# Patient Record
Sex: MALE | Race: ASIAN | NOT HISPANIC OR LATINO | Employment: OTHER | URBAN - METROPOLITAN AREA
[De-identification: names, ages, dates, MRNs, and addresses within clinical notes are randomized per-mention and may not be internally consistent; named-entity substitution may affect disease eponyms.]

---

## 2017-03-06 DIAGNOSIS — I10 ESSENTIAL HYPERTENSION: ICD-10-CM

## 2017-03-07 RX ORDER — LOSARTAN POTASSIUM 100 MG/1
TABLET ORAL
Qty: 90 TAB | Refills: 3 | Status: SHIPPED | OUTPATIENT
Start: 2017-03-07 | End: 2018-03-01 | Stop reason: SDUPTHER

## 2017-03-07 NOTE — TELEPHONE ENCOUNTER
Was the patient seen in the last year in this department? Yes     Does patient have an active prescription for medications requested? Yes     Received Request Via: Pharmacy    Last office visit: 06/22/2016  Last labs: 06/23/2016

## 2017-03-08 ENCOUNTER — HOSPITAL ENCOUNTER (OUTPATIENT)
Dept: LAB | Facility: MEDICAL CENTER | Age: 72
End: 2017-03-08
Attending: INTERNAL MEDICINE
Payer: MEDICARE

## 2017-03-08 DIAGNOSIS — E78.2 MIXED HYPERLIPIDEMIA: ICD-10-CM

## 2017-03-08 DIAGNOSIS — Z85.46 HISTORY OF PROSTATE CANCER: ICD-10-CM

## 2017-03-08 DIAGNOSIS — I10 ESSENTIAL HYPERTENSION: ICD-10-CM

## 2017-03-08 LAB
ALBUMIN SERPL BCP-MCNC: 4.7 G/DL (ref 3.2–4.9)
ALBUMIN/GLOB SERPL: 1.4 G/DL
ALP SERPL-CCNC: 46 U/L (ref 30–99)
ALT SERPL-CCNC: 27 U/L (ref 2–50)
ANION GAP SERPL CALC-SCNC: 6 MMOL/L (ref 0–11.9)
AST SERPL-CCNC: 22 U/L (ref 12–45)
BASOPHILS # BLD AUTO: 0.06 K/UL (ref 0–0.12)
BASOPHILS NFR BLD AUTO: 1.2 % (ref 0–1.8)
BILIRUB SERPL-MCNC: 0.8 MG/DL (ref 0.1–1.5)
BUN SERPL-MCNC: 17 MG/DL (ref 8–22)
CALCIUM SERPL-MCNC: 10.3 MG/DL (ref 8.5–10.5)
CHLORIDE SERPL-SCNC: 103 MMOL/L (ref 96–112)
CHOLEST SERPL-MCNC: 158 MG/DL (ref 100–199)
CO2 SERPL-SCNC: 29 MMOL/L (ref 20–33)
CREAT SERPL-MCNC: 1.04 MG/DL (ref 0.5–1.4)
EOSINOPHIL # BLD: 0.04 K/UL (ref 0–0.51)
EOSINOPHIL NFR BLD AUTO: 0.8 % (ref 0–6.9)
ERYTHROCYTE [DISTWIDTH] IN BLOOD BY AUTOMATED COUNT: 43.1 FL (ref 35.9–50)
GLOBULIN SER CALC-MCNC: 3.4 G/DL (ref 1.9–3.5)
GLUCOSE SERPL-MCNC: 94 MG/DL (ref 65–99)
HCT VFR BLD AUTO: 51.6 % (ref 42–52)
HDLC SERPL-MCNC: 58 MG/DL
HGB BLD-MCNC: 17.4 G/DL (ref 14–18)
IMM GRANULOCYTES # BLD AUTO: 0.01 K/UL (ref 0–0.11)
IMM GRANULOCYTES NFR BLD AUTO: 0.2 % (ref 0–0.9)
LDLC SERPL CALC-MCNC: 81 MG/DL
LYMPHOCYTES # BLD: 1.59 K/UL (ref 1–4.8)
LYMPHOCYTES NFR BLD AUTO: 31.7 % (ref 22–41)
MCH RBC QN AUTO: 33.3 PG (ref 27–33)
MCHC RBC AUTO-ENTMCNC: 33.7 G/DL (ref 33.7–35.3)
MCV RBC AUTO: 98.7 FL (ref 81.4–97.8)
MONOCYTES # BLD: 0.22 K/UL (ref 0–0.85)
MONOCYTES NFR BLD AUTO: 4.4 % (ref 0–13.4)
NEUTROPHILS # BLD: 3.1 K/UL (ref 1.82–7.42)
NEUTROPHILS NFR BLD AUTO: 61.7 % (ref 44–72)
NRBC # BLD AUTO: 0 K/UL
NRBC BLD-RTO: 0 /100 WBC
PLATELET # BLD AUTO: 284 K/UL (ref 164–446)
PMV BLD AUTO: 10.9 FL (ref 9–12.9)
POTASSIUM SERPL-SCNC: 4.2 MMOL/L (ref 3.6–5.5)
PROT SERPL-MCNC: 8.1 G/DL (ref 6–8.2)
PSA SERPL DL<=0.01 NG/ML-MCNC: 0.02 NG/ML (ref 0–4)
RBC # BLD AUTO: 5.23 M/UL (ref 4.7–6.1)
SODIUM SERPL-SCNC: 138 MMOL/L (ref 135–145)
TRIGL SERPL-MCNC: 97 MG/DL (ref 0–149)
WBC # BLD AUTO: 5 K/UL (ref 4.8–10.8)

## 2017-03-08 PROCEDURE — 84153 ASSAY OF PSA TOTAL: CPT | Mod: GA

## 2017-03-08 PROCEDURE — 80061 LIPID PANEL: CPT

## 2017-03-08 PROCEDURE — 80053 COMPREHEN METABOLIC PANEL: CPT

## 2017-03-08 PROCEDURE — 36415 COLL VENOUS BLD VENIPUNCTURE: CPT

## 2017-03-08 PROCEDURE — 85025 COMPLETE CBC W/AUTO DIFF WBC: CPT

## 2017-03-09 NOTE — PROGRESS NOTES
Quick Note:    I will discuss the result in upcoming appointment.     Libra Washington MD    ______

## 2017-03-13 ENCOUNTER — OFFICE VISIT (OUTPATIENT)
Dept: MEDICAL GROUP | Age: 72
End: 2017-03-13
Payer: MEDICARE

## 2017-03-13 VITALS
WEIGHT: 192 LBS | DIASTOLIC BLOOD PRESSURE: 78 MMHG | HEART RATE: 72 BPM | SYSTOLIC BLOOD PRESSURE: 130 MMHG | OXYGEN SATURATION: 97 % | TEMPERATURE: 97.1 F | BODY MASS INDEX: 30.13 KG/M2 | HEIGHT: 67 IN

## 2017-03-13 DIAGNOSIS — I65.21 STENOSIS OF RIGHT CAROTID ARTERY: ICD-10-CM

## 2017-03-13 DIAGNOSIS — E78.2 MIXED HYPERLIPIDEMIA: ICD-10-CM

## 2017-03-13 DIAGNOSIS — I10 ESSENTIAL HYPERTENSION: ICD-10-CM

## 2017-03-13 DIAGNOSIS — Z23 NEED FOR VACCINATION: ICD-10-CM

## 2017-03-13 PROCEDURE — 90715 TDAP VACCINE 7 YRS/> IM: CPT | Performed by: INTERNAL MEDICINE

## 2017-03-13 PROCEDURE — G8598 ASA/ANTIPLAT THER USED: HCPCS | Performed by: INTERNAL MEDICINE

## 2017-03-13 PROCEDURE — 4040F PNEUMOC VAC/ADMIN/RCVD: CPT | Performed by: INTERNAL MEDICINE

## 2017-03-13 PROCEDURE — G8420 CALC BMI NORM PARAMETERS: HCPCS | Performed by: INTERNAL MEDICINE

## 2017-03-13 PROCEDURE — 1101F PT FALLS ASSESS-DOCD LE1/YR: CPT | Performed by: INTERNAL MEDICINE

## 2017-03-13 PROCEDURE — 99214 OFFICE O/P EST MOD 30 MIN: CPT | Mod: 25 | Performed by: INTERNAL MEDICINE

## 2017-03-13 PROCEDURE — G8482 FLU IMMUNIZE ORDER/ADMIN: HCPCS | Performed by: INTERNAL MEDICINE

## 2017-03-13 PROCEDURE — G8432 DEP SCR NOT DOC, RNG: HCPCS | Performed by: INTERNAL MEDICINE

## 2017-03-13 PROCEDURE — 90471 IMMUNIZATION ADMIN: CPT | Performed by: INTERNAL MEDICINE

## 2017-03-13 PROCEDURE — 1036F TOBACCO NON-USER: CPT | Performed by: INTERNAL MEDICINE

## 2017-03-13 PROCEDURE — 3017F COLORECTAL CA SCREEN DOC REV: CPT | Mod: 1P | Performed by: INTERNAL MEDICINE

## 2017-03-13 RX ORDER — ATORVASTATIN CALCIUM 10 MG/1
10 TABLET, FILM COATED ORAL DAILY
Qty: 90 TAB | Refills: 3 | Status: SHIPPED | OUTPATIENT
Start: 2017-03-13 | End: 2017-09-18 | Stop reason: SDUPTHER

## 2017-03-13 ASSESSMENT — PATIENT HEALTH QUESTIONNAIRE - PHQ9: CLINICAL INTERPRETATION OF PHQ2 SCORE: 0

## 2017-03-13 NOTE — MR AVS SNAPSHOT
"        Joshua Ed Jainel   3/13/2017 9:00 AM   Office Visit   MRN: [id number]    Department:  [address] [place] [address]   Dept Phone:  [phone]    Description:  Male : 1945   Provider:  Libra Washington M.D.           Reason for Visit     Hypertension     Results Blood work done on 2017    Hyperlipidemia           Allergies as of 3/13/2017     No Known Allergies      You were diagnosed with     Essential hypertension   [5596470]       Mixed hyperlipidemia   [272.2.ICD-9-CM]       Need for vaccination   [359343]         Vital Signs     Blood Pressure Pulse Temperature Height Weight Body Mass Index    130/78 mmHg 72 36.2 °C (97.1 °F) 1.708 m (5' 7.25\") 87.091 kg (192 lb) 29.85 kg/m2    Oxygen Saturation Smoking Status                97% Never Smoker           Basic Information     Date Of Birth Sex Race Ethnicity Preferred Language    1945 Male  Non- English      Your appointments     Sep 18, 2017  9:00 AM   Established Patient with Libra Washington M.D.   [address] [address]   [phone]           You will be receiving a confirmation call a few days before your appointment from our automated call confirmation system.              Problem List              ICD-10-CM Priority Class Noted - Resolved    H/O prostatectomy Z90.79   9/3/2013 - Present    History of prostate cancer Z85.46   9/3/2013 - Present    Essential hypertension I10 Low  2014 - Present    Mixed hyperlipidemia E78.2 Low  2014 - Present    CAD (coronary artery disease) I25.10 High  2014 - Present    Stenosis of right carotid artery I65.21   2014 - Present    S/P CABG (coronary artery bypass graft) Z95.1 High  12/15/2014 - Present    Aortic stenosis I35.0 Medium  2/10/2015 - Present    Other and combined forms of senile cataract H25.89   2015 - Present    Postoperative atrial fibrillation (CMS-HCC) I97.89, I48.91 Medium  " 6/30/2015 - Present    Nocturnal enuresis N39.44   10/28/2015 - Present    Arthritis of right knee M19.90   9/16/2016 - Present    Bilateral low back pain without sciatica M54.5   9/16/2016 - Present      Health Maintenance        Date Due Completion Dates    IMM DTaP/Tdap/Td Vaccine (1 - Tdap) 5/26/1966 ---    IMM PNEUMOCOCCAL 65+ (ADULT) LOW/MEDIUM RISK SERIES (2 of 2 - PCV13) 12/1/2015 12/1/2014    COLONOSCOPY 9/3/2016 9/3/2006 (Prv Comp)    Override on 9/3/2006: Previously completed            Current Immunizations     13-VALENT PCV PREVNAR  Incomplete    Influenza TIV (IM) 11/20/2014    Influenza Vaccine Adult HD 10/4/2016  9:09 AM, 10/28/2015    Pneumococcal polysaccharide vaccine (PPSV-23) 12/1/2014    SHINGLES VACCINE 11/25/2015  1:26 PM    Tdap Vaccine  Incomplete      Below and/or attached are the medications your provider expects you to take. Review all of your home medications and newly ordered medications with your provider and/or pharmacist. Follow medication instructions as directed by your provider and/or pharmacist. Please keep your medication list with you and share with your provider. Update the information when medications are discontinued, doses are changed, or new medications (including over-the-counter products) are added; and carry medication information at all times in the event of emergency situations     Allergies:  No Known Allergies          Medications  Valid as of: March 13, 2017 - 10:26 AM    Generic Name Brand Name Tablet Size Instructions for use    Aspirin (Tablet Delayed Response) ECOTRIN 81 MG Take 81 mg by mouth every day.        Atorvastatin Calcium (Tab) LIPITOR 10 MG Take 1 Tab by mouth every day.        Calcium (Tab) Calcium 600 MG Take  by mouth.        Clopidogrel Bisulfate (Tab) PLAVIX 75 MG Take 1 Tab by mouth every day.        Losartan Potassium (Tab) COZAAR 100 MG TAKE 1 TABLET BY MOUTH EVERY DAY        Metoprolol Tartrate (Tab) LOPRESSOR 25 MG Take 1 Tab by mouth 2  Times a Day.        Omega-3 Fatty Acids (Cap) fish oil 1000 MG Take 1,000 mg by mouth 3 times a day, with meals.        .                 Medicines prescribed today were sent to:     [facility] #[id number] - [place] - [address]    [address] [place] [address]    Phone: [phone] Fax: [phone]    Open 24 Hours?: No      Medication refill instructions:       If your prescription bottle indicates you have medication refills left, it is not necessary to call your provider’s office. Please contact your pharmacy and they will refill your medication.    If your prescription bottle indicates you do not have any refills left, you may request refills at any time through one of the following ways: The online [employer] system (except Urgent Care), by calling your provider’s office, or by asking your pharmacy to contact your provider’s office with a refill request. Medication refills are processed only during regular business hours and may not be available until the next business day. Your provider may request additional information or to have a follow-up visit with you prior to refilling your medication.   *Please Note: Medication refills are assigned a new Rx number when refilled electronically. Your pharmacy may indicate that no refills were authorized even though a new prescription for the same medication is available at the pharmacy. Please request the medicine by name with the pharmacy before contacting your provider for a refill.        Your To Do List     Future Labs/Procedures Complete By Expires    COMP METABOLIC PANEL  As directed 3/14/2018    LIPID PROFILE  As directed 3/14/2018         Oris4 Access Code: Activation code not generated  Current Oris4 Status: Active

## 2017-03-13 NOTE — Clinical Note
[facility]  Libra Washington M.D.  [address]  [place] [address]  Fax: [phone]   Authorization for Release/Disclosure of   Protected Health Information   Name: JOSHUA BECERRA : 1945 SSN: [id number]   Address: [facility] [address]   [place] [address] Phone:    [phone] (home)    I authorize the entity listed below to release/disclose the PHI below to:   [facility]/Libra Washington M.D. and Libra Washington M.D.   Provider or Entity Name:  [facility]   Address   City, State, [facility]   Phone:      Fax:  [phone]     Reason for request: continuity of care   Information to be released:    [ x ] LAST COLONOSCOPY,  including any PATH REPORT and follow-up  [  ] LAST FIT/COLOGUARD RESULT [  ] LAST DEXA  [  ] LAST MAMMOGRAM  [  ] LAST PAP  [  ] LAST LABS [  ] RETINA EXAM REPORT  [  ] IMMUNIZATION RECORDS  [  ] Release all info      [  ] Check here and initial the line next to each item to release ALL health information INCLUDING  _____ Care and treatment for drug and / or alcohol abuse  _____ HIV testing, infection status, or AIDS  _____ Genetic Testing    DATES OF SERVICE OR TIME PERIOD TO BE DISCLOSED: _____________  I understand and acknowledge that:  * This Authorization may be revoked at any time by you in writing, except if your health information has already been used or disclosed.  * Your health information that will be used or disclosed as a result of you signing this authorization could be re-disclosed by the recipient. If this occurs, your re-disclosed health information may no longer be protected by State or Federal laws.  * You may refuse to sign this Authorization. Your refusal will not affect your ability to obtain treatment.  * This Authorization becomes effective upon signing and will  on (date) __________.      If no date is indicated, this Authorization will  one (1) year from the signature date.    Name: Joshua Becerra    Signature:   Date:      3/13/2017       PLEASE FAX REQUESTED RECORDS BACK TO: [phone]

## 2017-03-13 NOTE — ASSESSMENT & PLAN NOTE
His blood pressure is well controlled with current regimen, metoprolol 25 mg twice a day, losartan 100 mg daily. He tolerates medications without side effect. His recent CMP on 3/8/17 were within normal. He has normal CBC except increase MCV, but no anemia, has normal white blood cell and differential count.

## 2017-03-13 NOTE — ASSESSMENT & PLAN NOTE
Patient is taking Plavix 75 mg daily and Lipitor 10 mg daily. He also takes aspirin 81 mg daily. I discussed with him the risks and benefits of double antiplatelet and blood thinner. He denies side effects from taking Plavix, aspirin and Lipitor currently. He is asymptomatic. He will follow with cardiologist in June 2017. Patient reported that he is going to discuss with his cardiologist to stop baby aspirin and continue Plavix.

## 2017-03-13 NOTE — PROGRESS NOTES
Subjective:   Joshua Becerra is a 71 y.o. male here today for evaluation and management of:      Stenosis of right carotid artery  Patient is taking Plavix 75 mg daily and Lipitor 10 mg daily. He also takes aspirin 81 mg daily. I discussed with him the risks and benefits of double antiplatelet and blood thinner. He denies side effects from taking Plavix, aspirin and Lipitor currently. He is asymptomatic. He will follow with cardiologist in June 2017. Patient reported that he is going to discuss with his cardiologist to stop baby aspirin and continue Plavix.    Essential hypertension  His blood pressure is well controlled with current regimen, metoprolol 25 mg twice a day, losartan 100 mg daily. He tolerates medications without side effect. His recent CMP on 3/8/17 were within normal. He has normal CBC except increase MCV, but no anemia, has normal white blood cell and differential count.    Mixed hyperlipidemia  Patient is taking Lipitor 10 mg every afternoon. His Lipitor was decreased from 20 mg to 10 mg by cardiologist on 6/30/16. His lipid panel is at goal. Liver enzymes on 3/8/17 were within normal.  Results for JOSHUA BECERRA (MRN [id number]) as of 3/13/2017 11:01   Ref. Range 3/8/2017 08:53   Cholesterol,Tot Latest Ref Range: 100-199 mg/dL 158   Triglycerides Latest Ref Range: 0-149 mg/dL 97   HDL Latest Ref Range: >=40 mg/dL 58   LDL Latest Ref Range: <100 mg/dL 81              Current medicines (including changes today)  Current Outpatient Prescriptions   Medication Sig Dispense Refill   • metoprolol (LOPRESSOR) 25 MG Tab Take 1 Tab by mouth 2 Times a Day. 180 Tab 3   • atorvastatin (LIPITOR) 10 MG Tab Take 1 Tab by mouth every day. 90 Tab 3   • losartan (COZAAR) 100 MG Tab TAKE 1 TABLET BY MOUTH EVERY DAY 90 Tab 3   • Calcium 600 MG Tab Take  by mouth.     • aspirin EC (ECOTRIN) 81 MG Tablet Delayed Response Take 81 mg by mouth every day.     • Omega-3 Fatty Acids (FISH OIL) 1000 MG Cap  "capsule Take 1,000 mg by mouth 3 times a day, with meals.     • clopidogrel (PLAVIX) 75 MG Tab Take 1 Tab by mouth every day. 30 Tab 11     No current facility-administered medications for this visit.     He  has a past medical history of Hyperlipidemia; Unspecified urinary incontinence; Cancer (CMS-HCC); Unspecified cataract; High cholesterol; Hypertension; and CAD (coronary artery disease).    ROS   No chest pain, no shortness of breath, no abdominal pain       Objective:     Blood pressure 130/78, pulse 72, temperature 36.2 °C (97.1 °F), height 1.708 m (5' 7.25\"), weight 87.091 kg (192 lb), SpO2 97 %. Body mass index is 29.85 kg/(m^2).   Physical Exam:  General: Alert, oriented and no acute distress.  Eye contact is good, speech goal directed, affect calm  HEENT: conjunctiva non-injected, sclera non-icteric.  Oral mucous membranes pink and moist with no lesions.  Pinna normal.   Lungs: Normal respiratory effort, clear to auscultation bilaterally with good excursion.  CV: regular rate and rhythm. No murmurs.  Abdomen: soft, non distended, nontender, Bowel sound normal.  Ext: no edema, color normal, vascularity normal, temperature normal        Assessment and Plan:   The following treatment plan was discussed     1. Essential hypertension  - Well-controlled. Continue current regimens. Recheck lab 1-2 weeks before next follow up visit.  - metoprolol (LOPRESSOR) 25 MG Tab; Take 1 Tab by mouth 2 Times a Day.  Dispense: 180 Tab; Refill: 3  - COMP METABOLIC PANEL; Future    2. Mixed hyperlipidemia  - Well-controlled. Continue current regimens. Recheck lab 1-2 weeks before next follow up visit.  - Advised to eat low fat, low carbohydrate and high fiber diet as well as do cardio physical exercise regularly.   - COMP METABOLIC PANEL; Future  - LIPID PROFILE; Future  - atorvastatin (LIPITOR) 10 MG Tab; Take 1 Tab by mouth every day.  Dispense: 90 Tab; Refill: 3    3. Stenosis of right carotid artery  - Stable. Asymptomatic. " Continue Plavix 75 mg daily. Reviewed the risks and benefits of double regimen of Plavix and aspirin. Patient will discuss with cardiologist in upcoming follow-up in June 2017.  - Discussed to control blood pressure, cholesterol with diet, exercise and medication.    4. Need for vaccination  - Tdap vaccine was given today after reviewing risks and benefits as well as side effects of vaccine.  - TDAP VACCINE =>8YO IM      Health Maintenance: Patient reported that he already has GI consultant and he needs to call for follow-up for colonoscopy.    Followup: Return in about 6 months (around 9/13/2017), or if symptoms worsen or fail to improve, for hypertension, hyperlipidemia, coronary artery disease, right carotid artery stenosis, lab review.      Please note that this dictation was created using voice recognition software. I have made every reasonable attempt to correct obvious errors, but I expect that there may have unintended errors in text, spelling, punctuation, or grammar that I did not discover.

## 2017-03-13 NOTE — ASSESSMENT & PLAN NOTE
Patient is taking Lipitor 10 mg every afternoon. His Lipitor was decreased from 20 mg to 10 mg by cardiologist on 6/30/16. His lipid panel is at goal. Liver enzymes on 3/8/17 were within normal.  Results for ANNE JESENIA KIRSTEN (MRN [id number]) as of 3/13/2017 11:01   Ref. Range 3/8/2017 08:53   Cholesterol,Tot Latest Ref Range: 100-199 mg/dL 158   Triglycerides Latest Ref Range: 0-149 mg/dL 97   HDL Latest Ref Range: >=40 mg/dL 58   LDL Latest Ref Range: <100 mg/dL 81

## 2017-05-06 ENCOUNTER — PATIENT OUTREACH (OUTPATIENT)
Dept: HEALTH INFORMATION MANAGEMENT | Facility: OTHER | Age: 72
End: 2017-05-06

## 2017-05-06 NOTE — PROGRESS NOTES
Outcome: Left Message    WebIZ Checked & Epic Updated:  yes    [facility] Verified: yes    Attempt # 1

## 2017-05-09 ENCOUNTER — TELEPHONE (OUTPATIENT)
Dept: CARDIOLOGY | Facility: MEDICAL CENTER | Age: 72
End: 2017-05-09

## 2017-05-09 NOTE — TELEPHONE ENCOUNTER
CHERY/Kalpana     Patient has an appt on 6/6 with Dr Garcia and wants to get an order for an Echo. He can be reached at [phone] or [phone].            Pt notified that he had an order in Epic for ECHO. Number given for imaging for pt to call and schedule appointment.

## 2017-05-17 NOTE — PROGRESS NOTES
Outcome: Left Message    WebIZ Checked & Epic Updated:  yes    [facility] Verified: yes    Attempt # 2

## 2017-05-18 ENCOUNTER — HOSPITAL ENCOUNTER (OUTPATIENT)
Dept: CARDIOLOGY | Facility: MEDICAL CENTER | Age: 72
End: 2017-05-18
Attending: INTERNAL MEDICINE
Payer: MEDICARE

## 2017-05-18 DIAGNOSIS — I25.10 CORONARY ARTERY DISEASE INVOLVING NATIVE CORONARY ARTERY OF NATIVE HEART WITHOUT ANGINA PECTORIS: ICD-10-CM

## 2017-05-18 DIAGNOSIS — Z95.1 S/P CABG (CORONARY ARTERY BYPASS GRAFT): ICD-10-CM

## 2017-05-18 PROCEDURE — 93306 TTE W/DOPPLER COMPLETE: CPT

## 2017-05-18 PROCEDURE — 93306 TTE W/DOPPLER COMPLETE: CPT | Mod: 26 | Performed by: INTERNAL MEDICINE

## 2017-05-19 LAB
LV EJECT FRACT  99904: 60
LV EJECT FRACT MOD 2C 99903: 51.93

## 2017-05-22 NOTE — PROGRESS NOTES
Outcome: Left Message    WebIZ Checked & Epic Updated:  yes    [facility] Verified: yes    Attempt # 3

## 2017-05-23 NOTE — PROGRESS NOTES
Outcome: Left Message    WebIZ Checked & Epic Updated:  yes    [facility] Verified: yes    Attempt # 4

## 2017-06-06 ENCOUNTER — OFFICE VISIT (OUTPATIENT)
Dept: CARDIOLOGY | Facility: MEDICAL CENTER | Age: 72
End: 2017-06-06
Payer: MEDICARE

## 2017-06-06 VITALS
HEIGHT: 67 IN | DIASTOLIC BLOOD PRESSURE: 74 MMHG | OXYGEN SATURATION: 94 % | WEIGHT: 194 LBS | SYSTOLIC BLOOD PRESSURE: 130 MMHG | BODY MASS INDEX: 30.45 KG/M2 | HEART RATE: 70 BPM

## 2017-06-06 DIAGNOSIS — I25.10 CORONARY ARTERY DISEASE INVOLVING NATIVE CORONARY ARTERY OF NATIVE HEART WITHOUT ANGINA PECTORIS: ICD-10-CM

## 2017-06-06 DIAGNOSIS — I48.91 POSTOPERATIVE ATRIAL FIBRILLATION (HCC): ICD-10-CM

## 2017-06-06 DIAGNOSIS — E78.2 MIXED HYPERLIPIDEMIA: ICD-10-CM

## 2017-06-06 DIAGNOSIS — I10 ESSENTIAL HYPERTENSION: ICD-10-CM

## 2017-06-06 DIAGNOSIS — I65.21 STENOSIS OF RIGHT CAROTID ARTERY: ICD-10-CM

## 2017-06-06 DIAGNOSIS — I35.0 AORTIC VALVE STENOSIS, UNSPECIFIED ETIOLOGY: ICD-10-CM

## 2017-06-06 DIAGNOSIS — Z95.1 S/P CABG (CORONARY ARTERY BYPASS GRAFT): ICD-10-CM

## 2017-06-06 DIAGNOSIS — I97.89 POSTOPERATIVE ATRIAL FIBRILLATION (HCC): ICD-10-CM

## 2017-06-06 PROCEDURE — G8432 DEP SCR NOT DOC, RNG: HCPCS | Performed by: INTERNAL MEDICINE

## 2017-06-06 PROCEDURE — 99214 OFFICE O/P EST MOD 30 MIN: CPT | Performed by: INTERNAL MEDICINE

## 2017-06-06 PROCEDURE — 4040F PNEUMOC VAC/ADMIN/RCVD: CPT | Performed by: INTERNAL MEDICINE

## 2017-06-06 PROCEDURE — G8598 ASA/ANTIPLAT THER USED: HCPCS | Performed by: INTERNAL MEDICINE

## 2017-06-06 PROCEDURE — 1101F PT FALLS ASSESS-DOCD LE1/YR: CPT | Performed by: INTERNAL MEDICINE

## 2017-06-06 PROCEDURE — G8419 CALC BMI OUT NRM PARAM NOF/U: HCPCS | Performed by: INTERNAL MEDICINE

## 2017-06-06 PROCEDURE — 1036F TOBACCO NON-USER: CPT | Performed by: INTERNAL MEDICINE

## 2017-06-06 ASSESSMENT — ENCOUNTER SYMPTOMS
DIZZINESS: 0
PND: 0
BLURRED VISION: 0
ABDOMINAL PAIN: 0
LOSS OF CONSCIOUSNESS: 0
PALPITATIONS: 0
WEIGHT LOSS: 1
CHILLS: 0
ORTHOPNEA: 0
MYALGIAS: 0
INSOMNIA: 0
SHORTNESS OF BREATH: 0
FEVER: 0

## 2017-06-06 NOTE — Clinical Note
[facility]   [address] [place] [address]  SINDY Murphy [address]  Phone: [phone]  Fax: [phone]              Joshua Becerra  1945    Encounter Date: 6/6/2017    Nahun Garcia M.D.          PROGRESS NOTE:  Subjective:   Joshua Becerra is a 71 y.o. male who presents today for annual follow up of coronary artery disease with prior coronary artery bypass graft surgery.    Since the patient's last visit on 06/30/16, he has been doing well clinically. He denies chest pain, shortness of breath, palpitations, nausea/vomiting or diaphoresis. He keeps active walking on his treadmill. He has reduced his red meat intake.    Past Medical History   Diagnosis Date   • Hyperlipidemia    • Unspecified urinary incontinence    • Cancer (CMS-HCC)      Prostate CA- Prostatectomy done   • Unspecified cataract    • High cholesterol    • Hypertension    • CAD (coronary artery disease)      Past Surgical History   Procedure Laterality Date   • Prostatectomy, radical retro  12/17/2008   • Multiple coronary artery bypass endo vein harvest  12/15/2014     Performed by Gustavo Richardson M.D. at SURGERY [facility]   • Cataract phaco with iol Right 6/4/2015     Procedure: CATARACT PHACO WITH IOL;  Surgeon: Matty Augustin M.D.;  Location: SURGERY SURGICAL [facility];  Service:    • Cataract phaco with iol Left 7/2/2015     Procedure: CATARACT PHACO WITH IOL;  Surgeon: Matty Augustin M.D.;  Location: SURGERY SURGICAL [facility];  Service:      Family History   Problem Relation Age of Onset   • Cancer Sister      colon   • Cancer Brother      prostate   • Heart Disease Mother    • Arthritis Mother    • Hypertension Mother    • Hyperlipidemia Mother    • Stroke Mother    • Heart Disease Father    • Hypertension Father    • Hyperlipidemia Father    • Stroke Father    • Diabetes Neg Hx    • Arthritis Brother    • Hyperlipidemia Brother      History   Smoking status   • Never Smoker     "  Smokeless tobacco   • Never Used     No Known Allergies    Medications reviewed.    Outpatient Encounter Prescriptions as of 6/6/2017   Medication Sig Dispense Refill   • metoprolol (LOPRESSOR) 25 MG Tab Take 1 Tab by mouth 2 Times a Day. 180 Tab 3   • atorvastatin (LIPITOR) 10 MG Tab Take 1 Tab by mouth every day. 90 Tab 3   • losartan (COZAAR) 100 MG Tab TAKE 1 TABLET BY MOUTH EVERY DAY 90 Tab 3   • Calcium 600 MG Tab Take  by mouth.     • aspirin EC (ECOTRIN) 81 MG Tablet Delayed Response Take 81 mg by mouth every day.     • Omega-3 Fatty Acids (FISH OIL) 1000 MG Cap capsule Take 1,000 mg by mouth 3 times a day, with meals.     • clopidogrel (PLAVIX) 75 MG Tab Take 1 Tab by mouth every day. 30 Tab 11     No facility-administered encounter medications on file as of 6/6/2017.     Review of Systems   Constitutional: Positive for weight loss. Negative for fever and chills.   HENT: Negative for congestion.    Eyes: Negative for blurred vision.   Respiratory: Negative for shortness of breath.    Cardiovascular: Negative for chest pain, palpitations, orthopnea, leg swelling and PND.   Gastrointestinal: Negative for abdominal pain.   Genitourinary: Negative for dysuria.   Musculoskeletal: Negative for myalgias and joint pain.   Skin: Negative for rash.   Neurological: Negative for dizziness and loss of consciousness.   Psychiatric/Behavioral: The patient does not have insomnia.         Objective:   /74 mmHg  Pulse 70  Ht 1.702 m (5' 7\")  Wt 87.998 kg (194 lb)  BMI 30.38 kg/m2  SpO2 94%    Physical Exam   Constitutional: He is oriented to person, place, and time. He appears well-developed and well-nourished.   HENT:   Head: Normocephalic and atraumatic.   Eyes: Conjunctivae are normal. Pupils are equal, round, and reactive to light.   Neck: Normal range of motion. Neck supple.   Cardiovascular: Normal rate and regular rhythm.    Murmur heard.  Pulmonary/Chest: Effort normal and breath sounds normal. "   Abdominal: Soft. Bowel sounds are normal.   Musculoskeletal: Normal range of motion. He exhibits no edema.   Neurological: He is alert and oriented to person, place, and time.   Skin: Skin is warm and dry.   Psychiatric: He has a normal mood and affect.     CARDIAC STUDIES/PROCEDURES:    CARDIAC CATHETERIZATION CONCLUSIONS (12/13/14)  1. Two-vessel coronary artery disease with mid left anterior descending   artery, ostial diagonal branch and proximal circumflex artery stenosis.   2. Normal left ventricular systolic function with ejection fraction of 55%.   3. Mildly elevated left ventricular end diastolic pressure.   4. Mild aortic stenosis.    CAROTID ULTRASOUND (12/13/14)  RIGHT:  Very mild smooth plaque of the common carotids & bifurcation with minimal   plaque in the proximal internal carotid artery. The ICA waveforms are   resistive with low peak systolic & end diastolic velocities which may   suggest distal obstruction or occlusion.  LEFT:  Very mild smooth plaque of the common carotids & bifurcation extending into   the internal carotid. Velocities are consistent with < 50% stenosis of the   internal carotid artery.   Bilateral subclavian and vertebral artery waveforms are antegrade and   waveforms are normal in character and velocity.     CT OF HEAD (12/18/14)  1. Occlusion or near occlusion of the intracranial right internal carotid artery with apparent tiny branch extending to the middle cerebral artery. The right middle cerebral artery appears to be supplied primarily by the anterior communicating artery.    CT OF NECK (12/18/14)  1. Diffuse narrowing of the right internal carotid artery beginning approximately 2 cm distal to its origin could be due to diffuse atherosclerotic disease or chronic dissection.  2. Occlusion or near occlusion of the intracranial portion of the right internal carotid artery.  3. No evidence of left internal carotid artery stenosis.    ECHOCARDIOGRAM CONCLUSIONS  (05/18/17)  Compared to the prior echo dated 7/9/15, the aortic valve still appears mildly stenotic.  1. Left ventricular ejection fraction is visually estimated to be 60%.   Grade II diastolic dysfunction.  2. There is mild aortic stenosis.  AV Peak Velocity 1.9 m/s                 AV Peak Gradient 14.6 mmHg               AV Mean Gradient 8 mmHg   3. Estimated right ventricular systolic pressure  is 30 mmHg.    ECHOCARDIOGRAM CONCLUSIONS (07/09/15)  Normal left ventricular systolic function.  Moderate concentric left ventricular hypertrophy.  Normal diastolic function - normal mitral inflow pattern and E/E' is normal.  Mitral annular calcification.  Mild mitral regurgitation.  Mild aortic stenosis.  Mild tricuspid regurgitation.    ECHOCARDIOGRAM CONCLUSIONS (12/12/14)  Technically good study.  Normal left ventricular systolic function.  Left ventricular ejection fraction is 60% to 65%.  Grade I diastolic dysfunction - mitral inflow E/A is <1.0.  Mild mitral regurgitation.  Mild aortic stenosis.  Mild tricuspid regurgitation.    EKG performed on (12/18/14) EKG shows atrial fibrillation.  EKG performed on (12/13/14) EKG shows normal sinus rhythm.    Laboratory results of (03/08/17) were reviewed. Cholesterol profile of 158/97/58/81 noted.  Laboratory results of (06/03/16) Cholesterol profile of 106/102/45/41 noted.  Laboratory results of (12/13/14) Cholesterol profile of 139/98/46/73 noted.    Assessment:     Patient Active Problem List    Diagnosis Date Noted   • S/P CABG (coronary artery bypass graft) 12/15/2014     Priority: High   • CAD (coronary artery disease) 12/14/2014     Priority: High   • Postoperative atrial fibrillation 06/30/2015     Priority: Medium   • Aortic stenosis 02/10/2015     Priority: Medium   • HTN (hypertension) 12/12/2014     Priority: Low   • HLD (hyperlipidemia) 12/01/2014     Priority: Low   • Carotid artery stenosis 12/14/2014     Medical Decision Making:  Today's Assessment / Status /  Plan:     1. Coronary artery disease status post coronary bypass graft x 3 with left internal mammary artery graft to left anterior descending artery, saphenous vein graft to diagonal branch, saphenous vein graft to distal obtuse marginal branch by Dr. Richardson (12/15/14): He is clinically doing well.  2. Post operative atrial fibrillation: Sinus rhythm is maintained without evidence of atrial fibrillation episodes off of amiodarone.  3. Aortic stenosis: He is clinically doing well with mild aortic stenosis. We will repeat an echocardiogram in one year.  3. Hypertension: Blood pressure is well controlled.  4. Hyperlipidemia: He is doing well on statin therapy without myalgia symptoms. He will work on diet modification and exercise. We will repeat labs including fasting lipid profile in one year.  5. Carotid artery stenosis-near occlusion of ICA (managed by Dr. Alamo): Clinically stable on medical therapy and continue chronic Plavix use. He has not followed up with Dr. Alamo. We will repeat carotid ultrasound now and in one year.    We will follow up the patient in one year with carotid ultrasounds, echocardiogram and labs.    CC Libra Rao and Andrea Askew      No Recipients

## 2017-06-06 NOTE — PROGRESS NOTES
Subjective:   Joshua Becerra is a 71 y.o. male who presents today for annual follow up of coronary artery disease with prior coronary artery bypass graft surgery.    Since the patient's last visit on 06/30/16, he has been doing well clinically. He denies chest pain, shortness of breath, palpitations, nausea/vomiting or diaphoresis. He keeps active walking on his treadmill. He has reduced his red meat intake.    Past Medical History   Diagnosis Date   • Hyperlipidemia    • Unspecified urinary incontinence    • Cancer (CMS-HCC)      Prostate CA- Prostatectomy done   • Unspecified cataract    • High cholesterol    • Hypertension    • CAD (coronary artery disease)      Past Surgical History   Procedure Laterality Date   • Prostatectomy, radical retro  12/17/2008   • Multiple coronary artery bypass endo vein harvest  12/15/2014     Performed by Gustavo Richardson M.D. at [facility]   • Cataract phaco with iol Right 6/4/2015     Procedure: CATARACT PHACO WITH IOL;  Surgeon: Matty Augustin M.D.;  Location: SURGERY SURGICAL [facility];  Service:    • Cataract phaco with iol Left 7/2/2015     Procedure: CATARACT PHACO WITH IOL;  Surgeon: Matty Augustin M.D.;  Location: SURGERY SURGICAL [facility];  Service:      Family History   Problem Relation Age of Onset   • Cancer Sister      colon   • Cancer Brother      prostate   • Heart Disease Mother    • Arthritis Mother    • Hypertension Mother    • Hyperlipidemia Mother    • Stroke Mother    • Heart Disease Father    • Hypertension Father    • Hyperlipidemia Father    • Stroke Father    • Diabetes Neg Hx    • Arthritis Brother    • Hyperlipidemia Brother      History   Smoking status   • Never Smoker    Smokeless tobacco   • Never Used     No Known Allergies    Medications reviewed.    Outpatient Encounter Prescriptions as of 6/6/2017   Medication Sig Dispense Refill   • metoprolol (LOPRESSOR) 25 MG Tab Take 1 Tab by mouth 2 Times a Day. 180 Tab 3   • atorvastatin  "(LIPITOR) 10 MG Tab Take 1 Tab by mouth every day. 90 Tab 3   • losartan (COZAAR) 100 MG Tab TAKE 1 TABLET BY MOUTH EVERY DAY 90 Tab 3   • Calcium 600 MG Tab Take  by mouth.     • aspirin EC (ECOTRIN) 81 MG Tablet Delayed Response Take 81 mg by mouth every day.     • Omega-3 Fatty Acids (FISH OIL) 1000 MG Cap capsule Take 1,000 mg by mouth 3 times a day, with meals.     • clopidogrel (PLAVIX) 75 MG Tab Take 1 Tab by mouth every day. 30 Tab 11     No facility-administered encounter medications on file as of 6/6/2017.     Review of Systems   Constitutional: Positive for weight loss. Negative for fever and chills.   HENT: Negative for congestion.    Eyes: Negative for blurred vision.   Respiratory: Negative for shortness of breath.    Cardiovascular: Negative for chest pain, palpitations, orthopnea, leg swelling and PND.   Gastrointestinal: Negative for abdominal pain.   Genitourinary: Negative for dysuria.   Musculoskeletal: Negative for myalgias and joint pain.   Skin: Negative for rash.   Neurological: Negative for dizziness and loss of consciousness.   Psychiatric/Behavioral: The patient does not have insomnia.         Objective:   /74 mmHg  Pulse 70  Ht 1.702 m (5' 7\")  Wt 87.998 kg (194 lb)  BMI 30.38 kg/m2  SpO2 94%    Physical Exam   Constitutional: He is oriented to person, place, and time. He appears well-developed and well-nourished.   HENT:   Head: Normocephalic and atraumatic.   Eyes: Conjunctivae are normal. Pupils are equal, round, and reactive to light.   Neck: Normal range of motion. Neck supple.   Cardiovascular: Normal rate and regular rhythm.    Murmur heard.  Pulmonary/Chest: Effort normal and breath sounds normal.   Abdominal: Soft. Bowel sounds are normal.   Musculoskeletal: Normal range of motion. He exhibits no edema.   Neurological: He is alert and oriented to person, place, and time.   Skin: Skin is warm and dry.   Psychiatric: He has a normal mood and affect.     CARDIAC " STUDIES/PROCEDURES:    CARDIAC CATHETERIZATION CONCLUSIONS (12/13/14)  1. Two-vessel coronary artery disease with mid left anterior descending   artery, ostial diagonal branch and proximal circumflex artery stenosis.   2. Normal left ventricular systolic function with ejection fraction of 55%.   3. Mildly elevated left ventricular end diastolic pressure.   4. Mild aortic stenosis.    CAROTID ULTRASOUND (12/13/14)  RIGHT:  Very mild smooth plaque of the common carotids & bifurcation with minimal   plaque in the proximal internal carotid artery. The ICA waveforms are   resistive with low peak systolic & end diastolic velocities which may   suggest distal obstruction or occlusion.  LEFT:  Very mild smooth plaque of the common carotids & bifurcation extending into   the internal carotid. Velocities are consistent with < 50% stenosis of the   internal carotid artery.   Bilateral subclavian and vertebral artery waveforms are antegrade and   waveforms are normal in character and velocity.     CT OF HEAD (12/18/14)  1. Occlusion or near occlusion of the intracranial right internal carotid artery with apparent tiny branch extending to the middle cerebral artery. The right middle cerebral artery appears to be supplied primarily by the anterior communicating artery.    CT OF NECK (12/18/14)  1. Diffuse narrowing of the right internal carotid artery beginning approximately 2 cm distal to its origin could be due to diffuse atherosclerotic disease or chronic dissection.  2. Occlusion or near occlusion of the intracranial portion of the right internal carotid artery.  3. No evidence of left internal carotid artery stenosis.    ECHOCARDIOGRAM CONCLUSIONS (05/18/17)  Compared to the prior echo dated 7/9/15, the aortic valve still appears mildly stenotic.  1. Left ventricular ejection fraction is visually estimated to be 60%.   Grade II diastolic dysfunction.  2. There is mild aortic stenosis.  AV Peak Velocity 1.9 m/s                  AV Peak Gradient 14.6 mmHg               AV Mean Gradient 8 mmHg   3. Estimated right ventricular systolic pressure  is 30 mmHg.    ECHOCARDIOGRAM CONCLUSIONS (07/09/15)  Normal left ventricular systolic function.  Moderate concentric left ventricular hypertrophy.  Normal diastolic function - normal mitral inflow pattern and E/E' is normal.  Mitral annular calcification.  Mild mitral regurgitation.  Mild aortic stenosis.  Mild tricuspid regurgitation.    ECHOCARDIOGRAM CONCLUSIONS (12/12/14)  Technically good study.  Normal left ventricular systolic function.  Left ventricular ejection fraction is 60% to 65%.  Grade I diastolic dysfunction - mitral inflow E/A is <1.0.  Mild mitral regurgitation.  Mild aortic stenosis.  Mild tricuspid regurgitation.    EKG performed on (12/18/14) EKG shows atrial fibrillation.  EKG performed on (12/13/14) EKG shows normal sinus rhythm.    Laboratory results of (03/08/17) were reviewed. Cholesterol profile of 158/97/58/81 noted.  Laboratory results of (06/03/16) Cholesterol profile of 106/102/45/41 noted.  Laboratory results of (12/13/14) Cholesterol profile of 139/98/46/73 noted.    Assessment:     Patient Active Problem List    Diagnosis Date Noted   • S/P CABG (coronary artery bypass graft) 12/15/2014     Priority: High   • CAD (coronary artery disease) 12/14/2014     Priority: High   • Postoperative atrial fibrillation 06/30/2015     Priority: Medium   • Aortic stenosis 02/10/2015     Priority: Medium   • HTN (hypertension) 12/12/2014     Priority: Low   • HLD (hyperlipidemia) 12/01/2014     Priority: Low   • Carotid artery stenosis 12/14/2014     Medical Decision Making:  Today's Assessment / Status / Plan:     1. Coronary artery disease status post coronary bypass graft x 3 with left internal mammary artery graft to left anterior descending artery, saphenous vein graft to diagonal branch, saphenous vein graft to distal obtuse marginal branch by Dr. Richardson (12/15/14): He is  clinically doing well.  2. Post operative atrial fibrillation: Sinus rhythm is maintained without evidence of atrial fibrillation episodes off of amiodarone.  3. Aortic stenosis: He is clinically doing well with mild aortic stenosis. We will repeat an echocardiogram in one year.  3. Hypertension: Blood pressure is well controlled.  4. Hyperlipidemia: He is doing well on statin therapy without myalgia symptoms. He will work on diet modification and exercise. We will repeat labs including fasting lipid profile in one year.  5. Carotid artery stenosis-near occlusion of ICA (managed by Dr. Alamo): Clinically stable on medical therapy and continue chronic Plavix use. He has not followed up with Dr. Alamo. We will repeat carotid ultrasound now and in one year.    We will follow up the patient in one year with carotid ultrasounds, echocardiogram and labs.    CC Libra Rao and Andrea Askew

## 2017-06-06 NOTE — MR AVS SNAPSHOT
"Joshua Becerra   2017 4:00 PM   Office Visit   MRN: [id number]    Department:  [facility]   Dept Phone:  [phone]    Description:  Male : 1945   Provider:  Nahun Garcia M.D.           Reason for Visit     Follow-Up           Allergies as of 2017     No Known Allergies      You were diagnosed with     Coronary artery disease involving native coronary artery of native heart without angina pectoris   [7575602]       S/P CABG (coronary artery bypass graft)   [632975]       Postoperative atrial fibrillation (CMS-[facility])   [798264]       Aortic valve stenosis, unspecified etiology   [0878830]       Essential hypertension   [8062848]       Stenosis of right carotid artery   [034390]       Mixed hyperlipidemia   [272.2.ICD-9-CM]         Vital Signs     Blood Pressure Pulse Height Weight Body Mass Index Oxygen Saturation    130/74 mmHg 70 1.702 m (5' 7\") 87.998 kg (194 lb) 30.38 kg/m2 94%    Smoking Status                   Never Smoker            Basic Information     Date Of Birth Sex Race Ethnicity Preferred Language    1945 Male  Non- English      Your appointments     2017  8:15 AM   CAROTID DUPLEX with VASCULAR LAB [facility] EXAM 6   NON-INVASIVE LAB [facility] ([facility])    [address] [place] [id number] [phone]            Sep 18, 2017  9:00 AM   Established Patient with Libra Washington M.D.   [facility] [address] [place] [id number] [phone]           You will be receiving a confirmation call a few days before your appointment from our automated call confirmation system.            May 16, 2018  9:15 AM   ECHO with ECHO [facility] EXAM 12   ECHOCARDIOLOGY [facility] ([facility])    [address] [address] [phone]            May 16, 2018 10:15 AM   CAROTID DUPLEX with VASCULAR LAB [facility] EXAM 6   NON-INVASIVE LAB [facility] ([facility])    [address] [address]   "   [phone]              Problem List              ICD-10-CM Priority Class Noted - Resolved    H/O prostatectomy Z90.79   9/3/2013 - Present    History of prostate cancer Z85.46   9/3/2013 - Present    Essential hypertension I10 Low  12/1/2014 - Present    Mixed hyperlipidemia E78.2 Low  12/12/2014 - Present    CAD (coronary artery disease) I25.10 High  12/14/2014 - Present    Stenosis of right carotid artery I65.21   12/14/2014 - Present    S/P CABG (coronary artery bypass graft) Z95.1 High  12/15/2014 - Present    Aortic stenosis I35.0 Medium  2/10/2015 - Present    Other and combined forms of senile cataract H25.89   6/4/2015 - Present    Postoperative atrial fibrillation (CMS-HCC) I97.89, I48.91 Medium  6/30/2015 - Present    Nocturnal enuresis N39.44   10/28/2015 - Present    Arthritis of right knee M17.11   9/16/2016 - Present    Bilateral low back pain without sciatica M54.5   9/16/2016 - Present      Health Maintenance        Date Due Completion Dates    IMM PNEUMOCOCCAL 65+ (ADULT) LOW/MEDIUM RISK SERIES (2 of 2 - PCV13) 12/1/2015 12/1/2014    COLONOSCOPY 9/3/2016 9/3/2006 (Prv Comp)    Override on 9/3/2006: Previously completed    IMM DTaP/Tdap/Td Vaccine (2 - Td) 3/13/2027 3/13/2017            Current Immunizations     Influenza TIV (IM) 11/20/2014    Influenza Vaccine Adult HD 10/4/2016  9:09 AM, 10/28/2015    Pneumococcal polysaccharide vaccine (PPSV-23) 12/1/2014    SHINGLES VACCINE 11/25/2015  1:26 PM    Tdap Vaccine 3/13/2017 11:11 AM      Below and/or attached are the medications your provider expects you to take. Review all of your home medications and newly ordered medications with your provider and/or pharmacist. Follow medication instructions as directed by your provider and/or pharmacist. Please keep your medication list with you and share with your provider. Update the information when medications are discontinued, doses are changed, or new medications (including over-the-counter products)  are added; and carry medication information at all times in the event of emergency situations     Allergies:  No Known Allergies          Medications  Valid as of: June 06, 2017 -  4:08 PM    Generic Name Brand Name Tablet Size Instructions for use    Aspirin (Tablet Delayed Response) ECOTRIN 81 MG Take 81 mg by mouth every day.        Atorvastatin Calcium (Tab) LIPITOR 10 MG Take 1 Tab by mouth every day.        Calcium (Tab) Calcium 600 MG Take  by mouth.        Clopidogrel Bisulfate (Tab) PLAVIX 75 MG Take 1 Tab by mouth every day.        Losartan Potassium (Tab) COZAAR 100 MG TAKE 1 TABLET BY MOUTH EVERY DAY        Metoprolol Tartrate (Tab) LOPRESSOR 25 MG Take 1 Tab by mouth 2 Times a Day.        Omega-3 Fatty Acids (Cap) fish oil 1000 MG Take 1,000 mg by mouth 3 times a day, with meals.        .                 Medicines prescribed today were sent to:     [id number] [place] [facility] [address] [facility] [place]    [address] [facility] [place] [address]    Phone: [phone] Fax: [phone]    Open 24 Hours?: No      Medication refill instructions:       If your prescription bottle indicates you have medication refills left, it is not necessary to call your provider’s office. Please contact your pharmacy and they will refill your medication.    If your prescription bottle indicates you do not have any refills left, you may request refills at any time through one of the following ways: The online [employer] system (except Urgent Care), by calling your provider’s office, or by asking your pharmacy to contact your provider’s office with a refill request. Medication refills are processed only during regular business hours and may not be available until the next business day. Your provider may request additional information or to have a follow-up visit with you prior to refilling your medication.   *Please Note: Medication refills are assigned a new Rx number when refilled electronically. Your pharmacy may indicate that no  refills were authorized even though a new prescription for the same medication is available at the pharmacy. Please request the medicine by name with the pharmacy before contacting your provider for a refill.        Your To Do List     Future Labs/Procedures Complete By Expires    CAROTID DUPLEX  As directed 12/7/2018    CAROTID DUPLEX  As directed 12/7/2017    COMP METABOLIC PANEL  As directed 12/5/2017    ECHOCARDIOGRAM COMP W/O CONT  As directed 6/7/2018         MyChart Access Code: Activation code not generated  Current MyChart Status: Active

## 2017-06-12 ENCOUNTER — HOSPITAL ENCOUNTER (OUTPATIENT)
Dept: RADIOLOGY | Facility: MEDICAL CENTER | Age: 72
End: 2017-06-12
Attending: INTERNAL MEDICINE
Payer: MEDICARE

## 2017-06-12 ENCOUNTER — TELEPHONE (OUTPATIENT)
Dept: CARDIOLOGY | Facility: MEDICAL CENTER | Age: 72
End: 2017-06-12

## 2017-06-12 DIAGNOSIS — I65.21 STENOSIS OF RIGHT CAROTID ARTERY: ICD-10-CM

## 2017-06-12 PROCEDURE — 93880 EXTRACRANIAL BILAT STUDY: CPT

## 2017-06-12 PROCEDURE — 93880 EXTRACRANIAL BILAT STUDY: CPT | Mod: 26 | Performed by: INTERNAL MEDICINE

## 2017-06-12 NOTE — TELEPHONE ENCOUNTER
1215 PT notified.  Frances FRANK RN     PT called with message. No answer left  for call back.   Frances FRANK RN     ----- Message from Nahun Garcia M.D. sent at 6/12/2017 11:06 AM PDT -----  Please call the patient with abnormal carotid ultrasound showing occlusion of his right ICA. Please have him follow up with his vascular surgeon, Andrea Askew.    Thanks.  DANETTE

## 2017-06-21 ENCOUNTER — TELEPHONE (OUTPATIENT)
Dept: CARDIOLOGY | Facility: MEDICAL CENTER | Age: 72
End: 2017-06-21

## 2017-06-21 DIAGNOSIS — I65.21 STENOSIS OF RIGHT CAROTID ARTERY: ICD-10-CM

## 2017-06-21 DIAGNOSIS — I65.29 STENOSIS OF CAROTID ARTERY, UNSPECIFIED LATERALITY: ICD-10-CM

## 2017-06-21 NOTE — TELEPHONE ENCOUNTER
Please see Simplee messages with pt regarding his referral to Dr. Alamo for carotid artery stenosis. Part of communication is copied  below. Dr. Alamo's office was contacted and it was determined that pt had not been seen since 2014 and needed another referral. Referral placed per JI to vascular surgery, Dr. Alamo.       RE: Test Result Question  Message [id number]     From   Farnaz Pena R.N.    To   Joshua Becerra    Sent and Delivered   6/21/2017 10:22 AM        Last Read in [employer]   Not Read        Hi Ed. I just talked to Dr. Alamo's office and found out that you haven't been seen by Dr. Alamo since 2014 so they needed a new referral. I had thought Dr. Garcia placed a referral but he didn't as he thought you were an established patient with Dr. Alamo. I apologize for the misunderstanding. I just placed the referral to them. Our referral specialist will be sending your records with the referral. As you mentioned, if Dr. Alamo cannot treat your carotid stenosis they he will refer you to another member of the group.   Ray JEAN          Previous Messages       ----- Message -----      From: JOSHUA BECERRA      Sent: 6/21/2017  9:25 AM PDT        To: Farnaz Pena R.N.   Subject: RE: Test Result Question     Good Morning Ray just called the office of Dr. Andrea Alamo (spoke to Michelle) and was asked to request your office to fax her a copy of the latest carotid duplex test done June 12, 2017. Her fax number is [phone]. She said they have not received any referral or copy of my medical records.   Was also informed that I might see another physician or member of their group since Dr. Alamo does not handle carotid artery surgery anymore.   Thank you very much again and have a good day.   Clint Becerra

## 2017-06-21 NOTE — TELEPHONE ENCOUNTER
Message        Patient has been referred to [facility].       If patient is not contacted in a timely manner, please contact [phone]              Thank You      Noted. Pt requesting the 2014 carotid results faxed to office. Records printed and faxed to [phone]

## 2017-07-03 DIAGNOSIS — I65.23 BILATERAL CAROTID ARTERY STENOSIS: ICD-10-CM

## 2017-07-05 RX ORDER — CLOPIDOGREL BISULFATE 75 MG/1
TABLET ORAL
Qty: 30 TAB | Refills: 11 | Status: SHIPPED | OUTPATIENT
Start: 2017-07-05 | End: 2018-07-09 | Stop reason: SDUPTHER

## 2017-09-11 ENCOUNTER — HOSPITAL ENCOUNTER (OUTPATIENT)
Dept: LAB | Facility: MEDICAL CENTER | Age: 72
End: 2017-09-11
Attending: INTERNAL MEDICINE
Payer: MEDICARE

## 2017-09-11 DIAGNOSIS — I10 ESSENTIAL HYPERTENSION: ICD-10-CM

## 2017-09-11 DIAGNOSIS — E78.2 MIXED HYPERLIPIDEMIA: ICD-10-CM

## 2017-09-11 LAB
ALBUMIN SERPL BCP-MCNC: 4.5 G/DL (ref 3.2–4.9)
ALBUMIN/GLOB SERPL: 1.6 G/DL
ALP SERPL-CCNC: 43 U/L (ref 30–99)
ALT SERPL-CCNC: 19 U/L (ref 2–50)
ANION GAP SERPL CALC-SCNC: 8 MMOL/L (ref 0–11.9)
AST SERPL-CCNC: 20 U/L (ref 12–45)
BILIRUB SERPL-MCNC: 0.8 MG/DL (ref 0.1–1.5)
BUN SERPL-MCNC: 14 MG/DL (ref 8–22)
CALCIUM SERPL-MCNC: 10 MG/DL (ref 8.5–10.5)
CHLORIDE SERPL-SCNC: 104 MMOL/L (ref 96–112)
CHOLEST SERPL-MCNC: 119 MG/DL (ref 100–199)
CO2 SERPL-SCNC: 28 MMOL/L (ref 20–33)
CREAT SERPL-MCNC: 0.92 MG/DL (ref 0.5–1.4)
GFR SERPL CREATININE-BSD FRML MDRD: >60 ML/MIN/1.73 M 2
GLOBULIN SER CALC-MCNC: 2.9 G/DL (ref 1.9–3.5)
GLUCOSE SERPL-MCNC: 84 MG/DL (ref 65–99)
HDLC SERPL-MCNC: 50 MG/DL
LDLC SERPL CALC-MCNC: 52 MG/DL
POTASSIUM SERPL-SCNC: 4.2 MMOL/L (ref 3.6–5.5)
PROT SERPL-MCNC: 7.4 G/DL (ref 6–8.2)
SODIUM SERPL-SCNC: 140 MMOL/L (ref 135–145)
TRIGL SERPL-MCNC: 86 MG/DL (ref 0–149)

## 2017-09-11 PROCEDURE — 80053 COMPREHEN METABOLIC PANEL: CPT

## 2017-09-11 PROCEDURE — 36415 COLL VENOUS BLD VENIPUNCTURE: CPT

## 2017-09-11 PROCEDURE — 80061 LIPID PANEL: CPT

## 2017-09-15 ENCOUNTER — TELEPHONE (OUTPATIENT)
Dept: MEDICAL GROUP | Age: 72
End: 2017-09-15

## 2017-09-15 NOTE — TELEPHONE ENCOUNTER
ESTABLISHED PATIENT PRE-VISIT PLANNING     Note: Patient will not be contacted if there is no indication to call.     1.  Reviewed notes from the last few office visits within the medical group: Yes    2.  If any orders were placed at last visit or intended to be done for this visit (i.e. 6 mos follow-up), do we have Results/Consult Notes?        •  Labs - Labs ordered, completed and results are in chart.       •  Imaging - Imaging ordered, completed and results are in chart.       •  Referrals - No referrals were ordered at last office visit.    3. Is this appointment scheduled as a Hospital Follow-Up? No    4.  Immunizations were updated in Epic using WebIZ?: Epic matches WebIZ       •  Web Iz Recommendations: FLU, PREVNAR (PCV13)  and TD    5.  Patient is due for the following Health Maintenance Topics:   Health Maintenance Due   Topic Date Due   • Annual Wellness Visit  1945   • IMM PNEUMOCOCCAL 65+ (ADULT) LOW/MEDIUM RISK SERIES (2 of 2 - PCV13) 12/01/2015   • COLONOSCOPY  09/03/2016   • IMM INFLUENZA (1) 09/01/2017           6.  Patient was NOT informed to arrive 15 min prior to their scheduled appointment and bring in their medication bottles.

## 2017-09-17 NOTE — ASSESSMENT & PLAN NOTE
Stable. Currently taking metoprolol 25 mg twice a day and losartan 100 mg daily as directed.   He is taking baby aspirin daily.   He is monitoring BP at home.   Denies symptoms low BP: light-headed, tunnel-vision, unusual fatigue.   Denies symptoms high BP:pounding headache, visual changes, palpitations, flushed face.   Denies medicine side effects: unusual fatigue, slow heartbeat, foot/leg swelling, cough.  Recent CMP on 9/11/17 was within normal.

## 2017-09-17 NOTE — ASSESSMENT & PLAN NOTE
Patient is treated with double antiplatelet treatment with Aspirin 81 mg and Plavix 75 mg daily managed by cardiologist, Dr. Garcia and Dr. Alamo, vascular surgeon. We discussed again the risks and benefits of dual antiplatelet treatment today. Patient has carotid ultrasound on 6/12/17 showed right carotid artery occlusion. He was referred to vascular surgeon already by Dr. Garcia, his cardiologist for right carotid artery stenosis treatment.   Patient stated that he was seen by vascular surgeon, Dr. Bernal who did not recommend to do any surgery. Vascular surgeon recommended him to take Plavix and aspirin all the rest of his life. He follows with cardiologist, Dr. Garcia for dual platelet treatment.

## 2017-09-17 NOTE — ASSESSMENT & PLAN NOTE
Stable. Currently taking Lipitor 10 mg every evening and Omega 3 daily as directed.  Denies side effects--no myalgias or abdominal pain.  Reports diet is   He is exercising regularly.  He is taking ASA and Plavix daily for coronary artery disease and carotid artery stenosis as instructed by cardiologist and vascular surgeron.  He denies dizziness, claudication, or chest pain.    Results for JESENIA RODRÍGUEZ (MRN [id number]) as of 9/17/2017 13:46   Ref. Range 3/8/2017 08:53 9/11/2017 10:02   Cholesterol,Tot Latest Ref Range: 100 - 199 mg/dL 158 119   Triglycerides Latest Ref Range: 0 - 149 mg/dL 97 86   HDL Latest Ref Range: >=40 mg/dL 58 50   LDL Latest Ref Range: <100 mg/dL 81 52

## 2017-09-18 ENCOUNTER — OFFICE VISIT (OUTPATIENT)
Dept: MEDICAL GROUP | Age: 72
End: 2017-09-18
Payer: MEDICARE

## 2017-09-18 VITALS
SYSTOLIC BLOOD PRESSURE: 130 MMHG | HEIGHT: 67 IN | TEMPERATURE: 97.9 F | HEART RATE: 87 BPM | WEIGHT: 195 LBS | OXYGEN SATURATION: 93 % | BODY MASS INDEX: 30.61 KG/M2 | DIASTOLIC BLOOD PRESSURE: 74 MMHG

## 2017-09-18 DIAGNOSIS — E66.9 OBESITY (BMI 30.0-34.9): ICD-10-CM

## 2017-09-18 DIAGNOSIS — I10 ESSENTIAL HYPERTENSION: ICD-10-CM

## 2017-09-18 DIAGNOSIS — E78.2 MIXED HYPERLIPIDEMIA: ICD-10-CM

## 2017-09-18 DIAGNOSIS — Z23 NEED FOR VACCINATION: ICD-10-CM

## 2017-09-18 DIAGNOSIS — I65.21 STENOSIS OF RIGHT CAROTID ARTERY: ICD-10-CM

## 2017-09-18 PROCEDURE — G0009 ADMIN PNEUMOCOCCAL VACCINE: HCPCS | Performed by: INTERNAL MEDICINE

## 2017-09-18 PROCEDURE — 90662 IIV NO PRSV INCREASED AG IM: CPT | Performed by: INTERNAL MEDICINE

## 2017-09-18 PROCEDURE — 90670 PCV13 VACCINE IM: CPT | Performed by: INTERNAL MEDICINE

## 2017-09-18 PROCEDURE — 99214 OFFICE O/P EST MOD 30 MIN: CPT | Mod: 25 | Performed by: INTERNAL MEDICINE

## 2017-09-18 PROCEDURE — G0008 ADMIN INFLUENZA VIRUS VAC: HCPCS | Performed by: INTERNAL MEDICINE

## 2017-09-18 RX ORDER — ATORVASTATIN CALCIUM 10 MG/1
10 TABLET, FILM COATED ORAL DAILY
Qty: 90 TAB | Refills: 3 | Status: SHIPPED | OUTPATIENT
Start: 2017-09-18 | End: 2018-11-24 | Stop reason: SDUPTHER

## 2017-09-18 ASSESSMENT — PAIN SCALES - GENERAL: PAINLEVEL: NO PAIN

## 2017-09-18 NOTE — PROGRESS NOTES
Subjective:   Joshua Becerra is a 72 y.o. male here today for evaluation and management of:      Mixed hyperlipidemia  Stable. Currently taking Lipitor 10 mg every evening and Omega 3 daily as directed.  Denies side effects--no myalgias or abdominal pain.  Reports diet is   He is exercising regularly.  He is taking ASA and Plavix daily for coronary artery disease and carotid artery stenosis as instructed by cardiologist and vascular surgeron.  He denies dizziness, claudication, or chest pain.    Results for JOSHUA BECERRA (MRN [id number]) as of 9/17/2017 13:46   Ref. Range 3/8/2017 08:53 9/11/2017 10:02   Cholesterol,Tot Latest Ref Range: 100 - 199 mg/dL 158 119   Triglycerides Latest Ref Range: 0 - 149 mg/dL 97 86   HDL Latest Ref Range: >=40 mg/dL 58 50   LDL Latest Ref Range: <100 mg/dL 81 52       Essential hypertension  Stable. Currently taking metoprolol 25 mg twice a day and losartan 100 mg daily as directed.   He is taking baby aspirin daily.   He is monitoring BP at home.   Denies symptoms low BP: light-headed, tunnel-vision, unusual fatigue.   Denies symptoms high BP:pounding headache, visual changes, palpitations, flushed face.   Denies medicine side effects: unusual fatigue, slow heartbeat, foot/leg swelling, cough.  Recent CMP on 9/11/17 was within normal.      Stenosis of right carotid artery  Patient is treated with double antiplatelet treatment with Aspirin 81 mg and Plavix 75 mg daily managed by cardiologist, Dr. Garcia and Dr. Alamo, vascular surgeon. We discussed again the risks and benefits of dual antiplatelet treatment today. Patient has carotid ultrasound on 6/12/17 showed right carotid artery occlusion. He was referred to vascular surgeon already by Dr. Garcia, his cardiologist for right carotid artery stenosis treatment.   Patient stated that he was seen by vascular surgeon, Dr. Bernal who did not recommend to do any surgery. Vascular surgeon recommended him to take  "Plavix and aspirin all the rest of his life. He follows with cardiologist, Dr. Garcia for dual platelet treatment.         Current medicines (including changes today)  Current Outpatient Prescriptions   Medication Sig Dispense Refill   • atorvastatin (LIPITOR) 10 MG Tab Take 1 Tab by mouth every day. 90 Tab 3   • clopidogrel (PLAVIX) 75 MG Tab TAKE 1 TAB BY MOUTH EVERY DAY. 30 Tab 11   • metoprolol (LOPRESSOR) 25 MG Tab Take 1 Tab by mouth 2 Times a Day. 180 Tab 3   • losartan (COZAAR) 100 MG Tab TAKE 1 TABLET BY MOUTH EVERY DAY 90 Tab 3   • Calcium 600 MG Tab Take  by mouth.     • aspirin EC (ECOTRIN) 81 MG Tablet Delayed Response Take 81 mg by mouth every day.     • Omega-3 Fatty Acids (FISH OIL) 1000 MG Cap capsule Take 1,000 mg by mouth 3 times a day, with meals.       No current facility-administered medications for this visit.      He  has a past medical history of CAD (coronary artery disease); Cancer (CMS-[facility]); High cholesterol; Hyperlipidemia; Hypertension; Unspecified cataract; and Unspecified urinary incontinence.    ROS   No chest pain, no shortness of breath, no abdominal pain       Objective:     Blood pressure 130/74, pulse 87, temperature 36.6 °C (97.9 °F), height 1.702 m (5' 7\"), weight 88.5 kg (195 lb), SpO2 93 %. Body mass index is 30.54 kg/m².   Physical Exam:  General: Alert, oriented and no acute distress.  Eye contact is good, speech goal directed, affect calm  HEENT: conjunctiva non-injected, sclera non-icteric.  Oral mucous membranes pink and moist with no lesions.  Pinna normal.   Lungs: Normal respiratory effort, clear to auscultation bilaterally with good excursion.  CV: regular rate and rhythm. No murmurs.  Abdomen: soft, non distended, nontender, Bowel sound normal.  Ext: no edema, color normal, vascularity normal, temperature normal        Assessment and Plan:   The following treatment plan was discussed     1. Mixed hyperlipidemia  - Well-controlled. Continue current regimens. Recheck " lab 1-2 weeks before next follow up visit. Refilled Lipitor today.  - Advised to eat low fat, low carbohydrate and high fiber diet as well as do cardio physical exercise regularly.   - atorvastatin (LIPITOR) 10 MG Tab; Take 1 Tab by mouth every day.  Dispense: 90 Tab; Refill: 3  - COMP METABOLIC PANEL; Future  - LIPID PROFILE; Future    2. Essential hypertension  - Well-controlled. Continue current regimens. Recheck lab 1-2 weeks before next follow up visit.  - Recommend to monitor blood pressure and heart rate at home.  - CBC WITH DIFFERENTIAL; Future  - COMP METABOLIC PANEL; Future    3. Stenosis of right carotid artery  - Patient will continue Plavix and aspirin as managed by cardiologist. He was seen by vascular surgeon, who did not recommend to do surgery currently. Advised to monitor signs and symptoms of stroke.    4. Obesity (BMI 30.0-34.9)  - Counseled for healthy diet and regular physical exercise to lose weight. Patient tried to have exercise daily with biking and walking.  - Patient identified as having weight management issue.  Appropriate orders and counseling given.    5. Need for vaccination  - Influenza and Prevnar 13 vaccine were given today after reviewing risks and benefits as well as side effects of vaccine.  - INFLUENZA VACCINE, HIGH DOSE (65+ ONLY)  - Pneumococcal Conjugate Vaccine 13-Valent <6yo IM    6. Health Maintenance   - Patient is overdue for colon cancer screening. He was referred to GI consultant in the past and he has not followed with them. Patient will call to schedule with GI consultant for colonoscopy. I reprinted information of GI consultant for contact.    Followup: Return in about 6 months (around 3/18/2018), or if symptoms worsen or fail to improve, for hypertension, hyperlipidemia, CAD status post CABG, right carotid artery stenosis.      Please note that this dictation was created using voice recognition software. I have made every reasonable attempt to correct obvious  errors, but I expect that there may have unintended errors in text, spelling, punctuation, or grammar that I did not discover.

## 2018-01-04 ENCOUNTER — OFFICE VISIT (OUTPATIENT)
Dept: URGENT CARE | Facility: CLINIC | Age: 73
End: 2018-01-04
Payer: MEDICARE

## 2018-01-04 VITALS
HEIGHT: 67 IN | TEMPERATURE: 97.1 F | BODY MASS INDEX: 30.76 KG/M2 | SYSTOLIC BLOOD PRESSURE: 110 MMHG | DIASTOLIC BLOOD PRESSURE: 70 MMHG | WEIGHT: 196 LBS | HEART RATE: 84 BPM | OXYGEN SATURATION: 94 %

## 2018-01-04 DIAGNOSIS — R05.9 COUGH: ICD-10-CM

## 2018-01-04 DIAGNOSIS — J06.9 UPPER RESPIRATORY TRACT INFECTION, UNSPECIFIED TYPE: ICD-10-CM

## 2018-01-04 LAB
FLUAV+FLUBV AG SPEC QL IA: NORMAL
INT CON NEG: NEGATIVE
INT CON POS: POSITIVE

## 2018-01-04 PROCEDURE — 87804 INFLUENZA ASSAY W/OPTIC: CPT | Performed by: NURSE PRACTITIONER

## 2018-01-04 PROCEDURE — 99213 OFFICE O/P EST LOW 20 MIN: CPT | Performed by: NURSE PRACTITIONER

## 2018-01-04 ASSESSMENT — ENCOUNTER SYMPTOMS
SHORTNESS OF BREATH: 0
SENSORY CHANGE: 0
NAUSEA: 0
ORTHOPNEA: 0
HEADACHES: 1
VOMITING: 0
COUGH: 1
BACK PAIN: 0
EYE PAIN: 0
DIARRHEA: 0
FOCAL WEAKNESS: 0
MYALGIAS: 0
BLURRED VISION: 0
NECK PAIN: 0
FEVER: 0
TINGLING: 0
ABDOMINAL PAIN: 0
SORE THROAT: 1
DIZZINESS: 0
SPUTUM PRODUCTION: 1
PALPITATIONS: 0
WHEEZING: 0
CHILLS: 1
CONSTIPATION: 0
HEMOPTYSIS: 0
BRUISES/BLEEDS EASILY: 0

## 2018-01-04 ASSESSMENT — COPD QUESTIONNAIRES: COPD: 0

## 2018-01-04 ASSESSMENT — LIFESTYLE VARIABLES: SUBSTANCE_ABUSE: 0

## 2018-01-04 NOTE — PROGRESS NOTES
"Subjective:      Joshua Becerra is a 72 y.o. male who presents with Cough            Cough   This is a new problem. The current episode started in the past 7 days. The problem has been unchanged. The cough is non-productive. Associated symptoms include chills, headaches and a sore throat. Pertinent negatives include no chest pain, ear pain, fever, hemoptysis, myalgias, rash, shortness of breath or wheezing. Nothing aggravates the symptoms. He has tried nothing for the symptoms. The treatment provided mild relief. There is no history of asthma, bronchiectasis, bronchitis, COPD, emphysema, environmental allergies or pneumonia.       Review of Systems   Constitutional: Positive for chills. Negative for fever.   HENT: Positive for sore throat. Negative for ear pain.    Eyes: Negative for blurred vision and pain.   Respiratory: Positive for cough and sputum production. Negative for hemoptysis, shortness of breath and wheezing.    Cardiovascular: Negative for chest pain, palpitations and orthopnea.   Gastrointestinal: Negative for abdominal pain, constipation, diarrhea, nausea and vomiting.   Genitourinary: Negative for dysuria.   Musculoskeletal: Negative for back pain, myalgias and neck pain.   Skin: Negative for rash.   Neurological: Positive for headaches. Negative for dizziness, tingling, sensory change and focal weakness.   Endo/Heme/Allergies: Negative for environmental allergies. Does not bruise/bleed easily.   Psychiatric/Behavioral: Negative for substance abuse.          Objective:     /70   Pulse 84   Temp 36.2 °C (97.1 °F)   Ht 1.702 m (5' 7\")   Wt 88.9 kg (196 lb)   SpO2 94%   BMI 30.70 kg/m²      Physical Exam   Constitutional: He is oriented to person, place, and time. Vital signs are normal. He appears well-developed and well-nourished.  Non-toxic appearance. No distress.   HENT:   Head: Normocephalic and atraumatic.   Right Ear: External ear normal.   Left Ear: External ear normal. "   Nose: Nose normal.   Mouth/Throat: Oropharynx is clear and moist.   Eyes: Conjunctivae are normal. Pupils are equal, round, and reactive to light. Right eye exhibits no discharge. Left eye exhibits no discharge.   Neck: Neck supple.   Cardiovascular: Normal rate and regular rhythm.    Pulmonary/Chest: Effort normal and breath sounds normal.   Lymphadenopathy:     He has no cervical adenopathy.        Right: No supraclavicular adenopathy present.        Left: No supraclavicular adenopathy present.   Neurological: He is alert and oriented to person, place, and time.   Skin: Skin is warm and dry.   Nursing note and vitals reviewed.         Influenza : negative     Assessment/Plan:     1. Cough  POCT Influenza A/B   2. Upper respiratory tract infection, unspecified type       Educated in natural progression of disease with supportive care and symptomatic relief of symptoms. Educated in s/s that would need further evaluation and management in ER, UC or by PCP. Follow up prn for new or worsening symptoms.   Keep well hydrated- Increase rest  Treat fever > 101.5 with OTC ibuprofen or acetaminophen. Follow Dosage and safety directions of the .   Educated in infection control practices.   Do not return to work until fever free for 24 hours.     Patient was in agreement with this treatment plan and seemed to understand without barriers. All questions were encouraged and answered      FU 5-7 days prn   Keep well hydrated

## 2018-02-28 DIAGNOSIS — I10 ESSENTIAL HYPERTENSION: ICD-10-CM

## 2018-03-01 DIAGNOSIS — I10 ESSENTIAL HYPERTENSION: ICD-10-CM

## 2018-03-03 RX ORDER — LOSARTAN POTASSIUM 100 MG/1
TABLET ORAL
Qty: 90 TAB | Refills: 3 | Status: SHIPPED | OUTPATIENT
Start: 2018-03-03 | End: 2019-02-20 | Stop reason: SDUPTHER

## 2018-03-13 ENCOUNTER — HOSPITAL ENCOUNTER (OUTPATIENT)
Dept: LAB | Facility: MEDICAL CENTER | Age: 73
End: 2018-03-13
Attending: INTERNAL MEDICINE
Payer: MEDICARE

## 2018-03-13 DIAGNOSIS — E78.2 MIXED HYPERLIPIDEMIA: ICD-10-CM

## 2018-03-13 DIAGNOSIS — I10 ESSENTIAL HYPERTENSION: ICD-10-CM

## 2018-03-13 LAB
ALBUMIN SERPL BCP-MCNC: 4.5 G/DL (ref 3.2–4.9)
ALBUMIN/GLOB SERPL: 1.6 G/DL
ALP SERPL-CCNC: 45 U/L (ref 30–99)
ALT SERPL-CCNC: 23 U/L (ref 2–50)
ANION GAP SERPL CALC-SCNC: 5 MMOL/L (ref 0–11.9)
AST SERPL-CCNC: 22 U/L (ref 12–45)
BASOPHILS # BLD AUTO: 1.4 % (ref 0–1.8)
BASOPHILS # BLD: 0.08 K/UL (ref 0–0.12)
BILIRUB SERPL-MCNC: 0.5 MG/DL (ref 0.1–1.5)
BUN SERPL-MCNC: 20 MG/DL (ref 8–22)
CALCIUM SERPL-MCNC: 9.6 MG/DL (ref 8.5–10.5)
CHLORIDE SERPL-SCNC: 107 MMOL/L (ref 96–112)
CHOLEST SERPL-MCNC: 141 MG/DL (ref 100–199)
CO2 SERPL-SCNC: 28 MMOL/L (ref 20–33)
CREAT SERPL-MCNC: 0.93 MG/DL (ref 0.5–1.4)
EOSINOPHIL # BLD AUTO: 0.08 K/UL (ref 0–0.51)
EOSINOPHIL NFR BLD: 1.4 % (ref 0–6.9)
ERYTHROCYTE [DISTWIDTH] IN BLOOD BY AUTOMATED COUNT: 45.2 FL (ref 35.9–50)
GLOBULIN SER CALC-MCNC: 2.8 G/DL (ref 1.9–3.5)
GLUCOSE SERPL-MCNC: 99 MG/DL (ref 65–99)
HCT VFR BLD AUTO: 47.8 % (ref 42–52)
HDLC SERPL-MCNC: 51 MG/DL
HGB BLD-MCNC: 15.5 G/DL (ref 14–18)
IMM GRANULOCYTES # BLD AUTO: 0.02 K/UL (ref 0–0.11)
IMM GRANULOCYTES NFR BLD AUTO: 0.4 % (ref 0–0.9)
LDLC SERPL CALC-MCNC: 74 MG/DL
LYMPHOCYTES # BLD AUTO: 1.52 K/UL (ref 1–4.8)
LYMPHOCYTES NFR BLD: 27.5 % (ref 22–41)
MCH RBC QN AUTO: 32.4 PG (ref 27–33)
MCHC RBC AUTO-ENTMCNC: 32.4 G/DL (ref 33.7–35.3)
MCV RBC AUTO: 100 FL (ref 81.4–97.8)
MONOCYTES # BLD AUTO: 0.35 K/UL (ref 0–0.85)
MONOCYTES NFR BLD AUTO: 6.3 % (ref 0–13.4)
NEUTROPHILS # BLD AUTO: 3.48 K/UL (ref 1.82–7.42)
NEUTROPHILS NFR BLD: 63 % (ref 44–72)
NRBC # BLD AUTO: 0 K/UL
NRBC BLD-RTO: 0 /100 WBC
PLATELET # BLD AUTO: 247 K/UL (ref 164–446)
PMV BLD AUTO: 11.4 FL (ref 9–12.9)
POTASSIUM SERPL-SCNC: 4.7 MMOL/L (ref 3.6–5.5)
PROT SERPL-MCNC: 7.3 G/DL (ref 6–8.2)
RBC # BLD AUTO: 4.78 M/UL (ref 4.7–6.1)
SODIUM SERPL-SCNC: 140 MMOL/L (ref 135–145)
TRIGL SERPL-MCNC: 78 MG/DL (ref 0–149)
WBC # BLD AUTO: 5.5 K/UL (ref 4.8–10.8)

## 2018-03-13 PROCEDURE — 80061 LIPID PANEL: CPT

## 2018-03-13 PROCEDURE — 80053 COMPREHEN METABOLIC PANEL: CPT

## 2018-03-13 PROCEDURE — 36415 COLL VENOUS BLD VENIPUNCTURE: CPT

## 2018-03-13 PROCEDURE — 85025 COMPLETE CBC W/AUTO DIFF WBC: CPT

## 2018-03-15 ENCOUNTER — TELEPHONE (OUTPATIENT)
Dept: MEDICAL GROUP | Age: 73
End: 2018-03-15

## 2018-03-15 NOTE — TELEPHONE ENCOUNTER
Future Appointments       Provider Department Center    3/19/2018 10:00 AM Libra Washington M.D. [facility]    5/16/2018 9:15 AM [facility] EXAM 12; ECHO [facility] ECHOCARDIOLOGY [facility]    5/16/2018 10:15 AM [facility] EXAM 6; VASCULAR LAB [facility] NON-INVASIVE LAB [facility]        ESTABLISHED PATIENT PRE-VISIT PLANNING     Note: Patient will not be contacted if there is no indication to call.     1.  Reviewed notes from the last few office visits within the medical group: Yes    2.  If any orders were placed at last visit or intended to be done for this visit (i.e. 6 mos follow-up), do we have Results/Consult Notes?        •  Labs - Labs ordered, completed on 3/13/18 and results are in chart.   Note: If patient appointment is for lab review and patient did not complete labs, check with provider if OK to reschedule patient until labs completed.       •  Imaging - Imaging was not ordered at last office visit.       •  Referrals - No referrals were ordered at last office visit.    3. Is this appointment scheduled as a Hospital Follow-Up? No    4.  Immunizations were updated in Epic using WebIZ?: Epic matches WebIZ       •  Web Iz Recommendations: Patient is up to date on all vaccines    5.  Patient is due for the following Health Maintenance Topics:   Health Maintenance Due   Topic Date Due   • Annual Wellness Visit  1945   • COLONOSCOPY  09/03/2016           6.  MDX printed and highlighted for Provider? YES    7.  Patient was NOT informed to arrive 15 min prior to their scheduled appointment and bring in their medication bottles.

## 2018-03-19 ENCOUNTER — OFFICE VISIT (OUTPATIENT)
Dept: MEDICAL GROUP | Age: 73
End: 2018-03-19
Payer: MEDICARE

## 2018-03-19 VITALS
OXYGEN SATURATION: 94 % | HEIGHT: 68 IN | HEART RATE: 85 BPM | TEMPERATURE: 97.1 F | BODY MASS INDEX: 29.7 KG/M2 | SYSTOLIC BLOOD PRESSURE: 110 MMHG | DIASTOLIC BLOOD PRESSURE: 65 MMHG | WEIGHT: 196 LBS

## 2018-03-19 DIAGNOSIS — I25.10 CORONARY ARTERY DISEASE INVOLVING NATIVE CORONARY ARTERY OF NATIVE HEART WITHOUT ANGINA PECTORIS: ICD-10-CM

## 2018-03-19 DIAGNOSIS — Z12.11 SCREENING FOR COLON CANCER: ICD-10-CM

## 2018-03-19 DIAGNOSIS — E78.2 MIXED HYPERLIPIDEMIA: ICD-10-CM

## 2018-03-19 DIAGNOSIS — I10 ESSENTIAL HYPERTENSION: ICD-10-CM

## 2018-03-19 PROCEDURE — 99214 OFFICE O/P EST MOD 30 MIN: CPT | Performed by: INTERNAL MEDICINE

## 2018-03-19 ASSESSMENT — PATIENT HEALTH QUESTIONNAIRE - PHQ9: CLINICAL INTERPRETATION OF PHQ2 SCORE: 0

## 2018-03-19 NOTE — ASSESSMENT & PLAN NOTE
Patient is taking losartan 100 mg daily and metoprolol 25 mg twice a day. He is taking baby aspirin daily and Plavix 75 mg daily. He denies side effects from taking losartan and hydrochlorothiazide., His blood pressure is stable and well-controlled with current regimens.

## 2018-03-19 NOTE — ASSESSMENT & PLAN NOTE
Patient is taking Lipitor 10 mg every evening and omega-3 daily. He denies side effects from taking Lipitor. He stated that he has not been exercising regularly lately. His LDL cholesterol was 74 on 3/13/18.    Results for JESENIA RODRÍGUEZ (MRN [id number]) as of 3/19/2018 12:14   Ref. Range 3/13/2018 09:03   Cholesterol,Tot Latest Ref Range: 100 - 199 mg/dL 141   Triglycerides Latest Ref Range: 0 - 149 mg/dL 78   HDL Latest Ref Range: >=40 mg/dL 51   LDL Latest Ref Range: <100 mg/dL 74

## 2018-03-19 NOTE — ASSESSMENT & PLAN NOTE
Patient stated that he is doing well. He does not have regular exercise lately due to busy work schedule. He will try to start a walking exercise. He is taking aspirin 81 mg daily and Plavix 75 mg daily. He also take metoprolol 25 mg twice a day he looks at 100 mg daily and Lipitor 10 mg every evening. Patient denied chest pain or shortness of breath.    His vascular surgeon, Dr. Hogan would like to keep Plavix for carotid artery stenosis. They do not recommend to do surgery. His cardiologist also wants to keep aspirin daily. Patient with discomfort with cardiologist whether he needs to continue dual antiplatelet medication. He denies side effects from taking aspirin and Plavix together. Denies abnormal bleeding.

## 2018-03-19 NOTE — PROGRESS NOTES
Subjective:   Joshua Becerra is a 72 y.o. male here today for evaluation and management of:      Mixed hyperlipidemia  Patient is taking Lipitor 10 mg every evening and omega-3 daily. He denies side effects from taking Lipitor. He stated that he has not been exercising regularly lately. His LDL cholesterol was 74 on 3/13/18.    Results for JOSHUA BECERRA (MRN [id number]) as of 3/19/2018 12:14   Ref. Range 3/13/2018 09:03   Cholesterol,Tot Latest Ref Range: 100 - 199 mg/dL 141   Triglycerides Latest Ref Range: 0 - 149 mg/dL 78   HDL Latest Ref Range: >=40 mg/dL 51   LDL Latest Ref Range: <100 mg/dL 74       Essential hypertension  Patient is taking losartan 100 mg daily and metoprolol 25 mg twice a day. He is taking baby aspirin daily and Plavix 75 mg daily. He denies side effects from taking losartan and hydrochlorothiazide., His blood pressure is stable and well-controlled with current regimens.    CAD (coronary artery disease)  Patient stated that he is doing well. He does not have regular exercise lately due to busy work schedule. He will try to start a walking exercise. He is taking aspirin 81 mg daily and Plavix 75 mg daily. He also take metoprolol 25 mg twice a day he looks at 100 mg daily and Lipitor 10 mg every evening. Patient denied chest pain or shortness of breath.    His vascular surgeon, Dr. Hogan would like to keep Plavix for carotid artery stenosis. They do not recommend to do surgery. His cardiologist also wants to keep aspirin daily. Patient with discomfort with cardiologist whether he needs to continue dual antiplatelet medication. He denies side effects from taking aspirin and Plavix together. Denies abnormal bleeding.          Current medicines (including changes today)  Current Outpatient Prescriptions   Medication Sig Dispense Refill   • losartan (COZAAR) 100 MG Tab TAKE 1 TABLET BY MOUTH EVERY DAY 90 Tab 3   • metoprolol (LOPRESSOR) 25 MG Tab TAKE 1 TAB BY MOUTH 2 TIMES A  "DAY. 180 Tab 3   • atorvastatin (LIPITOR) 10 MG Tab Take 1 Tab by mouth every day. 90 Tab 3   • clopidogrel (PLAVIX) 75 MG Tab TAKE 1 TAB BY MOUTH EVERY DAY. 30 Tab 11   • Calcium 600 MG Tab Take  by mouth.     • aspirin EC (ECOTRIN) 81 MG Tablet Delayed Response Take 81 mg by mouth every day.     • Omega-3 Fatty Acids (FISH OIL) 1000 MG Cap capsule Take 1,000 mg by mouth 3 times a day, with meals.       No current facility-administered medications for this visit.      He  has a past medical history of CAD (coronary artery disease); Cancer ([facility]); High cholesterol; Hyperlipidemia; Hypertension; Unspecified cataract; and Unspecified urinary incontinence.    ROS   No chest pain, no shortness of breath, no abdominal pain       Objective:     Blood pressure 110/65, pulse 85, temperature 36.2 °C (97.1 °F), height 1.727 m (5' 8\"), weight 88.9 kg (196 lb), SpO2 94 %. Body mass index is 29.8 kg/m².   Physical Exam:  General: Alert, oriented and no acute distress.  Eye contact is good, speech goal directed, affect calm  HEENT: conjunctiva non-injected, sclera non-icteric.  Oral mucous membranes pink and moist with no lesions.  Pinna normal.   Lungs: Normal respiratory effort, clear to auscultation bilaterally with good excursion.  CV: regular rate and rhythm. No murmurs.   Abdomen: soft, non distended, nontender, Bowel sound normal.  Ext: no edema, color normal, vascularity normal, temperature normal      Assessment and Plan:   The following treatment plan was discussed     1. Mixed hyperlipidemia  - LDL 74. We will continue Lipitor 10 mg every evening. Reviewed potential side effects of Lipitor with patient.  - Advised to eat low fat, low carbohydrate and high fiber diet as well as do cardio physical exercise regularly.   - COMP METABOLIC PANEL; Future  - LIPID PROFILE; Future    2. Essential hypertension  - Well-controlled. Continue current regimens. Recheck lab 1-2 weeks before next follow up visit.  - Recommend to " monitor blood pressure and heart rate at home.    3. Coronary artery disease involving native coronary artery of native heart without angina pectoris  - Well-controlled. Continue current regimens. Recheck lab 1-2 weeks before next follow up visit.  - Patient has follow-up appointment with cardiologist in [place]. Patient is advised to discuss with cardiologist whether he needs to continue dual antiplatelet medication with Plavix and aspirin. I reviewed potential side effect of bleeding risk from taking both Plavix and aspirin together. Patient denies side effects from taking it and he will continue both Plavix and aspirin for now. Patient is advised to monitor side effects and potential bleeding.    4. Screening for colon cancer  - Counseling for colon cancer screening. Patient is due for colonoscopy. He requests to refer back to GI consultant.  - REFERRAL TO GASTROENTEROLOGY        Followup: Return in about 6 months (around 9/19/2018), or if symptoms worsen or fail to improve, for hypertension, hyperlipidemia, CAD, lab review.      Please note that this dictation was created using voice recognition software. I have made every reasonable attempt to correct obvious errors, but I expect that there may have unintended errors in text, spelling, punctuation, or grammar that I did not discover.

## 2018-03-19 NOTE — LETTER
Jounce  Libra Washington M.D.  [address]  [place] [address]  Fax: [phone]   Authorization for Release/Disclosure of   Protected Health Information   Name: JOSHUA BECERRA : 1945 SSN: [id number]   Address: [facility] [address]   [place] [address] Phone:    [phone] (home)    I authorize the entity listed below to release/disclose the PHI below to:   Jounce/Libra Washington M.D. and Libra Washington M.D.   Provider or Entity Name:  [facility]   Address   City, [facility], [facility]   Phone:  [phone]    Fax:     Reason for request: continuity of care   Information to be released:    [ X ] LAST COLONOSCOPY,  including any PATH REPORT and follow-up  [  ] LAST FIT/COLOGUARD RESULT [  ] LAST DEXA  [  ] LAST MAMMOGRAM  [  ] LAST PAP  [  ] LAST LABS [  ] RETINA EXAM REPORT  [  ] IMMUNIZATION RECORDS  [ X ] Release all info      [  ] Check here and initial the line next to each item to release ALL health information INCLUDING  _____ Care and treatment for drug and / or alcohol abuse  _____ HIV testing, infection status, or AIDS  _____ Genetic Testing    DATES OF SERVICE OR TIME PERIOD TO BE DISCLOSED: _____________  I understand and acknowledge that:  * This Authorization may be revoked at any time by you in writing, except if your health information has already been used or disclosed.  * Your health information that will be used or disclosed as a result of you signing this authorization could be re-disclosed by the recipient. If this occurs, your re-disclosed health information may no longer be protected by State or Federal laws.  * You may refuse to sign this Authorization. Your refusal will not affect your ability to obtain treatment.  * This Authorization becomes effective upon signing and will  on (date) __________.      If no date is indicated, this Authorization will  one (1) year from the signature date.    Name: Joshua Becerra    Signature:   Date:     3/19/2018       PLEASE FAX REQUESTED RECORDS BACK TO: [phone]

## 2018-05-17 ENCOUNTER — HOSPITAL ENCOUNTER (OUTPATIENT)
Dept: RADIOLOGY | Facility: MEDICAL CENTER | Age: 73
End: 2018-05-17
Attending: INTERNAL MEDICINE
Payer: MEDICARE

## 2018-05-17 ENCOUNTER — TELEPHONE (OUTPATIENT)
Dept: CARDIOLOGY | Facility: MEDICAL CENTER | Age: 73
End: 2018-05-17

## 2018-05-17 ENCOUNTER — HOSPITAL ENCOUNTER (OUTPATIENT)
Dept: CARDIOLOGY | Facility: MEDICAL CENTER | Age: 73
End: 2018-05-17
Attending: INTERNAL MEDICINE
Payer: MEDICARE

## 2018-05-17 DIAGNOSIS — I25.10 CORONARY ARTERY DISEASE INVOLVING NATIVE CORONARY ARTERY OF NATIVE HEART WITHOUT ANGINA PECTORIS: ICD-10-CM

## 2018-05-17 DIAGNOSIS — I65.21 STENOSIS OF RIGHT CAROTID ARTERY: ICD-10-CM

## 2018-05-17 DIAGNOSIS — E04.1 THYROID NODULE: ICD-10-CM

## 2018-05-17 DIAGNOSIS — I35.0 AORTIC VALVE STENOSIS, UNSPECIFIED ETIOLOGY: ICD-10-CM

## 2018-05-17 DIAGNOSIS — Z95.1 S/P CABG (CORONARY ARTERY BYPASS GRAFT): ICD-10-CM

## 2018-05-17 LAB
LV EJECT FRACT  99904: 60
LV EJECT FRACT MOD 2C 99903: 37.31
LV EJECT FRACT MOD 4C 99902: 42.16
LV EJECT FRACT MOD BP 99901: 37.99

## 2018-05-17 PROCEDURE — 93880 EXTRACRANIAL BILAT STUDY: CPT | Mod: 26 | Performed by: INTERNAL MEDICINE

## 2018-05-17 PROCEDURE — 93306 TTE W/DOPPLER COMPLETE: CPT

## 2018-05-17 PROCEDURE — 93880 EXTRACRANIAL BILAT STUDY: CPT

## 2018-05-17 PROCEDURE — 93306 TTE W/DOPPLER COMPLETE: CPT | Mod: 26 | Performed by: INTERNAL MEDICINE

## 2018-05-17 NOTE — TELEPHONE ENCOUNTER
CAROTID DUPLEX   Order: [id number]   Status:  Preliminary result   Visible to patient:  No (Not Released) Dx:  Stenosis of right carotid artery   Notes recorded by KAREN Frost on 5/17/2018 at 9:26 AM PDT  Mild plaquing. L thyroid gland incidental finding of calcified structure. PCP review. FU as planned. FU with vascular as warranted. SC     Pt notified of incidental findings via mychart.     To Dr. Washington

## 2018-05-17 NOTE — TELEPHONE ENCOUNTER
Phone Number Called: [phone] (home)       Message: PATIENT WAS INFORMED OF DR. JOSEPH'S MESSAGE. PATIENT WAS GIVEN PHONE NUMBER TO SCHEDULE NECK ULTRASOUND.     Left Message for patient to call back: N\A

## 2018-05-17 NOTE — TELEPHONE ENCOUNTER
Please call to inform patient that I received a message from cardiology department regarding his duplex carotid artery ultrasound. There is an incidental finding of a calcified lesion on left thyroid gland from carotid duplex ultrasound.  I ordered neck ultrasound for further workup on thyroid gland. Please advise patient to schedule to do neck ultrasound. He can call [phone] for neck ultrasound.    Libra Washington M.D.

## 2018-05-22 ENCOUNTER — HOSPITAL ENCOUNTER (OUTPATIENT)
Dept: RADIOLOGY | Facility: MEDICAL CENTER | Age: 73
End: 2018-05-22
Attending: INTERNAL MEDICINE
Payer: MEDICARE

## 2018-05-22 DIAGNOSIS — E04.1 THYROID NODULE: ICD-10-CM

## 2018-05-22 PROCEDURE — 76536 US EXAM OF HEAD AND NECK: CPT

## 2018-05-23 ENCOUNTER — PATIENT MESSAGE (OUTPATIENT)
Dept: MEDICAL GROUP | Age: 73
End: 2018-05-23

## 2018-05-23 DIAGNOSIS — E04.1 THYROID NODULE: ICD-10-CM

## 2018-05-23 NOTE — TELEPHONE ENCOUNTER
Recent neck ultrasound showed that he has 2.3 cm calcified nodule on left thyroid. Consulted interventional radiologist for ultrasound-guided biopsy. The referral to endocrinologist was placed.    Libra Washington M.D.

## 2018-05-23 NOTE — TELEPHONE ENCOUNTER
From: Joshua Becerra  To: Libra Washington M.D.  Sent: 5/23/2018 6:41 AM PDT  Subject: Non-Urgent Medical Question    Good Morning Dr. Washington, I had the ultrasound that you ordered yesterday at [facility] on [facility] and hopefully you will get the official results and advise me.  I got a bad cold and would like to know if I can take decongestant medication like Dayquil or Robitussin. If not can you please advise me what I can take for it.  Thank you very much.  Clint Becerra

## 2018-06-12 ENCOUNTER — OFFICE VISIT (OUTPATIENT)
Dept: CARDIOLOGY | Facility: MEDICAL CENTER | Age: 73
End: 2018-06-12
Payer: MEDICARE

## 2018-06-12 VITALS
BODY MASS INDEX: 28.64 KG/M2 | SYSTOLIC BLOOD PRESSURE: 118 MMHG | OXYGEN SATURATION: 94 % | HEIGHT: 68 IN | HEART RATE: 64 BPM | DIASTOLIC BLOOD PRESSURE: 80 MMHG | WEIGHT: 189 LBS

## 2018-06-12 DIAGNOSIS — E78.2 MIXED HYPERLIPIDEMIA: ICD-10-CM

## 2018-06-12 DIAGNOSIS — I10 ESSENTIAL HYPERTENSION: ICD-10-CM

## 2018-06-12 DIAGNOSIS — I65.21 STENOSIS OF RIGHT CAROTID ARTERY: ICD-10-CM

## 2018-06-12 DIAGNOSIS — I35.0 AORTIC VALVE STENOSIS, ETIOLOGY OF CARDIAC VALVE DISEASE UNSPECIFIED: ICD-10-CM

## 2018-06-12 DIAGNOSIS — Z95.1 S/P CABG (CORONARY ARTERY BYPASS GRAFT): ICD-10-CM

## 2018-06-12 DIAGNOSIS — I25.10 CORONARY ARTERY DISEASE INVOLVING NATIVE CORONARY ARTERY OF NATIVE HEART WITHOUT ANGINA PECTORIS: ICD-10-CM

## 2018-06-12 PROCEDURE — 99214 OFFICE O/P EST MOD 30 MIN: CPT | Performed by: INTERNAL MEDICINE

## 2018-06-12 RX ORDER — LANOLIN ALCOHOL/MO/W.PET/CERES
500 CREAM (GRAM) TOPICAL DAILY
COMMUNITY
End: 2021-05-04

## 2018-06-12 ASSESSMENT — ENCOUNTER SYMPTOMS
NERVOUS/ANXIOUS: 0
DIZZINESS: 0
MYALGIAS: 0
CARDIOVASCULAR NEGATIVE: 1
BLURRED VISION: 0
BRUISES/BLEEDS EASILY: 0
SHORTNESS OF BREATH: 0
DEPRESSION: 0
GASTROINTESTINAL NEGATIVE: 1
NEUROLOGICAL NEGATIVE: 1
COUGH: 0
RESPIRATORY NEGATIVE: 1
PSYCHIATRIC NEGATIVE: 1
CHILLS: 0
EYES NEGATIVE: 1
NAUSEA: 0
CLAUDICATION: 0
ABDOMINAL PAIN: 0
VOMITING: 0
FOCAL WEAKNESS: 0
CONSTITUTIONAL NEGATIVE: 1
PALPITATIONS: 0
FEVER: 0
DOUBLE VISION: 0
HEADACHES: 0
WEAKNESS: 0
WEIGHT LOSS: 0

## 2018-06-12 NOTE — PROGRESS NOTES
Chief Complaint   Patient presents with   • Coronary Artery Disease     Follow up       Subjective:   Joshua Becerra is a 73 y.o. male who presents today for annual follow up of coronary artery disease with prior coronary artery bypass graft surgery.    Since the patient's last visit on 06/06/17, he has been doing well clinically. He denies chest pain, shortness of breath, palpitations, nausea/vomiting or diaphoresis. He has not been walking on his treadmill due to knee arthritis however is now biking on a stationary bike daily. He has reduced his red meat intake.     Past Medical History:   Diagnosis Date   • CAD (coronary artery disease)    • Cancer (HCC)     Prostate CA- Prostatectomy done   • Carotid artery disease (HCC)    • High cholesterol    • Hyperlipidemia    • Hypertension    • Unspecified cataract    • Unspecified urinary incontinence      Past Surgical History:   Procedure Laterality Date   • CATARACT PHACO WITH IOL Left 7/2/2015    Procedure: CATARACT PHACO WITH IOL;  Surgeon: Matty Augustin M.D.;  Location: SURGERY SURGICAL [facility];  Service:    • CATARACT PHACO WITH IOL Right 6/4/2015    Procedure: CATARACT PHACO WITH IOL;  Surgeon: Matty Augustin M.D.;  Location: SURGERY SURGICAL [facility];  Service:    • MULTIPLE CORONARY ARTERY BYPASS ENDO VEIN HARVEST  12/15/2014    Performed by Gustavo Richardson M.D. at SURGERY [facility]   • PROSTATECTOMY, RADICAL RETRO  12/17/2008     Family History   Problem Relation Age of Onset   • Cancer Sister      colon   • Cancer Brother      prostate   • Heart Disease Mother    • Arthritis Mother    • Hypertension Mother    • Hyperlipidemia Mother    • Stroke Mother    • Heart Disease Father    • Hypertension Father    • Hyperlipidemia Father    • Stroke Father    • Arthritis Brother    • Hyperlipidemia Brother    • Diabetes Neg Hx      Social History     Social History   • Marital status:      Spouse name: N/A   • Number of children: N/A   •  Years of education: N/A     Occupational History   • Not on file.     Social History Main Topics   • Smoking status: Never Smoker   • Smokeless tobacco: Never Used   • Alcohol use No   • Drug use: No   • Sexual activity: No      Comment: s/p prostatectomy     Other Topics Concern   • Not on file     Social History Narrative   • No narrative on file     No Known Allergies     Medications reviewed.    Outpatient Encounter Prescriptions as of 6/12/2018   Medication Sig Dispense Refill   • Cyanocobalamin (VITAMIN B-12) 1000 MCG Tab Take 1,000 mcg by mouth every day.     • losartan (COZAAR) 100 MG Tab TAKE 1 TABLET BY MOUTH EVERY DAY 90 Tab 3   • metoprolol (LOPRESSOR) 25 MG Tab TAKE 1 TAB BY MOUTH 2 TIMES A DAY. 180 Tab 3   • atorvastatin (LIPITOR) 10 MG Tab Take 1 Tab by mouth every day. 90 Tab 3   • clopidogrel (PLAVIX) 75 MG Tab TAKE 1 TAB BY MOUTH EVERY DAY. 30 Tab 11   • Calcium 600 MG Tab Take  by mouth.     • aspirin EC (ECOTRIN) 81 MG Tablet Delayed Response Take 81 mg by mouth every day.     • Omega-3 Fatty Acids (FISH OIL) 1000 MG Cap capsule Take 1,000 mg by mouth 3 times a day, with meals.       No facility-administered encounter medications on file as of 6/12/2018.      Review of Systems   Constitutional: Negative.  Negative for chills, fever, malaise/fatigue and weight loss.   HENT: Negative.  Negative for hearing loss.    Eyes: Negative.  Negative for blurred vision and double vision.   Respiratory: Negative.  Negative for cough and shortness of breath.    Cardiovascular: Negative.  Negative for chest pain, palpitations, claudication and leg swelling.   Gastrointestinal: Negative.  Negative for abdominal pain, nausea and vomiting.   Genitourinary: Negative.  Negative for dysuria and urgency.   Musculoskeletal: Positive for joint pain. Negative for myalgias.   Skin: Negative.  Negative for itching and rash.   Neurological: Negative.  Negative for dizziness, focal weakness, weakness and headaches.  "  Endo/Heme/Allergies: Negative.  Does not bruise/bleed easily.   Psychiatric/Behavioral: Negative.  Negative for depression. The patient is not nervous/anxious.         Objective:   /80   Pulse 64   Ht 1.727 m (5' 8\")   Wt 85.7 kg (189 lb)   SpO2 94%   BMI 28.74 kg/m²     Physical Exam   Constitutional: He is oriented to person, place, and time. He appears well-developed and well-nourished.   HENT:   Head: Normocephalic and atraumatic.   Eyes: Conjunctivae are normal.   Neck: Normal range of motion. Neck supple.   Cardiovascular: Normal rate and regular rhythm.    Murmur heard.  Pulmonary/Chest: Effort normal and breath sounds normal.   Abdominal: Soft. Bowel sounds are normal.   Musculoskeletal: Normal range of motion. He exhibits no edema.   Neurological: He is alert and oriented to person, place, and time.   Skin: Skin is warm and dry.   Psychiatric: He has a normal mood and affect.     CARDIAC STUDIES/PROCEDURES:     CARDIAC CATHETERIZATION CONCLUSIONS (12/13/14)  1. Two-vessel coronary artery disease with mid left anterior descending   artery, ostial diagonal branch and proximal circumflex artery stenosis.   2. Normal left ventricular systolic function with ejection fraction of 55%.   3. Mildly elevated left ventricular end diastolic pressure.   4. Mild aortic stenosis.    CAROTID ULTRASOUND (05/17/18)  Probable distal right internal carotid occlusion, chronic. No changes from 2017.  Mild stenosis of the left internal carotid (< 50%).   Flow within both subclavian arteries appears to be within normal limits.   Antegrade flow, bilateral vertebral arteries.   Incidental left-sided thyroid cyst noted.  Follow as indicated clinically.  (study result reviewed)    CAROTID ULTRASOUND (12/13/14)  RIGHT:  Very mild smooth plaque of the common carotids & bifurcation with minimal   plaque in the proximal internal carotid artery. The ICA waveforms are   resistive with low peak systolic & end diastolic " velocities which may   suggest distal obstruction or occlusion.  LEFT:  Very mild smooth plaque of the common carotids & bifurcation extending into   the internal carotid. Velocities are consistent with < 50% stenosis of the   internal carotid artery.   Bilateral subclavian and vertebral artery waveforms are antegrade and   waveforms are normal in character and velocity.      CT OF HEAD (12/18/14)  1. Occlusion or near occlusion of the intracranial right internal carotid artery with apparent tiny branch extending to the middle cerebral artery. The right middle cerebral artery appears to be supplied primarily by the anterior communicating artery.     CT OF NECK (12/18/14)  1. Diffuse narrowing of the right internal carotid artery beginning approximately 2 cm distal to its origin could be due to diffuse atherosclerotic disease or chronic dissection.  2. Occlusion or near occlusion of the intracranial portion of the right internal carotid artery.  3. No evidence of left internal carotid artery stenosis.     ECHOCARDIOGRAM CONCLUSIONS (05/17/18)  Prior echo 5-18-17. Compared to the report of the study done - there has been no significant change.   Normal left ventricular systolic function.  Left ventricular ejection fraction is visually estimated to be 60%.  Grade II diastolic dysfunction.  Mild mitral regurgitation.  Aortic sclerosis with mild stenosis.  Vmax is 2.01 m/s. Transvalvular gradients are - Peak: 16 mmHg, Mean: 9 mmHg.  Mild tricuspid regurgitation.  Estimated right ventricular systolic pressure is 45 mmHg.  (study result reviewed)    ECHOCARDIOGRAM CONCLUSIONS (05/18/17)  Compared to the prior echo dated 7/9/15, the aortic valve still appears mildly stenotic.  1. Left ventricular ejection fraction is visually estimated to be 60%.   Grade II diastolic dysfunction.  2. There is mild aortic stenosis.  AV Peak Velocity 1.9 m/s                 AV Peak Gradient 14.6 mmHg               AV Mean Gradient 8 mmHg   3.  Estimated right ventricular systolic pressure  is 30 mmHg.     ECHOCARDIOGRAM CONCLUSIONS (07/09/15)  Normal left ventricular systolic function.  Moderate concentric left ventricular hypertrophy.  Normal diastolic function - normal mitral inflow pattern and E/E' is normal.  Mitral annular calcification.  Mild mitral regurgitation.  Mild aortic stenosis.  Mild tricuspid regurgitation.     ECHOCARDIOGRAM CONCLUSIONS (12/12/14)  Technically good study.  Normal left ventricular systolic function.  Left ventricular ejection fraction is 60% to 65%.  Grade I diastolic dysfunction - mitral inflow E/A is <1.0.  Mild mitral regurgitation.  Mild aortic stenosis.  Mild tricuspid regurgitation.     EKG performed on (12/18/14) EKG shows atrial fibrillation.  EKG performed on (12/13/14) EKG shows normal sinus rhythm.     Laboratory results of (03/13/18) were reviewed. Cholesterol profile of 141/78/51/74 noted.  Laboratory results of (03/08/17) Cholesterol profile of 158/97/58/81 noted.  Laboratory results of (06/03/16) Cholesterol profile of 106/102/45/41 noted.  Laboratory results of (12/13/14) Cholesterol profile of 139/98/46/73 noted.    Assessment:     1. Coronary artery disease involving native coronary artery of native heart without angina pectoris     2. S/P CABG (coronary artery bypass graft)     3. Aortic valve stenosis, etiology of cardiac valve disease unspecified     4. Essential hypertension     5. Mixed hyperlipidemia     6. Stenosis of right carotid artery       Medical Decision Making:  Today's Assessment / Status / Plan:     1. Coronary artery disease status post coronary bypass graft x 3 with left internal mammary artery graft to left anterior descending artery, saphenous vein graft to diagonal branch, saphenous vein graft to distal obtuse marginal branch by Dr. Richardson (12/15/14): He is clinically doing well. We will perform a myocardial perfusion imaging study in one year.  2. Post operative atrial fibrillation:  Sinus rhythm is maintained without evidence of atrial fibrillation episodes off of amiodarone.  3. Aortic stenosis: He is clinically doing well with mild aortic stenosis. We will repeat an echocardiogram in one year.  3. Hypertension: Blood pressure is well controlled.  4. Hyperlipidemia: He is doing well on statin therapy without myalgia symptoms. He will work on diet modification and exercise. We will repeat labs including fasting lipid profile in one year.  5. Carotid artery stenosis-near occlusion of ICA (managed by Dr. Peguero): Clinically stable on medical therapy and continue chronic Plavix use per Dr. Peguero.      We will follow up the patient in one year with myocardial perfusion imaging study, echocardiogram and labs.     CC Libra Rao and Vito Barone

## 2018-07-09 DIAGNOSIS — I65.23 BILATERAL CAROTID ARTERY STENOSIS: ICD-10-CM

## 2018-07-10 RX ORDER — CLOPIDOGREL BISULFATE 75 MG/1
TABLET ORAL
Qty: 90 TAB | Refills: 3 | Status: SHIPPED | OUTPATIENT
Start: 2018-07-10 | End: 2019-08-07 | Stop reason: SDUPTHER

## 2018-08-27 ENCOUNTER — TELEPHONE (OUTPATIENT)
Dept: CARDIOLOGY | Facility: MEDICAL CENTER | Age: 73
End: 2018-08-27

## 2018-08-27 ENCOUNTER — OFFICE VISIT (OUTPATIENT)
Dept: ENDOCRINOLOGY | Facility: MEDICAL CENTER | Age: 73
End: 2018-08-27
Payer: MEDICARE

## 2018-08-27 ENCOUNTER — HOSPITAL ENCOUNTER (OUTPATIENT)
Dept: LAB | Facility: MEDICAL CENTER | Age: 73
End: 2018-08-27
Attending: INTERNAL MEDICINE
Payer: MEDICARE

## 2018-08-27 VITALS
WEIGHT: 202 LBS | HEIGHT: 68 IN | SYSTOLIC BLOOD PRESSURE: 120 MMHG | DIASTOLIC BLOOD PRESSURE: 73 MMHG | BODY MASS INDEX: 30.62 KG/M2 | OXYGEN SATURATION: 95 % | HEART RATE: 75 BPM

## 2018-08-27 DIAGNOSIS — E04.1 LEFT THYROID NODULE: ICD-10-CM

## 2018-08-27 LAB — TSH SERPL DL<=0.005 MIU/L-ACNC: 1.33 UIU/ML (ref 0.38–5.33)

## 2018-08-27 PROCEDURE — 36415 COLL VENOUS BLD VENIPUNCTURE: CPT

## 2018-08-27 PROCEDURE — 99204 OFFICE O/P NEW MOD 45 MIN: CPT | Performed by: INTERNAL MEDICINE

## 2018-08-27 PROCEDURE — 84443 ASSAY THYROID STIM HORMONE: CPT

## 2018-08-27 NOTE — TELEPHONE ENCOUNTER
radiology calling for clearance   Received: Today   Message Contents   BRAVO Benito/Ray Anderson in Radiology is calling for clearance for 09/04 procedure. She can be reached at [phone].      S/w Monica from radiology. She is requesting clearance for pt to hold plavix for 5 days prior to a thyroid nodule biopsy. (Fax: [phone], ATN Monica)    Hx: CAD s/p CABG x3, post operative a-fib, mild AS, carotid artery stenosis with near occlusion to ICA on chronic plavix per Dr. Peguero.     Per last note, plavix managed by Dr. Peguero but medication filled by Dr. Garcia.     To CHERY: Okay to give clearance to hold plavix or need to ask Dr. Peguero?

## 2018-08-27 NOTE — PROGRESS NOTES
New Patient Consult Note  Primary care physician: Libra Washington M.D.    Reason for consult: Thyroid nodule    HPI:  Joshua Becerra is a 73 y.o. old patient who comes in today for evaluation of thyroid nodule.  Thyroid ultrasound in May 2018 showed 2.3 cm hypoechoic nodule in the left thyroid lobe, which is taller than it is wide and has some internal blood flow.  No prior history of thyroid nodule.  No history of neck radiation.  No family history of thyroid cancer.  No associated difficulty swallowing or breathing.  He denies any issues with unintentional weight loss, palpitations or racing heart.  He has never been on any thyroid medications.  Dr. Washington ordered FNA biopsy of this left thyroid lobe nodule in May 2018 but he has not scheduled it yet, he wanted to be seen in the endocrine clinic before scheduling the biopsy.    ROS:  Constitutional: No weight loss  Cardiac: No palpitations or racing heart  Resp: No shortness of breath  Neuro: No numbness or tinging in feet  Endo: No heat or cold intolerance, no polyuria or polydipsia  All other systems were reviewed and were negative.    Past Medical History:  Patient Active Problem List    Diagnosis Date Noted   • S/P CABG (coronary artery bypass graft) 12/15/2014     Priority: High   • CAD (coronary artery disease) 12/14/2014     Priority: High   • Aortic stenosis 02/10/2015     Priority: Medium   • Mixed hyperlipidemia 12/12/2014     Priority: Low   • Essential hypertension 12/01/2014     Priority: Low   • Arthritis of right knee 09/16/2016   • Bilateral low back pain without sciatica 09/16/2016   • Nocturnal enuresis 10/28/2015   • Other and combined forms of senile cataract 06/04/2015   • Stenosis of right carotid artery 12/14/2014   • H/O prostatectomy 09/03/2013   • History of prostate cancer 09/03/2013       Past Surgical History:  Past Surgical History:   Procedure Laterality Date   • CATARACT PHACO WITH IOL Left 7/2/2015    Procedure: CATARACT  PHACO WITH IOL;  Surgeon: Matty Augustin M.D.;  Location: SURGERY [facility];  Service:    • CATARACT PHACO WITH IOL Right 6/4/2015    Procedure: CATARACT PHACO WITH IOL;  Surgeon: Matty Augustin M.D.;  Location: SURGERY [facility];  Service:    • MULTIPLE CORONARY ARTERY BYPASS ENDO VEIN HARVEST  12/15/2014    Performed by Gustavo Richardson M.D. at SURGERY [facility]   • PROSTATECTOMY, RADICAL RETRO  12/17/2008       Allergies:  Patient has no known allergies.    Social History:  Social History     Social History   • Marital status:      Spouse name: N/A   • Number of children: N/A   • Years of education: N/A     Occupational History   • Not on file.     Social History Main Topics   • Smoking status: Never Smoker   • Smokeless tobacco: Never Used   • Alcohol use No   • Drug use: No   • Sexual activity: No      Comment: s/p prostatectomy     Other Topics Concern   • Not on file     Social History Narrative   • No narrative on file       Family History:  Family History   Problem Relation Age of Onset   • Cancer Sister         colon   • Cancer Brother         prostate   • Heart Disease Mother    • Arthritis Mother    • Hypertension Mother    • Hyperlipidemia Mother    • Stroke Mother    • Heart Disease Father    • Hypertension Father    • Hyperlipidemia Father    • Stroke Father    • Arthritis Brother    • Hyperlipidemia Brother    • Diabetes Neg Hx        Medications:    Current Outpatient Prescriptions:   •  Multiple Vitamins-Minerals (MULTIVITAMIN ADULT PO), Take  by mouth., Disp: , Rfl:   •  clopidogrel (PLAVIX) 75 MG Tab, TAKE 1 TAB BY MOUTH EVERY DAY., Disp: 90 Tab, Rfl: 3  •  Cyanocobalamin (VITAMIN B-12) 1000 MCG Tab, Take 1,000 mcg by mouth every day., Disp: , Rfl:   •  losartan (COZAAR) 100 MG Tab, TAKE 1 TABLET BY MOUTH EVERY DAY, Disp: 90 Tab, Rfl: 3  •  metoprolol (LOPRESSOR) 25 MG Tab, TAKE 1 TAB BY MOUTH 2 TIMES A DAY., Disp: 180 Tab, Rfl: 3  •  atorvastatin (LIPITOR) 10 MG  "Tab, Take 1 Tab by mouth every day., Disp: 90 Tab, Rfl: 3  •  Calcium 600 MG Tab, Take  by mouth., Disp: , Rfl:   •  Omega-3 Fatty Acids (FISH OIL) 1000 MG Cap capsule, Take 1,000 mg by mouth 3 times a day, with meals., Disp: , Rfl:   •  aspirin EC (ECOTRIN) 81 MG Tablet Delayed Response, Take 81 mg by mouth every day., Disp: , Rfl:     Physical Examination:  Vital signs: /73   Pulse 75   Ht 1.727 m (5' 8\")   Wt 91.6 kg (202 lb)   SpO2 95%   BMI 30.71 kg/m²   General: No apparent distress, cooperative  Eyes: No scleral icterus or discharge  ENMT: Normal on external inspection of nose, lips  Neck: No abnormal masses on inspection, palpable left thyroid lobe nodule  Resp: Normal effort, clear to auscultation bilaterally   CVS: Regular rate and rhythm, S1 S2 normal, no murmur   Extremities: No edema  Neuro: Alert and oriented  Skin: No rash  Psych: Normal mood and affect    Assessment and Plan:    1. Left thyroid nodule  · 2.3 cm nodule in the left thyroid lobe seen on ultrasound May 2018, with some high risk features including shape taller than wide and internal vascular flow, I advised him to call the radiology department today to schedule the biopsy  · We discussed about the risks of thyroid nodules including thyroid cancer and hyperthyroidism  · Advised to repeat thyroid ultrasound in 6-9 months from the last ultrasound which was done in May 2018  · We will check TSH as well  - TSH WITH REFLEX TO FT4; Future    Return in about 6 months (around 2/27/2019).    Thank you for allowing me to participate in the care of this patient.    Bhanu Phan M.D.  08/27/18    CC:   Libra Washington M.D.    This note was created using voice recognition software (Dragon). The accuracy of the dictation is limited by the abilities of the software. I have reviewed the note prior to signing, however some errors in grammar and context are still possible. If you have any questions related to this note please do not hesitate " to contact our office.

## 2018-08-27 NOTE — TELEPHONE ENCOUNTER
Monica from radiology notified. She will contact Dr. Peguero's office for clearance.   ----------------------------------------------------------------------------------    1600: Monica called and said that she s/w Dr. Peguero's GEORGES Nugent and she said Dr. Peguero has not seen the pt for several years and never prescribed the plavix so would not do a clearance unless pt was seen. Also, the pt told Monica that he thought he was taking plavix for his heart.      To JI/SC again: does pt need consult with Dr. Peguero for clearance?

## 2018-08-28 NOTE — TELEPHONE ENCOUNTER
Okay to hold plavix for 5 days prior. Re-start post biopsy. Recommend follow up in our office soon to discuss plan of care for carotid disease then. SC

## 2018-08-28 NOTE — TELEPHONE ENCOUNTER
Spoke to Monica and advised Plavix hold recommendation is noted in chart. Per Monica notation in Telephone Encounter is sufficient. Task sent to scheduling to make follow up appt as suggested by SC.

## 2018-09-04 ENCOUNTER — HOSPITAL ENCOUNTER (OUTPATIENT)
Dept: RADIOLOGY | Facility: MEDICAL CENTER | Age: 73
End: 2018-09-04
Attending: INTERNAL MEDICINE
Payer: MEDICARE

## 2018-09-04 DIAGNOSIS — E04.1 THYROID NODULE: ICD-10-CM

## 2018-09-04 PROCEDURE — 700101 HCHG RX REV CODE 250

## 2018-09-04 PROCEDURE — 10022 IR-NEEDLE BX-THYROID: CPT

## 2018-09-04 PROCEDURE — 88112 CYTOPATH CELL ENHANCE TECH: CPT

## 2018-09-04 RX ORDER — LIDOCAINE HYDROCHLORIDE 10 MG/ML
INJECTION, SOLUTION INFILTRATION; PERINEURAL
Status: COMPLETED
Start: 2018-09-04 | End: 2018-09-04

## 2018-09-04 RX ADMIN — LIDOCAINE HYDROCHLORIDE: 10 INJECTION, SOLUTION INFILTRATION; PERINEURAL at 09:45

## 2018-09-04 NOTE — PROGRESS NOTES
"Patient given [facility] \"Preventing the Spread of Infection\" Brochure upon being checked in.     US guided left thyroid nodule fine needle aspiration done by Dr. Chatman; NON-SEDATION  (no H&P required as this is a NON SEDATION procedure); left anterior aspect of neck access site; procedural RN: Lux; 1 jar of cytolyt obtained and sent to pathology lab; pt tolerated the procedure well; pt remained stable pre/intra/post procedure; all questions and concerns answered prior to being d/c; patient provided with appropriate education for procedure; pt d/c home.     "

## 2018-09-06 DIAGNOSIS — C73 PAPILLARY THYROID CARCINOMA (HCC): ICD-10-CM

## 2018-09-07 NOTE — PROGRESS NOTES
Referral to ENT was placed urgently for papillary thyroid carcinoma.  Patient was informed the biopsy report and we discussed to refer to ENT as well.  Patient is advised to follow with endocrinologist and ENT. Patient agreed with the plan.    Lbira Washington M.D.

## 2018-09-11 ENCOUNTER — HOSPITAL ENCOUNTER (OUTPATIENT)
Dept: LAB | Facility: MEDICAL CENTER | Age: 73
End: 2018-09-11
Attending: INTERNAL MEDICINE
Payer: MEDICARE

## 2018-09-11 DIAGNOSIS — E78.2 MIXED HYPERLIPIDEMIA: ICD-10-CM

## 2018-09-11 LAB
ALBUMIN SERPL BCP-MCNC: 4.4 G/DL (ref 3.2–4.9)
ALBUMIN/GLOB SERPL: 1.4 G/DL
ALP SERPL-CCNC: 47 U/L (ref 30–99)
ALT SERPL-CCNC: 25 U/L (ref 2–50)
ANION GAP SERPL CALC-SCNC: 11 MMOL/L (ref 0–11.9)
AST SERPL-CCNC: 24 U/L (ref 12–45)
BILIRUB SERPL-MCNC: 0.5 MG/DL (ref 0.1–1.5)
BUN SERPL-MCNC: 18 MG/DL (ref 8–22)
CALCIUM SERPL-MCNC: 9.4 MG/DL (ref 8.5–10.5)
CHLORIDE SERPL-SCNC: 105 MMOL/L (ref 96–112)
CHOLEST SERPL-MCNC: 152 MG/DL (ref 100–199)
CO2 SERPL-SCNC: 27 MMOL/L (ref 20–33)
CREAT SERPL-MCNC: 0.95 MG/DL (ref 0.5–1.4)
FASTING STATUS PATIENT QL REPORTED: NORMAL
GLOBULIN SER CALC-MCNC: 3.2 G/DL (ref 1.9–3.5)
GLUCOSE SERPL-MCNC: 97 MG/DL (ref 65–99)
HDLC SERPL-MCNC: 48 MG/DL
LDLC SERPL CALC-MCNC: 88 MG/DL
POTASSIUM SERPL-SCNC: 4.8 MMOL/L (ref 3.6–5.5)
PROT SERPL-MCNC: 7.6 G/DL (ref 6–8.2)
SODIUM SERPL-SCNC: 143 MMOL/L (ref 135–145)
TRIGL SERPL-MCNC: 82 MG/DL (ref 0–149)

## 2018-09-11 PROCEDURE — 80061 LIPID PANEL: CPT

## 2018-09-11 PROCEDURE — 80053 COMPREHEN METABOLIC PANEL: CPT

## 2018-09-11 PROCEDURE — 36415 COLL VENOUS BLD VENIPUNCTURE: CPT

## 2018-09-12 ENCOUNTER — OFFICE VISIT (OUTPATIENT)
Dept: ENDOCRINOLOGY | Facility: MEDICAL CENTER | Age: 73
End: 2018-09-12
Payer: MEDICARE

## 2018-09-12 VITALS
HEART RATE: 68 BPM | OXYGEN SATURATION: 94 % | WEIGHT: 201 LBS | SYSTOLIC BLOOD PRESSURE: 123 MMHG | DIASTOLIC BLOOD PRESSURE: 80 MMHG | HEIGHT: 68 IN | BODY MASS INDEX: 30.46 KG/M2

## 2018-09-12 DIAGNOSIS — E04.1 LEFT THYROID NODULE: ICD-10-CM

## 2018-09-12 DIAGNOSIS — C73 PAPILLARY THYROID CARCINOMA (HCC): ICD-10-CM

## 2018-09-12 DIAGNOSIS — I10 ESSENTIAL HYPERTENSION: ICD-10-CM

## 2018-09-12 PROCEDURE — 99214 OFFICE O/P EST MOD 30 MIN: CPT | Performed by: INTERNAL MEDICINE

## 2018-09-12 NOTE — PROGRESS NOTES
"Endocrinology Clinic Progress Note    CC: Thyroid nodule    HPI:  1. Left thyroid nodule  Thyroid ultrasound in May 2018 showed 2.3 cm left thyroid lobe nodule and 4 mm nodule in the right thyroid lobe.  No abnormal lymph nodes are seen.  FNA biopsy of 2.3 cm left thyroid lobe nodule earlier this month showed papillary thyroid carcinoma.    2. Papillary thyroid carcinoma (HCC)  No family history of thyroid cancer.  No neck discomfort.  No change in voice.  No unintentional weight loss.  He has appointment scheduled with ENT this afternoon to discuss total thyroidectomy.    3. Essential hypertension  Blood pressure is well controlled.  He reports compliance of medications.  He does have a history of coronary artery disease.    ROS:  Constitutional: Negative for unintentional weight loss  ENT: Negative for difficulty swallowing    PMH:  Patient Active Problem List   Diagnosis   • H/O prostatectomy   • History of prostate cancer   • Essential hypertension   • Mixed hyperlipidemia   • CAD (coronary artery disease)   • Stenosis of right carotid artery   • S/P CABG (coronary artery bypass graft)   • Aortic stenosis   • Other and combined forms of senile cataract   • Nocturnal enuresis   • Arthritis of right knee   • Bilateral low back pain without sciatica   • Papillary thyroid carcinoma (HCC)     EXAM:  Vital signs: /80   Pulse 68   Ht 1.727 m (5' 8\")   Wt 91.2 kg (201 lb)   SpO2 94%   BMI 30.56 kg/m²   General: No apparent distress, cooperative  Eyes: No scleral icterus, no discharge  Neck: Normal on external inspection  Resp: Normal effort, clear to auscultation bilaterally  CVS: Regular rate and rhythm, S1 S2 normal  Musculoskeletal: Normal gait  Extremities: No lower extremity edema  Skin: No rash on visible skin  Psych: Alert and oriented, normal mood and affect    Assessment and Plan:    1. Left thyroid nodule  · Thyroid ultrasound in May 2018 showed 2.3 cm nodule in the left thyroid lobe and 4 mm nodule " in the right thyroid lobe    2. Papillary thyroid carcinoma (HCC)  · FNA biopsy of 2.3 cm left thyroid lobe nodule earlier this month showed papillary thyroid carcinoma, he is scheduled to see ENT surgery later this afternoon to discuss about total thyroidectomy  · We discussed about overview of papillary thyroid cancer treatment including radioactive iodine ablation    3. Essential hypertension  · Blood pressure is well controlled    Return in about 4 weeks (around 10/10/2018).    Thank you for allowing me to participate in the care of this patient.    Bhanu Phna M.D.    CC:   Libra Washington M.D.    This note was created using voice recognition software (Dragon). The accuracy of the dictation is limited by the abilities of the software. I have reviewed the note prior to signing, however some errors in grammar and context are still possible. If you have any questions related to this note please do not hesitate to contact our office.

## 2018-09-13 ENCOUNTER — HOSPITAL ENCOUNTER (OUTPATIENT)
Dept: LAB | Facility: MEDICAL CENTER | Age: 73
End: 2018-09-13
Attending: SPECIALIST
Payer: MEDICARE

## 2018-09-13 ENCOUNTER — OFFICE VISIT (OUTPATIENT)
Dept: CARDIOLOGY | Facility: MEDICAL CENTER | Age: 73
End: 2018-09-13
Payer: MEDICARE

## 2018-09-13 VITALS
OXYGEN SATURATION: 94 % | WEIGHT: 199.63 LBS | DIASTOLIC BLOOD PRESSURE: 82 MMHG | SYSTOLIC BLOOD PRESSURE: 118 MMHG | HEART RATE: 64 BPM | BODY MASS INDEX: 30.25 KG/M2 | HEIGHT: 68 IN

## 2018-09-13 DIAGNOSIS — I25.10 CORONARY ARTERY DISEASE INVOLVING NATIVE CORONARY ARTERY OF NATIVE HEART WITHOUT ANGINA PECTORIS: ICD-10-CM

## 2018-09-13 DIAGNOSIS — Z01.810 PREOP CARDIOVASCULAR EXAM: ICD-10-CM

## 2018-09-13 DIAGNOSIS — Z95.1 S/P CABG (CORONARY ARTERY BYPASS GRAFT): ICD-10-CM

## 2018-09-13 DIAGNOSIS — I35.0 AORTIC VALVE STENOSIS, ETIOLOGY OF CARDIAC VALVE DISEASE UNSPECIFIED: ICD-10-CM

## 2018-09-13 DIAGNOSIS — I10 ESSENTIAL HYPERTENSION: ICD-10-CM

## 2018-09-13 DIAGNOSIS — E78.2 MIXED HYPERLIPIDEMIA: ICD-10-CM

## 2018-09-13 DIAGNOSIS — I65.21 STENOSIS OF RIGHT CAROTID ARTERY: ICD-10-CM

## 2018-09-13 LAB — EKG IMPRESSION: NORMAL

## 2018-09-13 PROCEDURE — 93000 ELECTROCARDIOGRAM COMPLETE: CPT | Performed by: INTERNAL MEDICINE

## 2018-09-13 PROCEDURE — 86800 THYROGLOBULIN ANTIBODY: CPT

## 2018-09-13 PROCEDURE — 99215 OFFICE O/P EST HI 40 MIN: CPT | Performed by: INTERNAL MEDICINE

## 2018-09-13 PROCEDURE — 36415 COLL VENOUS BLD VENIPUNCTURE: CPT

## 2018-09-13 ASSESSMENT — ENCOUNTER SYMPTOMS
FEVER: 0
PALPITATIONS: 0
ABDOMINAL PAIN: 0
BLURRED VISION: 0
ORTHOPNEA: 0
CHILLS: 0
MYALGIAS: 0
INSOMNIA: 0
DIZZINESS: 0
PND: 0
SHORTNESS OF BREATH: 0
LOSS OF CONSCIOUSNESS: 0

## 2018-09-13 NOTE — PROGRESS NOTES
No chief complaint on file.      Subjective:   Joshua Becerra is a 73 y.o. male who presents today for follow up of pre-operative cardiovascular examination with history of coronary artery bypass graft surgery.    Since the patient's last visit on 06/12/18, he has been doing well clinically. He denies chest pain, shortness of breath, palpitations, nausea/vomiting or diaphoresis. He has been diagnosed with thyroid cancer and is pending surgery.     Past Medical History:   Diagnosis Date   • Aortic stenosis    • CAD (coronary artery disease)    • Cancer (HCC)     Prostate CA- Prostatectomy done   • Carotid artery disease (HCC)    • High cholesterol    • Hyperlipidemia    • Hypertension    • Unspecified cataract    • Unspecified urinary incontinence    • Valvular heart disease      Past Surgical History:   Procedure Laterality Date   • CATARACT PHACO WITH IOL Left 7/2/2015    Procedure: CATARACT PHACO WITH IOL;  Surgeon: Matty Augustin M.D.;  Location: SURGERY [facility];  Service:    • CATARACT PHACO WITH IOL Right 6/4/2015    Procedure: CATARACT PHACO WITH IOL;  Surgeon: Matty Augustin M.D.;  Location: SURGERY [facility];  Service:    • MULTIPLE CORONARY ARTERY BYPASS ENDO VEIN HARVEST  12/15/2014    Performed by Gustavo Richardson M.D. at SURGERY [facility]   • PROSTATECTOMY, RADICAL RETRO  12/17/2008     Family History   Problem Relation Age of Onset   • Cancer Sister         colon   • Cancer Brother         prostate   • Heart Disease Mother    • Arthritis Mother    • Hypertension Mother    • Hyperlipidemia Mother    • Stroke Mother    • Heart Disease Father    • Hypertension Father    • Hyperlipidemia Father    • Stroke Father    • Arthritis Brother    • Hyperlipidemia Brother    • Diabetes Neg Hx      Social History     Social History   • Marital status:      Spouse name: N/A   • Number of children: N/A   • Years of education: N/A     Occupational History   • Not on file.  "    Social History Main Topics   • Smoking status: Never Smoker   • Smokeless tobacco: Never Used   • Alcohol use No   • Drug use: No   • Sexual activity: No      Comment: s/p prostatectomy     Other Topics Concern   • Not on file     Social History Narrative   • No narrative on file     No Known Allergies     Medications reviewed.    Outpatient Encounter Prescriptions as of 9/13/2018   Medication Sig Dispense Refill   • Multiple Vitamins-Minerals (MULTIVITAMIN ADULT PO) Take  by mouth.     • clopidogrel (PLAVIX) 75 MG Tab TAKE 1 TAB BY MOUTH EVERY DAY. 90 Tab 3   • Cyanocobalamin (VITAMIN B-12) 1000 MCG Tab Take 1,000 mcg by mouth every day.     • losartan (COZAAR) 100 MG Tab TAKE 1 TABLET BY MOUTH EVERY DAY 90 Tab 3   • metoprolol (LOPRESSOR) 25 MG Tab TAKE 1 TAB BY MOUTH 2 TIMES A DAY. 180 Tab 3   • atorvastatin (LIPITOR) 10 MG Tab Take 1 Tab by mouth every day. 90 Tab 3   • Calcium 600 MG Tab Take  by mouth.     • [DISCONTINUED] aspirin EC (ECOTRIN) 81 MG Tablet Delayed Response Take 81 mg by mouth every day.     • [DISCONTINUED] Omega-3 Fatty Acids (FISH OIL) 1000 MG Cap capsule Take 1,000 mg by mouth 3 times a day, with meals.       No facility-administered encounter medications on file as of 9/13/2018.      Review of Systems   Constitutional: Negative for chills and fever.   HENT: Negative for congestion.    Eyes: Negative for blurred vision.   Respiratory: Negative for shortness of breath.    Cardiovascular: Negative for chest pain, palpitations, orthopnea, leg swelling and PND.   Gastrointestinal: Negative for abdominal pain.   Genitourinary: Negative for dysuria.   Musculoskeletal: Negative for joint pain and myalgias.   Skin: Negative for rash.   Neurological: Negative for dizziness and loss of consciousness.   Psychiatric/Behavioral: The patient does not have insomnia.         Objective:   /82   Pulse 64   Ht 1.727 m (5' 8\")   Wt 90.5 kg (199 lb 10 oz)   SpO2 94%   BMI 30.35 kg/m²     Physical " Exam   Constitutional: He is oriented to person, place, and time. He appears well-developed and well-nourished.   HENT:   Head: Normocephalic and atraumatic.   Eyes: Pupils are equal, round, and reactive to light. Conjunctivae are normal.   Neck: Normal range of motion. Neck supple.   Cardiovascular: Normal rate and regular rhythm.    Murmur heard.  Pulmonary/Chest: Effort normal and breath sounds normal.   Abdominal: Soft. Bowel sounds are normal.   Musculoskeletal: Normal range of motion.   Neurological: He is alert and oriented to person, place, and time.   Skin: Skin is warm and dry.   Psychiatric: He has a normal mood and affect.      CARDIAC STUDIES/PROCEDURES:     CARDIAC CATHETERIZATION CONCLUSIONS (12/13/14)  1. Two-vessel coronary artery disease with mid left anterior descending   artery, ostial diagonal branch and proximal circumflex artery stenosis.   2. Normal left ventricular systolic function with ejection fraction of 55%.   3. Mildly elevated left ventricular end diastolic pressure.   4. Mild aortic stenosis.     CAROTID ULTRASOUND (05/17/18)  Probable distal right internal carotid occlusion, chronic. No changes from 2017.  Mild stenosis of the left internal carotid (< 50%).   Flow within both subclavian arteries appears to be within normal limits.   Antegrade flow, bilateral vertebral arteries.   Incidental left-sided thyroid cyst noted.  Follow as indicated clinically.     CAROTID ULTRASOUND (12/13/14)  RIGHT:  Very mild smooth plaque of the common carotids & bifurcation with minimal   plaque in the proximal internal carotid artery. The ICA waveforms are   resistive with low peak systolic & end diastolic velocities which may   suggest distal obstruction or occlusion.  LEFT:  Very mild smooth plaque of the common carotids & bifurcation extending into   the internal carotid. Velocities are consistent with < 50% stenosis of the   internal carotid artery.   Bilateral subclavian and vertebral artery  waveforms are antegrade and   waveforms are normal in character and velocity.      CT OF HEAD (12/18/14)  1. Occlusion or near occlusion of the intracranial right internal carotid artery with apparent tiny branch extending to the middle cerebral artery. The right middle cerebral artery appears to be supplied primarily by the anterior communicating artery.     CT OF NECK (12/18/14)  1. Diffuse narrowing of the right internal carotid artery beginning approximately 2 cm distal to its origin could be due to diffuse atherosclerotic disease or chronic dissection.  2. Occlusion or near occlusion of the intracranial portion of the right internal carotid artery.  3. No evidence of left internal carotid artery stenosis.    ECHOCARDIOGRAM CONCLUSIONS (05/17/18)  Prior echo 5-18-17. Compared to the report of the study done - there has been no significant change.   Normal left ventricular systolic function.  Left ventricular ejection fraction is visually estimated to be 60%.  Grade II diastolic dysfunction.  Mild mitral regurgitation.  Aortic sclerosis with mild stenosis.  Vmax is 2.01 m/s. Transvalvular gradients are - Peak: 16 mmHg,  Mean: 9 mmHg.  Mild tricuspid regurgitation.  Estimated right ventricular systolic pressure is 45 mmHg.  (study result reviewed)     ECHOCARDIOGRAM CONCLUSIONS (05/17/18)  Prior echo 5-18-17. Compared to the report of the study done - there has been no significant change.   Normal left ventricular systolic function.  Left ventricular ejection fraction is visually estimated to be 60%.  Grade II diastolic dysfunction.  Mild mitral regurgitation.  Aortic sclerosis with mild stenosis.  Vmax is 2.01 m/s. Transvalvular gradients are - Peak: 16 mmHg, Mean: 9 mmHg.  Mild tricuspid regurgitation.  Estimated right ventricular systolic pressure is 45 mmHg.     ECHOCARDIOGRAM CONCLUSIONS (05/18/17)  Compared to the prior echo dated 7/9/15, the aortic valve still appears mildly stenotic.  1. Left ventricular  ejection fraction is visually estimated to be 60%.   Grade II diastolic dysfunction.  2. There is mild aortic stenosis.  AV Peak Velocity 1.9 m/s                 AV Peak Gradient 14.6 mmHg               AV Mean Gradient 8 mmHg   3. Estimated right ventricular systolic pressure  is 30 mmHg.     ECHOCARDIOGRAM CONCLUSIONS (07/09/15)  Normal left ventricular systolic function.  Moderate concentric left ventricular hypertrophy.  Normal diastolic function - normal mitral inflow pattern and E/E' is normal.  Mitral annular calcification.  Mild mitral regurgitation.  Mild aortic stenosis.  Mild tricuspid regurgitation.     ECHOCARDIOGRAM CONCLUSIONS (12/12/14)  Technically good study.  Normal left ventricular systolic function.  Left ventricular ejection fraction is 60% to 65%.  Grade I diastolic dysfunction - mitral inflow E/A is <1.0.  Mild mitral regurgitation.  Mild aortic stenosis.  Mild tricuspid regurgitation.     EKG performed on (12/18/14) EKG shows atrial fibrillation.  EKG performed on (12/13/14) EKG shows normal sinus rhythm.     Laboratory results of (09/11/18) were reviewed. Cholesterol profile of 152/82/48/88 noted.  Laboratory results of (03/13/18) Cholesterol profile of 141/78/51/74 noted.  Laboratory results of (03/08/17) Cholesterol profile of 158/97/58/81 noted.  Laboratory results of (06/03/16) Cholesterol profile of 106/102/45/41 noted.  Laboratory results of (12/13/14) Cholesterol profile of 139/98/46/73 noted.    Assessment:     1. Preop cardiovascular exam  EKG   2. Coronary artery disease involving native coronary artery of native heart without angina pectoris  EKG   3. S/P CABG (coronary artery bypass graft)  EKG   4. Aortic valve stenosis, etiology of cardiac valve disease unspecified  EKG   5. Essential hypertension     6. Mixed hyperlipidemia     7. Stenosis of right carotid artery         Medical Decision Making:  Today's Assessment / Status / Plan:     1. Pre-operative cardiovascular  examination and coronary artery disease status post coronary bypass graft x 3 with left internal mammary artery graft to left anterior descending artery, saphenous vein graft to diagonal branch, saphenous vein graft to distal obtuse marginal branch by Dr. Richardson (12/15/14): He is clinically doing well. He has been diagnosed with thyroid cancer and is pending surgery. We will perform a myocardial perfusion imaging study.  2. Post operative atrial fibrillation: Sinus rhythm is maintained without evidence of atrial fibrillation episodes off of amiodarone.  3. Aortic stenosis: He is clinically doing well with mild aortic stenosis. We plan to repeat an echocardiogram in one year.  3. Hypertension: Blood pressure is well controlled.  4. Hyperlipidemia: He is doing well on statin therapy without myalgia symptoms. He will work on diet modification and exercise. We will repeat labs including fasting lipid profile in one year.  5. Carotid artery stenosis-near occlusion of ICA (managed by Dr. Peguero): Clinically stable on medical therapy and continue chronic Plavix use per Dr. Peguero.      We will follow up the patient in one year with myocardial perfusion imaging study.     CC Libra Rao and Vito Barone

## 2018-09-13 NOTE — LETTER
[facility]   [address] [place] [address]  SINDY Murphy [address]  Phone: [phone]  Fax: [phone]              Joshua Becerra  1945    Encounter Date: 9/13/2018    Nahun Garcia M.D.          PROGRESS NOTE:  No chief complaint on file.      Subjective:   Joshua Becerra is a 73 y.o. male who presents today for follow up of pre-operative cardiovascular examination with history of coronary artery bypass graft surgery.    Since the patient's last visit on 06/12/18, he has been doing well clinically. He denies chest pain, shortness of breath, palpitations, nausea/vomiting or diaphoresis. He has not been walking on his treadmill due to knee arthritis however is now biking on a stationary bike daily. He has reduced his red meat intake.      Past Medical History:   Diagnosis Date   • CAD (coronary artery disease)    • Cancer (HCC)     Prostate CA- Prostatectomy done   • Carotid artery disease (HCC)    • High cholesterol    • Hyperlipidemia    • Hypertension    • Unspecified cataract    • Unspecified urinary incontinence      Past Surgical History:   Procedure Laterality Date   • CATARACT PHACO WITH IOL Left 7/2/2015    Procedure: CATARACT PHACO WITH IOL;  Surgeon: Matty Augustin M.D.;  Location: SURGERY SUR[facility];  Service:    • CATARACT PHACO WITH IOL Right 6/4/2015    Procedure: CATARACT PHACO WITH IOL;  Surgeon: Matty Augustin M.D.;  Location: SURGERY SURG[facility];  Service:    • MULTIPLE CORONARY ARTERY BYPASS ENDO VEIN HARVEST  12/15/2014    Performed by Gustavo Richardson M.D. at SURGERY [facility] ORS   • PROSTATECTOMY, RADICAL RETRO  12/17/2008     Family History   Problem Relation Age of Onset   • Cancer Sister         colon   • Cancer Brother         prostate   • Heart Disease Mother    • Arthritis Mother    • Hypertension Mother    • Hyperlipidemia Mother    • Stroke Mother    • Heart Disease Father    • Hypertension Father      • Hyperlipidemia Father    • Stroke Father    • Arthritis Brother    • Hyperlipidemia Brother    • Diabetes Neg Hx      Social History     Social History   • Marital status:      Spouse name: N/A   • Number of children: N/A   • Years of education: N/A     Occupational History   • Not on file.     Social History Main Topics   • Smoking status: Never Smoker   • Smokeless tobacco: Never Used   • Alcohol use No   • Drug use: No   • Sexual activity: No      Comment: s/p prostatectomy     Other Topics Concern   • Not on file     Social History Narrative   • No narrative on file     No Known Allergies     Medications reviewed.    Outpatient Encounter Prescriptions as of 9/13/2018   Medication Sig Dispense Refill   • Multiple Vitamins-Minerals (MULTIVITAMIN ADULT PO) Take  by mouth.     • clopidogrel (PLAVIX) 75 MG Tab TAKE 1 TAB BY MOUTH EVERY DAY. 90 Tab 3   • Cyanocobalamin (VITAMIN B-12) 1000 MCG Tab Take 1,000 mcg by mouth every day.     • losartan (COZAAR) 100 MG Tab TAKE 1 TABLET BY MOUTH EVERY DAY 90 Tab 3   • metoprolol (LOPRESSOR) 25 MG Tab TAKE 1 TAB BY MOUTH 2 TIMES A DAY. 180 Tab 3   • atorvastatin (LIPITOR) 10 MG Tab Take 1 Tab by mouth every day. 90 Tab 3   • Calcium 600 MG Tab Take  by mouth.     • aspirin EC (ECOTRIN) 81 MG Tablet Delayed Response Take 81 mg by mouth every day.     • Omega-3 Fatty Acids (FISH OIL) 1000 MG Cap capsule Take 1,000 mg by mouth 3 times a day, with meals.       No facility-administered encounter medications on file as of 9/13/2018.      Review of Systems   Constitutional: Negative for chills and fever.   HENT: Negative for congestion.    Eyes: Negative for blurred vision.   Respiratory: Negative for shortness of breath.    Cardiovascular: Negative for chest pain, palpitations, orthopnea, leg swelling and PND.   Gastrointestinal: Negative for abdominal pain.   Genitourinary: Negative for dysuria.   Musculoskeletal: Negative for joint pain and myalgias.   Skin: Negative  for rash.   Neurological: Negative for dizziness and loss of consciousness.   Psychiatric/Behavioral: The patient does not have insomnia.         Objective:   There were no vitals taken for this visit.    Physical Exam   Constitutional: He is oriented to person, place, and time. He appears well-developed and well-nourished.   HENT:   Head: Normocephalic and atraumatic.   Eyes: Pupils are equal, round, and reactive to light. Conjunctivae are normal.   Neck: Normal range of motion. Neck supple.   Cardiovascular: Normal rate and regular rhythm.    Pulmonary/Chest: Effort normal and breath sounds normal.   Abdominal: Soft. Bowel sounds are normal.   Musculoskeletal: Normal range of motion.   Neurological: He is alert and oriented to person, place, and time.   Skin: Skin is warm and dry.   Psychiatric: He has a normal mood and affect.      CARDIAC STUDIES/PROCEDURES:     CARDIAC CATHETERIZATION CONCLUSIONS (12/13/14)  1. Two-vessel coronary artery disease with mid left anterior descending   artery, ostial diagonal branch and proximal circumflex artery stenosis.   2. Normal left ventricular systolic function with ejection fraction of 55%.   3. Mildly elevated left ventricular end diastolic pressure.   4. Mild aortic stenosis.     CAROTID ULTRASOUND (05/17/18)  Probable distal right internal carotid occlusion, chronic. No changes from 2017.  Mild stenosis of the left internal carotid (< 50%).   Flow within both subclavian arteries appears to be within normal limits.   Antegrade flow, bilateral vertebral arteries.   Incidental left-sided thyroid cyst noted.  Follow as indicated clinically.     CAROTID ULTRASOUND (12/13/14)  RIGHT:  Very mild smooth plaque of the common carotids & bifurcation with minimal   plaque in the proximal internal carotid artery. The ICA waveforms are   resistive with low peak systolic & end diastolic velocities which may   suggest distal obstruction or occlusion.  LEFT:  Very mild smooth plaque of  the common carotids & bifurcation extending into   the internal carotid. Velocities are consistent with < 50% stenosis of the   internal carotid artery.   Bilateral subclavian and vertebral artery waveforms are antegrade and   waveforms are normal in character and velocity.      CT OF HEAD (12/18/14)  1. Occlusion or near occlusion of the intracranial right internal carotid artery with apparent tiny branch extending to the middle cerebral artery. The right middle cerebral artery appears to be supplied primarily by the anterior communicating artery.     CT OF NECK (12/18/14)  1. Diffuse narrowing of the right internal carotid artery beginning approximately 2 cm distal to its origin could be due to diffuse atherosclerotic disease or chronic dissection.  2. Occlusion or near occlusion of the intracranial portion of the right internal carotid artery.  3. No evidence of left internal carotid artery stenosis.    ECHOCARDIOGRAM CONCLUSIONS (05/17/18)  Prior echo 5-18-17. Compared to the report of the study done - there   has been no significant change.   Normal left ventricular systolic function.  Left ventricular ejection fraction is visually estimated to be 60%.  Grade II diastolic dysfunction.  Mild mitral regurgitation.  Aortic sclerosis with mild stenosis.  Vmax is 2.01 m/s. Transvalvular gradients are - Peak: 16 mmHg,  Mean: 9 mmHg.  Mild tricuspid regurgitation.  Estimated right ventricular systolic pressure is 45 mmHg.  (study result reviewed)     ECHOCARDIOGRAM CONCLUSIONS (05/17/18)  Prior echo 5-18-17. Compared to the report of the study done - there has been no significant change.   Normal left ventricular systolic function.  Left ventricular ejection fraction is visually estimated to be 60%.  Grade II diastolic dysfunction.  Mild mitral regurgitation.  Aortic sclerosis with mild stenosis.  Vmax is 2.01 m/s. Transvalvular gradients are - Peak: 16 mmHg, Mean: 9 mmHg.  Mild tricuspid regurgitation.  Estimated  right ventricular systolic pressure is 45 mmHg.     ECHOCARDIOGRAM CONCLUSIONS (05/18/17)  Compared to the prior echo dated 7/9/15, the aortic valve still appears mildly stenotic.  1. Left ventricular ejection fraction is visually estimated to be 60%.   Grade II diastolic dysfunction.  2. There is mild aortic stenosis.  AV Peak Velocity 1.9 m/s                 AV Peak Gradient 14.6 mmHg               AV Mean Gradient 8 mmHg   3. Estimated right ventricular systolic pressure  is 30 mmHg.     ECHOCARDIOGRAM CONCLUSIONS (07/09/15)  Normal left ventricular systolic function.  Moderate concentric left ventricular hypertrophy.  Normal diastolic function - normal mitral inflow pattern and E/E' is normal.  Mitral annular calcification.  Mild mitral regurgitation.  Mild aortic stenosis.  Mild tricuspid regurgitation.     ECHOCARDIOGRAM CONCLUSIONS (12/12/14)  Technically good study.  Normal left ventricular systolic function.  Left ventricular ejection fraction is 60% to 65%.  Grade I diastolic dysfunction - mitral inflow E/A is <1.0.  Mild mitral regurgitation.  Mild aortic stenosis.  Mild tricuspid regurgitation.     EKG performed on (12/18/14) EKG shows atrial fibrillation.  EKG performed on (12/13/14) EKG shows normal sinus rhythm.     Laboratory results of (09/11/18) were reviewed. Cholesterol profile of 152/82/48/88 noted.  Laboratory results of (03/13/18) Cholesterol profile of 141/78/51/74 noted.  Laboratory results of (03/08/17) Cholesterol profile of 158/97/58/81 noted.  Laboratory results of (06/03/16) Cholesterol profile of 106/102/45/41 noted.  Laboratory results of (12/13/14) Cholesterol profile of 139/98/46/73 noted.    Assessment:     1. Preop cardiovascular exam     2. Coronary artery disease involving native coronary artery of native heart without angina pectoris     3. S/P CABG (coronary artery bypass graft)     4. Aortic valve stenosis, etiology of cardiac valve disease unspecified         Medical  Decision Making:  Today's Assessment / Status / Plan:     1. Coronary artery disease status post coronary bypass graft x 3 with left internal mammary artery graft to left anterior descending artery, saphenous vein graft to diagonal branch, saphenous vein graft to distal obtuse marginal branch by Dr. Richardson (12/15/14): He is clinically doing well. We will perform a myocardial perfusion imaging study in one year.  2. Post operative atrial fibrillation: Sinus rhythm is maintained without evidence of atrial fibrillation episodes off of amiodarone.  3. Aortic stenosis: He is clinically doing well with mild aortic stenosis. We will repeat an echocardiogram in one year.  3. Hypertension: Blood pressure is well controlled.  4. Hyperlipidemia: He is doing well on statin therapy without myalgia symptoms. He will work on diet modification and exercise. We will repeat labs including fasting lipid profile in one year.  5. Carotid artery stenosis-near occlusion of ICA (managed by Dr. Peguero): Clinically stable on medical therapy and continue chronic Plavix use per Dr. Peguero.      We will follow up the patient in one year with myocardial perfusion imaging study, echocardiogram and labs.     CC Libra Rao and Vito Barone           No Recipients

## 2018-09-15 LAB — THYROGLOB AB SERPL-ACNC: <0.9 IU/ML (ref 0–4)

## 2018-09-17 ENCOUNTER — HOSPITAL ENCOUNTER (OUTPATIENT)
Dept: RADIOLOGY | Facility: MEDICAL CENTER | Age: 73
End: 2018-09-17
Attending: INTERNAL MEDICINE
Payer: MEDICARE

## 2018-09-17 DIAGNOSIS — Z95.1 S/P CABG (CORONARY ARTERY BYPASS GRAFT): ICD-10-CM

## 2018-09-17 DIAGNOSIS — I25.10 CORONARY ARTERY DISEASE INVOLVING NATIVE CORONARY ARTERY OF NATIVE HEART WITHOUT ANGINA PECTORIS: ICD-10-CM

## 2018-09-17 PROCEDURE — 700111 HCHG RX REV CODE 636 W/ 250 OVERRIDE (IP)

## 2018-09-17 PROCEDURE — A9502 TC99M TETROFOSMIN: HCPCS

## 2018-09-17 RX ORDER — REGADENOSON 0.08 MG/ML
INJECTION, SOLUTION INTRAVENOUS
Status: COMPLETED
Start: 2018-09-17 | End: 2018-09-17

## 2018-09-17 RX ADMIN — REGADENOSON 0.4 MG: 0.08 INJECTION, SOLUTION INTRAVENOUS at 09:20

## 2018-09-20 ENCOUNTER — TELEPHONE (OUTPATIENT)
Dept: CARDIOLOGY | Facility: MEDICAL CENTER | Age: 73
End: 2018-09-20

## 2018-09-20 NOTE — TELEPHONE ENCOUNTER
NM-CARDIAC STRESS TEST   Order: [id number]   Status:  Final result   Visible to patient:  Yes (Natasha) Dx:  S/P CABG (coronary artery bypass claudette...   Notes recorded by YANDY FrostRJustinNJustin on 9/20/2018 at 9:35 AM PDT  Yes nuclear negative for ischemia. Clear for surgery. SC  ------    Notes recorded by KAREN Frost on 9/17/2018 at 11:52 AM PDT  Normal nuclear imaging. FU as planned. SC     Called pt and informed him of clearance for surgery. Pt states that Dr. Adams with [facility] is doing the surgery and told him to stop his plavix 1 week prior to the procedure. Recommended that the pt contact Dr. Peguero's office for approval to hold plavix with his hx of carotid artery stenosis and near occlusion of ICA. Pt agreed to contact them.     Clearance letter faxed to Dr. nam at [phone]

## 2018-09-21 ENCOUNTER — HOSPITAL ENCOUNTER (OUTPATIENT)
Dept: RADIOLOGY | Facility: MEDICAL CENTER | Age: 73
End: 2018-09-21
Attending: SPECIALIST
Payer: MEDICARE

## 2018-09-21 DIAGNOSIS — C73 MALIGNANT NEOPLASM OF THYROID GLAND (HCC): ICD-10-CM

## 2018-09-21 PROCEDURE — 70543 MRI ORBT/FAC/NCK W/O &W/DYE: CPT

## 2018-09-21 PROCEDURE — 700117 HCHG RX CONTRAST REV CODE 255: Performed by: SPECIALIST

## 2018-09-21 PROCEDURE — A9585 GADOBUTROL INJECTION: HCPCS | Performed by: SPECIALIST

## 2018-09-21 RX ORDER — GADOBUTROL 604.72 MG/ML
10 INJECTION INTRAVENOUS ONCE
Status: COMPLETED | OUTPATIENT
Start: 2018-09-21 | End: 2018-09-21

## 2018-09-21 RX ADMIN — GADOBUTROL 10 ML: 604.72 INJECTION INTRAVENOUS at 10:12

## 2018-09-24 ENCOUNTER — OFFICE VISIT (OUTPATIENT)
Dept: MEDICAL GROUP | Age: 73
End: 2018-09-24
Payer: MEDICARE

## 2018-09-24 VITALS
SYSTOLIC BLOOD PRESSURE: 130 MMHG | HEART RATE: 60 BPM | BODY MASS INDEX: 30.65 KG/M2 | DIASTOLIC BLOOD PRESSURE: 68 MMHG | HEIGHT: 68 IN | OXYGEN SATURATION: 100 % | WEIGHT: 202.2 LBS | TEMPERATURE: 97.6 F

## 2018-09-24 DIAGNOSIS — I10 ESSENTIAL HYPERTENSION: ICD-10-CM

## 2018-09-24 DIAGNOSIS — E78.2 MIXED HYPERLIPIDEMIA: ICD-10-CM

## 2018-09-24 DIAGNOSIS — C73 PAPILLARY THYROID CARCINOMA (HCC): ICD-10-CM

## 2018-09-24 DIAGNOSIS — E66.9 OBESITY (BMI 30.0-34.9): ICD-10-CM

## 2018-09-24 DIAGNOSIS — Z23 NEED FOR VACCINATION: ICD-10-CM

## 2018-09-24 PROCEDURE — G0008 ADMIN INFLUENZA VIRUS VAC: HCPCS | Performed by: INTERNAL MEDICINE

## 2018-09-24 PROCEDURE — 90662 IIV NO PRSV INCREASED AG IM: CPT | Performed by: INTERNAL MEDICINE

## 2018-09-24 PROCEDURE — 99214 OFFICE O/P EST MOD 30 MIN: CPT | Mod: 25 | Performed by: INTERNAL MEDICINE

## 2018-09-24 NOTE — ASSESSMENT & PLAN NOTE
He is taking metoprolol 25 mg twice daily and losartan 100 mg daily.  His blood pressure is stable and well controlled.  Patient denies side effects from taking current blood pressure medications.  He has normal CMP on 9/11/18.

## 2018-09-24 NOTE — ASSESSMENT & PLAN NOTE
Patient already follow with ENT for papillary thyroid carcinoma.  He has MRI neck on 9/21/18.  Patient will follow with ENT with MRI report to discuss surgical treatment for thyroidectomy.  Patient already follow with endocrinologist who will see him again after surgery.  Patient already had cardiologist follow-up for preoperative assessment and clearance.  Cardiologist cleared him for surgery.  Patient will follow with Dr. Hogan, vascular surgeon to discuss whether he is safe to stop Plavix 1 week before surgery.

## 2018-09-24 NOTE — ASSESSMENT & PLAN NOTE
Patient is taking atorvastatin 10 mg every evening and omega-3 daily.  Patient denies taking atorvastatin.  His cholesterol level is stable with current regimens.  Patient does not have regular exercise yet.  He stated that he is going to do walking exercise again.  I review his blood test with him in clinic today.    Results for JESENIA RODRÍGUEZ (MRN [id number]) as of 9/24/2018 08:57   Ref. Range 9/11/2018 08:32   Cholesterol,Tot Latest Ref Range: 100 - 199 mg/dL 152   Triglycerides Latest Ref Range: 0 - 149 mg/dL 82   HDL Latest Ref Range: >=40 mg/dL 48   LDL Latest Ref Range: <100 mg/dL 88

## 2018-09-24 NOTE — PROGRESS NOTES
Subjective:   Joshua Becerra is a 73 y.o. male here today for evaluation and management of:      Papillary thyroid carcinoma (HCC)  Patient already follow with ENT for papillary thyroid carcinoma.  He has MRI neck on 9/21/18.  Patient will follow with ENT with MRI report to discuss surgical treatment for thyroidectomy.  Patient already follow with endocrinologist who will see him again after surgery.  Patient already had cardiologist follow-up for preoperative assessment and clearance.  Cardiologist cleared him for surgery.  Patient will follow with Dr. Hogan, vascular surgeon to discuss whether he is safe to stop Plavix 1 week before surgery.    Mixed hyperlipidemia  Patient is taking atorvastatin 10 mg every evening and omega-3 daily.  Patient denies taking atorvastatin.  His cholesterol level is stable with current regimens.  Patient does not have regular exercise yet.  He stated that he is going to do walking exercise again.  I review his blood test with him in clinic today.    Results for JOSHUA BECERRA (MRN [id number]) as of 9/24/2018 08:57   Ref. Range 9/11/2018 08:32   Cholesterol,Tot Latest Ref Range: 100 - 199 mg/dL 152   Triglycerides Latest Ref Range: 0 - 149 mg/dL 82   HDL Latest Ref Range: >=40 mg/dL 48   LDL Latest Ref Range: <100 mg/dL 88       Essential hypertension  He is taking metoprolol 25 mg twice daily and losartan 100 mg daily.  His blood pressure is stable and well controlled.  Patient denies side effects from taking current blood pressure medications.  He has normal CMP on 9/11/18.         Current medicines (including changes today)  Current Outpatient Prescriptions   Medication Sig Dispense Refill   • Multiple Vitamins-Minerals (MULTIVITAMIN ADULT PO) Take  by mouth.     • clopidogrel (PLAVIX) 75 MG Tab TAKE 1 TAB BY MOUTH EVERY DAY. 90 Tab 3   • Cyanocobalamin (VITAMIN B-12) 1000 MCG Tab Take 1,000 mcg by mouth every day.     • losartan (COZAAR) 100 MG Tab TAKE 1  "TABLET BY MOUTH EVERY DAY 90 Tab 3   • metoprolol (LOPRESSOR) 25 MG Tab TAKE 1 TAB BY MOUTH 2 TIMES A DAY. 180 Tab 3   • atorvastatin (LIPITOR) 10 MG Tab Take 1 Tab by mouth every day. 90 Tab 3   • Calcium 600 MG Tab Take  by mouth.       No current facility-administered medications for this visit.      He  has a past medical history of Aortic stenosis; CAD (coronary artery disease); Cancer (HCC); Carotid artery disease (HCC); High cholesterol; Hyperlipidemia; Hypertension; Unspecified cataract; Unspecified urinary incontinence; and Valvular heart disease.    ROS   No chest pain, no shortness of breath, no abdominal pain       Objective:     Blood pressure 130/68, pulse 60, temperature 36.4 °C (97.6 °F), temperature source Temporal, height 1.727 m (5' 8\"), weight 91.7 kg (202 lb 3.2 oz), SpO2 100 %. Body mass index is 30.74 kg/m².   Physical Exam:  General: Alert, oriented and no acute distress.  Eye contact is good, speech goal directed, affect calm  HEENT: conjunctiva non-injected, sclera non-icteric.  Oral mucous membranes pink and moist with no lesions.  Pinna normal.   Lungs: Normal respiratory effort, clear to auscultation bilaterally with good excursion.  CV: regular rate and rhythm. No murmurs.  Abdomen: soft, non distended, nontender, Bowel sound normal.  Ext: no edema, color normal, vascularity normal, temperature normal      Assessment and Plan:   The following treatment plan was discussed     1. Papillary thyroid carcinoma (HCC)  - MRI neck was done on 9/21/18 showed papillary thyroid carcinoma without cervical lymphadenopathy.  Patient will follow with surgeon for thyroidectomy.  Cardiologist already cleared patient for surgery.  Patient will follow up with endocrinologist after surgery.  Patient will call to follow-up with Dr. Bernal, vascular surgeon to discuss to hold off Plavix before surgery.  - TSH; Future  - FREE THYROXINE; Future    2. Mixed hyperlipidemia  - Well-controlled. Continue current " regimens, atorvastatin 10 mg every evening. Recheck lab 1-2 weeks before next follow up visit.  - Reviewed the risks and benefits of treatment and potential side effects of medication.  - Advised to eat low fat, low carbohydrate and high fiber diet as well as do cardio physical exercise regularly.  - COMP METABOLIC PANEL; Future  - LIPID PROFILE; Future    3. Essential hypertension  - Well-controlled. Continue current regimens, losartan 100 mg daily and metoprolol 25 mg twice daily. Recheck lab 1-2 weeks before next follow up visit.  - Reviewed the risks and benefits as well as potential side effects of medications with patient.  - Discussed to eat low-sodium diet and encouraged to do regular physical exercise.  - Recommend to monitor blood pressure and heart rate at home.  - CBC WITH DIFFERENTIAL; Future  - COMP METABOLIC PANEL; Future    4. Need for vaccination  - Influenza vaccine was given today after reviewing risks and benefits as well as side effects of vaccine.  - INFLUENZA VACCINE, HIGH DOSE (65+ ONLY)    5. Obesity (BMI 30.0-34.9)  - Counseled for healthy diet and regular physical exercise to lose weight.  - Patient identified as having weight management issue.  Appropriate orders and counseling given.    6. Health Maintenance   - Patient would like to postpone colonoscopy after thyroidectomy.  He was scheduled with GI after recovery from thyroidectomy        Followup: Return in about 3 months (around 12/24/2018), or if symptoms worsen or fail to improve, for Hypertension, Hyperlipidemia, Hypothyroid, Lab review.      Please note that this dictation was created using voice recognition software. I have made every reasonable attempt to correct obvious errors, but I expect that there may have unintended errors in text, spelling, punctuation, or grammar that I did not discover.

## 2018-10-05 ENCOUNTER — TELEPHONE (OUTPATIENT)
Dept: CARDIOLOGY | Facility: MEDICAL CENTER | Age: 73
End: 2018-10-05

## 2018-10-06 NOTE — TELEPHONE ENCOUNTER
I called Ed back and resent the clearance (in letters)  to Dr. Adams as patient said surgeon did not get it.  Ed will call Dr. Peguero's office on Monday re: the Plavix.

## 2018-10-06 NOTE — TELEPHONE ENCOUNTER
----- Message from Britney Lancaster sent at 10/5/2018  2:55 PM PDT -----  Regarding: Surgical clearance  CHERY/Raven    Patient wants to find out if he cleared for his surgery on 10/23 with his ENT doctor and wants a call back at [phone].

## 2018-10-13 NOTE — TELEPHONE ENCOUNTER
"plavix hold clearance request   Received: Today   Message Contents   Radha Mills R.N.   Phone Number: [phone]             CHERY/ray Houser (\"Meeah\") with [facility] calling about plavix hold prior to procedure on 10/23.  Please fax clearance for plavix hold to Corrina [phone].  If questions, Corrina is at      (Ray- please refer to your notes of 9/20 and lizbet's notes of 10/5.)        [facility] has been told twice already that plavix clearance needs to come from Dr. Vito Peguero. Will send another fax to their office stating this.     As stated previously, Dr. Peguero needs to clear as he is treating patients carotid and ICA stenosis and near occlusion.     Faxed this information explaining this in detail along with last office visit note to Allison MOORE [phone].       "

## 2018-10-18 DIAGNOSIS — Z01.812 PRE-OPERATIVE LABORATORY EXAMINATION: ICD-10-CM

## 2018-10-18 LAB
ANION GAP SERPL CALC-SCNC: 8 MMOL/L (ref 0–11.9)
BUN SERPL-MCNC: 21 MG/DL (ref 8–22)
CALCIUM SERPL-MCNC: 10.5 MG/DL (ref 8.5–10.5)
CHLORIDE SERPL-SCNC: 103 MMOL/L (ref 96–112)
CO2 SERPL-SCNC: 27 MMOL/L (ref 20–33)
CREAT SERPL-MCNC: 1.02 MG/DL (ref 0.5–1.4)
ERYTHROCYTE [DISTWIDTH] IN BLOOD BY AUTOMATED COUNT: 43 FL (ref 35.9–50)
GLUCOSE SERPL-MCNC: 95 MG/DL (ref 65–99)
HCT VFR BLD AUTO: 47.4 % (ref 42–52)
HGB BLD-MCNC: 15.9 G/DL (ref 14–18)
MCH RBC QN AUTO: 32.7 PG (ref 27–33)
MCHC RBC AUTO-ENTMCNC: 33.5 G/DL (ref 33.7–35.3)
MCV RBC AUTO: 97.5 FL (ref 81.4–97.8)
PLATELET # BLD AUTO: 252 K/UL (ref 164–446)
PMV BLD AUTO: 10.7 FL (ref 9–12.9)
POTASSIUM SERPL-SCNC: 4.6 MMOL/L (ref 3.6–5.5)
RBC # BLD AUTO: 4.86 M/UL (ref 4.7–6.1)
SODIUM SERPL-SCNC: 138 MMOL/L (ref 135–145)
WBC # BLD AUTO: 6.4 K/UL (ref 4.8–10.8)

## 2018-10-18 PROCEDURE — 85027 COMPLETE CBC AUTOMATED: CPT

## 2018-10-18 PROCEDURE — 36415 COLL VENOUS BLD VENIPUNCTURE: CPT

## 2018-10-18 PROCEDURE — 80048 BASIC METABOLIC PNL TOTAL CA: CPT

## 2018-10-23 ENCOUNTER — HOSPITAL ENCOUNTER (OUTPATIENT)
Facility: MEDICAL CENTER | Age: 73
End: 2018-10-24
Attending: SPECIALIST | Admitting: SPECIALIST
Payer: MEDICARE

## 2018-10-23 LAB
CALCIUM SERPL-MCNC: 9.6 MG/DL (ref 8.5–10.5)
CALCIUM SERPL-MCNC: 9.6 MG/DL (ref 8.5–10.5)
PTH-INTACT SERPL-MCNC: 1.3 PG/ML (ref 14–72)

## 2018-10-23 PROCEDURE — A9270 NON-COVERED ITEM OR SERVICE: HCPCS

## 2018-10-23 PROCEDURE — 36415 COLL VENOUS BLD VENIPUNCTURE: CPT

## 2018-10-23 PROCEDURE — 500389 HCHG DRAIN, RESERVOIR SUCT JP 100CC: Performed by: SPECIALIST

## 2018-10-23 PROCEDURE — 160031 HCHG SURGERY MINUTES - 1ST 30 MINS LEVEL 5: Performed by: SPECIALIST

## 2018-10-23 PROCEDURE — 700111 HCHG RX REV CODE 636 W/ 250 OVERRIDE (IP)

## 2018-10-23 PROCEDURE — 160002 HCHG RECOVERY MINUTES (STAT): Performed by: SPECIALIST

## 2018-10-23 PROCEDURE — 500367 HCHG DRAIN KIT, HEMOVAC: Performed by: SPECIALIST

## 2018-10-23 PROCEDURE — 88305 TISSUE EXAM BY PATHOLOGIST: CPT

## 2018-10-23 PROCEDURE — 700101 HCHG RX REV CODE 250

## 2018-10-23 PROCEDURE — 501838 HCHG SUTURE GENERAL: Performed by: SPECIALIST

## 2018-10-23 PROCEDURE — 700102 HCHG RX REV CODE 250 W/ 637 OVERRIDE(OP): Performed by: SPECIALIST

## 2018-10-23 PROCEDURE — 501445 HCHG STAPLER, SKIN DISP: Performed by: SPECIALIST

## 2018-10-23 PROCEDURE — 502594 HCHG SCISSOR HANDLE, HARMONIC ACE: Performed by: SPECIALIST

## 2018-10-23 PROCEDURE — 82310 ASSAY OF CALCIUM: CPT

## 2018-10-23 PROCEDURE — 160036 HCHG PACU - EA ADDL 30 MINS PHASE I: Performed by: SPECIALIST

## 2018-10-23 PROCEDURE — 700111 HCHG RX REV CODE 636 W/ 250 OVERRIDE (IP): Performed by: ANESTHESIOLOGY

## 2018-10-23 PROCEDURE — 83970 ASSAY OF PARATHORMONE: CPT

## 2018-10-23 PROCEDURE — A9270 NON-COVERED ITEM OR SERVICE: HCPCS | Performed by: SPECIALIST

## 2018-10-23 PROCEDURE — G0378 HOSPITAL OBSERVATION PER HR: HCPCS

## 2018-10-23 PROCEDURE — 700102 HCHG RX REV CODE 250 W/ 637 OVERRIDE(OP)

## 2018-10-23 PROCEDURE — 160009 HCHG ANES TIME/MIN: Performed by: SPECIALIST

## 2018-10-23 PROCEDURE — A9270 NON-COVERED ITEM OR SERVICE: HCPCS | Performed by: ANESTHESIOLOGY

## 2018-10-23 PROCEDURE — A6402 STERILE GAUZE <= 16 SQ IN: HCPCS | Performed by: SPECIALIST

## 2018-10-23 PROCEDURE — 160048 HCHG OR STATISTICAL LEVEL 1-5: Performed by: SPECIALIST

## 2018-10-23 PROCEDURE — 160042 HCHG SURGERY MINUTES - EA ADDL 1 MIN LEVEL 5: Performed by: SPECIALIST

## 2018-10-23 PROCEDURE — 500368 HCHG DRAIN, 7MM FLAT-FLUTED: Performed by: SPECIALIST

## 2018-10-23 PROCEDURE — 160035 HCHG PACU - 1ST 60 MINS PHASE I: Performed by: SPECIALIST

## 2018-10-23 PROCEDURE — 88331 PATH CONSLTJ SURG 1 BLK 1SPC: CPT | Mod: 91

## 2018-10-23 PROCEDURE — 700102 HCHG RX REV CODE 250 W/ 637 OVERRIDE(OP): Performed by: ANESTHESIOLOGY

## 2018-10-23 PROCEDURE — 110454 HCHG SHELL REV 250: Performed by: SPECIALIST

## 2018-10-23 PROCEDURE — 88307 TISSUE EXAM BY PATHOLOGIST: CPT

## 2018-10-23 RX ORDER — HYDROMORPHONE HYDROCHLORIDE 2 MG/ML
0.2 INJECTION, SOLUTION INTRAMUSCULAR; INTRAVENOUS; SUBCUTANEOUS
Status: DISCONTINUED | OUTPATIENT
Start: 2018-10-23 | End: 2018-10-23 | Stop reason: HOSPADM

## 2018-10-23 RX ORDER — HYDRALAZINE HYDROCHLORIDE 20 MG/ML
5 INJECTION INTRAMUSCULAR; INTRAVENOUS
Status: DISCONTINUED | OUTPATIENT
Start: 2018-10-23 | End: 2018-10-23 | Stop reason: HOSPADM

## 2018-10-23 RX ORDER — OXYCODONE HYDROCHLORIDE 5 MG/1
5 TABLET ORAL
Status: COMPLETED | OUTPATIENT
Start: 2018-10-23 | End: 2018-10-23

## 2018-10-23 RX ORDER — HALOPERIDOL 5 MG/ML
1 INJECTION INTRAMUSCULAR
Status: DISCONTINUED | OUTPATIENT
Start: 2018-10-23 | End: 2018-10-23 | Stop reason: HOSPADM

## 2018-10-23 RX ORDER — OXYCODONE HCL 5 MG/5 ML
5 SOLUTION, ORAL ORAL
Status: DISCONTINUED | OUTPATIENT
Start: 2018-10-23 | End: 2018-10-23

## 2018-10-23 RX ORDER — LIDOCAINE HYDROCHLORIDE 10 MG/ML
INJECTION, SOLUTION INFILTRATION; PERINEURAL
Status: COMPLETED
Start: 2018-10-23 | End: 2018-10-23

## 2018-10-23 RX ORDER — HYDROMORPHONE HYDROCHLORIDE 2 MG/ML
0.4 INJECTION, SOLUTION INTRAMUSCULAR; INTRAVENOUS; SUBCUTANEOUS
Status: DISCONTINUED | OUTPATIENT
Start: 2018-10-23 | End: 2018-10-23 | Stop reason: HOSPADM

## 2018-10-23 RX ORDER — HYDROMORPHONE HYDROCHLORIDE 2 MG/ML
0.1 INJECTION, SOLUTION INTRAMUSCULAR; INTRAVENOUS; SUBCUTANEOUS
Status: DISCONTINUED | OUTPATIENT
Start: 2018-10-23 | End: 2018-10-23 | Stop reason: HOSPADM

## 2018-10-23 RX ORDER — MEPERIDINE HYDROCHLORIDE 25 MG/ML
12.5 INJECTION INTRAMUSCULAR; INTRAVENOUS; SUBCUTANEOUS
Status: DISCONTINUED | OUTPATIENT
Start: 2018-10-23 | End: 2018-10-23 | Stop reason: HOSPADM

## 2018-10-23 RX ORDER — SCOLOPAMINE TRANSDERMAL SYSTEM 1 MG/1
1 PATCH, EXTENDED RELEASE TRANSDERMAL
Status: DISCONTINUED | OUTPATIENT
Start: 2018-10-23 | End: 2018-10-24 | Stop reason: HOSPADM

## 2018-10-23 RX ORDER — OXYCODONE HYDROCHLORIDE 5 MG/1
10 TABLET ORAL
Status: COMPLETED | OUTPATIENT
Start: 2018-10-23 | End: 2018-10-23

## 2018-10-23 RX ORDER — OXYCODONE HYDROCHLORIDE 5 MG/1
5 TABLET ORAL
Status: DISCONTINUED | OUTPATIENT
Start: 2018-10-23 | End: 2018-10-24 | Stop reason: HOSPADM

## 2018-10-23 RX ORDER — ONDANSETRON 2 MG/ML
4 INJECTION INTRAMUSCULAR; INTRAVENOUS
Status: DISCONTINUED | OUTPATIENT
Start: 2018-10-23 | End: 2018-10-23 | Stop reason: HOSPADM

## 2018-10-23 RX ORDER — IBUPROFEN 200 MG
950 CAPSULE ORAL
Status: DISCONTINUED | OUTPATIENT
Start: 2018-10-23 | End: 2018-10-24 | Stop reason: HOSPADM

## 2018-10-23 RX ORDER — SODIUM CHLORIDE, SODIUM LACTATE, POTASSIUM CHLORIDE, CALCIUM CHLORIDE 600; 310; 30; 20 MG/100ML; MG/100ML; MG/100ML; MG/100ML
INJECTION, SOLUTION INTRAVENOUS ONCE
Status: COMPLETED | OUTPATIENT
Start: 2018-10-23 | End: 2018-10-23

## 2018-10-23 RX ORDER — OXYCODONE HCL 5 MG/5 ML
SOLUTION, ORAL ORAL
Status: DISPENSED
Start: 2018-10-23 | End: 2018-10-24

## 2018-10-23 RX ORDER — OXYCODONE HYDROCHLORIDE 10 MG/1
10 TABLET ORAL
Status: DISCONTINUED | OUTPATIENT
Start: 2018-10-23 | End: 2018-10-24 | Stop reason: HOSPADM

## 2018-10-23 RX ORDER — SODIUM CHLORIDE, SODIUM LACTATE, POTASSIUM CHLORIDE, CALCIUM CHLORIDE 600; 310; 30; 20 MG/100ML; MG/100ML; MG/100ML; MG/100ML
INJECTION, SOLUTION INTRAVENOUS CONTINUOUS
Status: DISCONTINUED | OUTPATIENT
Start: 2018-10-23 | End: 2018-10-23 | Stop reason: HOSPADM

## 2018-10-23 RX ORDER — HYDROMORPHONE HYDROCHLORIDE 2 MG/ML
0.5 INJECTION, SOLUTION INTRAMUSCULAR; INTRAVENOUS; SUBCUTANEOUS
Status: DISCONTINUED | OUTPATIENT
Start: 2018-10-23 | End: 2018-10-24 | Stop reason: HOSPADM

## 2018-10-23 RX ORDER — LIDOCAINE HYDROCHLORIDE AND EPINEPHRINE 10; 10 MG/ML; UG/ML
INJECTION, SOLUTION INFILTRATION; PERINEURAL
Status: DISCONTINUED | OUTPATIENT
Start: 2018-10-23 | End: 2018-10-23 | Stop reason: HOSPADM

## 2018-10-23 RX ORDER — OXYCODONE HCL 5 MG/5 ML
10 SOLUTION, ORAL ORAL
Status: DISCONTINUED | OUTPATIENT
Start: 2018-10-23 | End: 2018-10-23

## 2018-10-23 RX ORDER — DEXAMETHASONE SODIUM PHOSPHATE 4 MG/ML
4 INJECTION, SOLUTION INTRA-ARTICULAR; INTRALESIONAL; INTRAMUSCULAR; INTRAVENOUS; SOFT TISSUE
Status: DISCONTINUED | OUTPATIENT
Start: 2018-10-23 | End: 2018-10-24 | Stop reason: HOSPADM

## 2018-10-23 RX ORDER — ONDANSETRON 2 MG/ML
4 INJECTION INTRAMUSCULAR; INTRAVENOUS EVERY 4 HOURS PRN
Status: DISCONTINUED | OUTPATIENT
Start: 2018-10-23 | End: 2018-10-24 | Stop reason: HOSPADM

## 2018-10-23 RX ORDER — LABETALOL HYDROCHLORIDE 5 MG/ML
5 INJECTION, SOLUTION INTRAVENOUS
Status: DISCONTINUED | OUTPATIENT
Start: 2018-10-23 | End: 2018-10-23 | Stop reason: HOSPADM

## 2018-10-23 RX ORDER — ACETAMINOPHEN 500 MG
1000 TABLET ORAL EVERY 6 HOURS
Status: DISCONTINUED | OUTPATIENT
Start: 2018-10-23 | End: 2018-10-24 | Stop reason: HOSPADM

## 2018-10-23 RX ADMIN — Medication 0.5 ML: at 11:15

## 2018-10-23 RX ADMIN — HYDROMORPHONE HYDROCHLORIDE 0.4 MG: 2 INJECTION INTRAMUSCULAR; INTRAVENOUS; SUBCUTANEOUS at 17:10

## 2018-10-23 RX ADMIN — FENTANYL CITRATE 50 MCG: 50 INJECTION, SOLUTION INTRAMUSCULAR; INTRAVENOUS at 16:40

## 2018-10-23 RX ADMIN — LIDOCAINE HYDROCHLORIDE 0.5 ML: 10 INJECTION, SOLUTION INFILTRATION; PERINEURAL at 11:15

## 2018-10-23 RX ADMIN — ACETAMINOPHEN 1000 MG: 500 TABLET ORAL at 23:50

## 2018-10-23 RX ADMIN — CALCIUM CITRATE 200 MG (950 MG) TABLET 950 MG: at 19:49

## 2018-10-23 RX ADMIN — SODIUM CHLORIDE, SODIUM LACTATE, POTASSIUM CHLORIDE, CALCIUM CHLORIDE 1000 ML: 600; 310; 30; 20 INJECTION, SOLUTION INTRAVENOUS at 11:44

## 2018-10-23 RX ADMIN — ACETAMINOPHEN 1000 MG: 500 TABLET ORAL at 19:49

## 2018-10-23 RX ADMIN — OXYCODONE HYDROCHLORIDE 10 MG: 5 TABLET ORAL at 16:44

## 2018-10-23 RX ADMIN — FENTANYL CITRATE 50 MCG: 50 INJECTION, SOLUTION INTRAMUSCULAR; INTRAVENOUS at 17:04

## 2018-10-23 RX ADMIN — HYDROMORPHONE HYDROCHLORIDE 0.4 MG: 2 INJECTION INTRAMUSCULAR; INTRAVENOUS; SUBCUTANEOUS at 17:18

## 2018-10-23 ASSESSMENT — COGNITIVE AND FUNCTIONAL STATUS - GENERAL
DAILY ACTIVITIY SCORE: 24
MOBILITY SCORE: 24
SUGGESTED CMS G CODE MODIFIER MOBILITY: CH
SUGGESTED CMS G CODE MODIFIER DAILY ACTIVITY: CH

## 2018-10-23 ASSESSMENT — PAIN SCALES - GENERAL
PAINLEVEL_OUTOF10: 1
PAINLEVEL_OUTOF10: 8
PAINLEVEL_OUTOF10: 5
PAINLEVEL_OUTOF10: 5
PAINLEVEL_OUTOF10: 1
PAINLEVEL_OUTOF10: 1
PAINLEVEL_OUTOF10: 7
PAINLEVEL_OUTOF10: 8
PAINLEVEL_OUTOF10: 1

## 2018-10-23 ASSESSMENT — COPD QUESTIONNAIRES
DO YOU EVER COUGH UP ANY MUCUS OR PHLEGM?: NO/ONLY WITH OCCASIONAL COLDS OR INFECTIONS
HAVE YOU SMOKED AT LEAST 100 CIGARETTES IN YOUR ENTIRE LIFE: NO/DON'T KNOW
IN THE PAST 12 MONTHS DO YOU DO LESS THAN YOU USED TO BECAUSE OF YOUR BREATHING PROBLEMS: DISAGREE/UNSURE
COPD SCREENING SCORE: 2
DURING THE PAST 4 WEEKS HOW MUCH DID YOU FEEL SHORT OF BREATH: NONE/LITTLE OF THE TIME

## 2018-10-23 ASSESSMENT — PATIENT HEALTH QUESTIONNAIRE - PHQ9
2. FEELING DOWN, DEPRESSED, IRRITABLE, OR HOPELESS: NOT AT ALL
SUM OF ALL RESPONSES TO PHQ9 QUESTIONS 1 AND 2: 0
1. LITTLE INTEREST OR PLEASURE IN DOING THINGS: NOT AT ALL

## 2018-10-23 ASSESSMENT — LIFESTYLE VARIABLES
EVER_SMOKED: NEVER
ALCOHOL_USE: NO

## 2018-10-23 NOTE — OR SURGEON
Immediate Post OP Note    PreOp Diagnosis: malignant neoplasm thyroid    PostOp Diagnosis: same    Procedure(s):  THYROIDECTOMY TOTAL - Wound Class: Clean  Parathyroid autoimplantation - Wound Class: Clean  Needle EMG Xcr nerve with intraoperative monitoring    Surgeon(s):  VICKY Ceja M.D.    Anesthesiologist/Type of Anesthesia:  Anesthesiologist: Tobey Gansert, M.D./General    Surgical Staff:  Circulator: Rosibel Cordero R.N.  Relief Scrub: Gail Martinez  Scrub Person: Yadi Espino; Deepa Armstrong    Specimens removed if any:  ID Type Source Tests Collected by Time Destination   A : Rule out right parathyriod  Other Frozen Tissue PATHOLOGY SPECIMEN Angel Adams M.D. 10/23/2018  2:41 PM    B : Rule out right parathyroid number 2  Tissue Frozen Tissue PATHOLOGY SPECIMEN Angel Adams M.D. 10/23/2018  3:09 PM    C : Total Thyroid- Long stitch is right superior pole, short stitch is left superior pole  Tissue Other Tissue PATHOLOGY SPECIMEN Angel Adams M.D. 10/23/2018  3:20 PM        Estimated Blood Loss: 60 ml    Findings: r/o parathyroid FS x 2 positive for parathyroid, remainder autoimplanted.  hemovac drain    Complications: none        10/23/2018 4:41 PM Angel Adams M.D.

## 2018-10-24 VITALS
OXYGEN SATURATION: 92 % | RESPIRATION RATE: 17 BRPM | SYSTOLIC BLOOD PRESSURE: 141 MMHG | WEIGHT: 198.19 LBS | TEMPERATURE: 97.5 F | DIASTOLIC BLOOD PRESSURE: 82 MMHG | HEIGHT: 68 IN | HEART RATE: 90 BPM | BODY MASS INDEX: 30.04 KG/M2

## 2018-10-24 LAB
CALCIUM SERPL-MCNC: 9.8 MG/DL (ref 8.5–10.5)
CALCIUM SERPL-MCNC: 9.9 MG/DL (ref 8.5–10.5)
PATHOLOGY CONSULT NOTE: NORMAL

## 2018-10-24 PROCEDURE — 82310 ASSAY OF CALCIUM: CPT

## 2018-10-24 PROCEDURE — 700102 HCHG RX REV CODE 250 W/ 637 OVERRIDE(OP): Performed by: SPECIALIST

## 2018-10-24 PROCEDURE — 36415 COLL VENOUS BLD VENIPUNCTURE: CPT

## 2018-10-24 PROCEDURE — G0378 HOSPITAL OBSERVATION PER HR: HCPCS

## 2018-10-24 PROCEDURE — A9270 NON-COVERED ITEM OR SERVICE: HCPCS | Performed by: SPECIALIST

## 2018-10-24 RX ORDER — LEVOTHYROXINE SODIUM 0.12 MG/1
125 TABLET ORAL
Qty: 30 TAB | Refills: 12 | Status: SHIPPED | OUTPATIENT
Start: 2018-10-24 | End: 2019-01-22

## 2018-10-24 RX ADMIN — ACETAMINOPHEN 1000 MG: 500 TABLET ORAL at 12:04

## 2018-10-24 RX ADMIN — ACETAMINOPHEN 1000 MG: 500 TABLET ORAL at 05:12

## 2018-10-24 RX ADMIN — CALCIUM CITRATE 200 MG (950 MG) TABLET 950 MG: at 08:04

## 2018-10-24 RX ADMIN — CALCIUM CITRATE 200 MG (950 MG) TABLET 950 MG: at 12:04

## 2018-10-24 ASSESSMENT — PAIN SCALES - GENERAL
PAINLEVEL_OUTOF10: 1
PAINLEVEL_OUTOF10: 1
PAINLEVEL_OUTOF10: 0

## 2018-10-24 NOTE — CARE PLAN
Problem: Safety  Goal: Will remain free from injury  Outcome: PROGRESSING AS EXPECTED  Continue all safety measures.     Problem: Knowledge Deficit  Goal: Knowledge of disease process/condition, treatment plan, diagnostic tests, and medications will improve  Outcome: PROGRESSING AS EXPECTED  Discharge teaching regarding signs of low calcium.

## 2018-10-24 NOTE — PROGRESS NOTES
Dry dressing on drain site, Steri strips on incision intact and now swelling noted. Instructed he may get incision wet tomorrow.

## 2018-10-24 NOTE — DISCHARGE INSTRUCTIONS
Discharge Instructions    Discharged to home by car with relative. Discharged via wheelchair, hospital escort: Yes.  Special equipment needed: Not Applicable    Be sure to schedule a follow-up appointment with your primary care doctor or any specialists as instructed.     Discharge Plan:   Diet Plan: Discussed (as tolerated)  Activity Level: Discussed (no strenuous activity)  Confirmed Follow up Appointment: Patient to Call and Schedule Appointment  Medication Reconciliation Updated: Yes  Influenza Vaccine Indication: Not indicated: Previously immunized this influenza season and > 8 years of age    I understand that a diet low in cholesterol, fat, and sodium is recommended for good health. Unless I have been given specific instructions below for another diet, I accept this instruction as my diet prescription.   Other diet: regular    Special Instructions: None    · Is patient discharged on Warfarin / Coumadin?   No Thyroidectomy, Care After  Refer to this sheet in the next few weeks. These instructions provide you with information about caring for yourself after your procedure. Your health care provider may also give you more specific instructions. Your treatment has been planned according to current medical practices, but problems sometimes occur. Call your health care provider if you have any problems or questions after your procedure.  WHAT TO EXPECT AFTER THE PROCEDURE  After your procedure, it is typical to have:  · Mild pain in the neck or upper body, especially when swallowing.  · A sore throat.  · A weak voice.  HOME CARE INSTRUCTIONS   · Take medicines only as directed by your health care provider.  · If your entire thyroid gland was removed, you may need to take thyroid hormone medicine from now on.  · Do not take medicines that contain aspirin and ibuprofen until your health care provider says that you can. These medicines can increase your risk of bleeding.  · Some pain medicines cause constipation.  Drink enough fluid to keep your urine clear or pale yellow. This can help to prevent constipation.  · Start slowly with eating. You may need to have only liquids and soft foods for a few days or as directed by your health care provider.  · Do not take baths, swim, or use a hot tub until your health care provider approves.  · There are many different ways to close and cover an incision, including stitches (sutures), skin glue, and adhesive strips. Follow your health care provider's instructions for:  ¨ Incision care.  ¨ Bandage (dressing) changes and removal.  ¨ Incision closure removal.  · Resume your usual activities as directed by your health care provider.  · For the first 10 days after the procedure or as instructed by your health care provider:  ¨ Do not lift anything heavier than 20 lb (9.1 kg).  ¨ Do not jog, swim, or do other strenuous exercises.  ¨ Do not play contact sports.  · Keep all follow-up visits as directed by your health care provider. This is important.  SEEK MEDICAL CARE IF:  · The soreness in your throat gets worse.  · You have increased pain at your incision or incisions.  · You have increased bleeding from an incision.  · Your incision becomes infected. Watch for:  ¨ Swelling.  ¨ Redness.  ¨ Warmth.  ¨ Pus.  · You notice a bad smell coming from an incision or dressing.  · You have a fever.  · You feel lightheaded or faint.  · You have numbness, tingling, or muscle spasms in your:  ¨ Arms.  ¨ Hands.  ¨ Feet.  ¨ Face.  · You have trouble swallowing.  SEEK IMMEDIATE MEDICAL CARE IF:   · You develop a rash.  · You have difficulty breathing.  · You hear whistling noises coming from your chest.  · You develop a cough that gets worse.  · Your speech changes, or you have hoarseness that gets worse.     This information is not intended to replace advice given to you by your health care provider. Make sure you discuss any questions you have with your health care provider.     Document Released:  07/07/2006 Document Revised: 01/08/2016 Document Reviewed: 05/20/2015  [employer] Patient Education ©2016 [employer]      Depression / Suicide Risk    As you are discharged from this [facility] facility, it is important to learn how to keep safe from harming yourself.    Recognize the warning signs:  · Abrupt changes in personality, positive or negative- including increase in energy   · Giving away possessions  · Change in eating patterns- significant weight changes-  positive or negative  · Change in sleeping patterns- unable to sleep or sleeping all the time   · Unwillingness or inability to communicate  · Depression  · Unusual sadness, discouragement and loneliness  · Talk of wanting to die  · Neglect of personal appearance   · Rebelliousness- reckless behavior  · Withdrawal from people/activities they love  · Confusion- inability to concentrate     If you or a loved one observes any of these behaviors or has concerns about self-harm, here's what you can do:  · Talk about it- your feelings and reasons for harming yourself  · Remove any means that you might use to hurt yourself (examples: pills, rope, extension cords, firearm)  · Get professional help from the community (Mental Health, Substance Abuse, psychological counseling)  · Do not be alone:Call your Safe Contact- someone whom you trust who will be there for you.  · Call your local CRISIS HOTLINE 415-7318 or 845-273-2094  · Call your local Children's Mobile Crisis Response Team [place] (218) 097-7539 or www.[employer]  · Call the toll free National Suicide Prevention Hotlines   · National Suicide Prevention Lifeline 592-235-INMF (6364)  · National Hope Line Network 800-SUICIDE (799-7116)

## 2018-10-24 NOTE — OP REPORT
DATE OF SERVICE:  10/23/2018    SURGEON:  Angel Adams MD    ASSISTANT:  Matty Curran MD    ANESTHESIA:  General given endotracheal tube by Dr. Gansert.    PREOPERATIVE DIAGNOSIS:  Malignant neoplasm of thyroid.    POSTOPERATIVE DIAGNOSIS:  Malignant neoplasm of thyroid.    NAME OF THE PROCEDURE:  Total thyroidectomy for malignancy; parathyroid   autoimplantation; needle EMG of 10th cranial nerve with intraoperative   neurophysiology monitoring bilateral.    INDICATIONS:  Patient has a history of thyroid nodule noted incidentally on   carotid ultrasound to rule out carotid stenosis.  Subsequent evaluation and   aspiration biopsy demonstrated evidence of grade 6 [place] class cytology   consistent with papillary carcinoma of the thyroid.  MRI scan did not show   significant adenopathy.  He presents now for surgical intervention.    DESCRIPTION OF PROCEDURE:  Patient was brought in the operating room and   placed in supine position on the operating room table.  General anesthesia was   induced and the patient was endotracheally intubated using a nerve integrity   monitor endotracheal tube.  Once the patient was satisfactorily intubated, the   tube was secured into position and eyes protected with taping.  A shoulder   roll was placed beneath the shoulders to extend the neck.  Grounding   electrodes were placed for the nerve integrity monitor and the interelectrode   impedance was noted be less than 0.2 kilo ohms for both sets of electrodes.    Appropriate EMG responses were noted with external stimulation.  Continuous   monitoring was then undertaken throughout the procedure.    Approximately 4 mL of 1% Xylocaine with 1:100,000 units of epinephrine   solution was used to infiltrate the skin and subcutaneous tissue at the low   anterior neck patient, suspicion of thyroidectomy.  The patient was draped to   expose the neck for thyroid surgery.    A slightly curved incision was planned along the low anterior neck    approximately a fingerbreadth above the sternal notch and clavicles extending   from external jugular vein to external jugular vein.  Incision was then made,   approximately 4.5-5 cm in length, carried down through the skin and   subcutaneous tissue as well as platysmal layer.  Bleeding was controlled with   electrocautery as necessary.  Subplatysmal flaps were then elevated both   superiorly up to the level of the thyroid cartilage and inferiorly over the   sternum.  Midline fascia was then divided.  The midline raphae between the   strap muscles was identified and divided down to level of the thyroid capsule.    Thyroid isthmus was identified.  Again, bleeding was controlled as necessary   with electrocautery.    Attention was first directed to the left lobe of the thyroid and the strap   muscles were carefully dissected off of the capsule of the thyroid gland to   expose lateral aspect of the thyroid.  A very firm hard mass was present in   the superolateral aspect of the left lobe of the thyroid.  The middle thyroid   vein was identified and divided using harmonic focus device.  Strap muscles   were actually somewhat adherent to the thyroid capsule in the region of the   suspected cancer.  I elected to divide some of the strap muscle to leave a   small amount attached to the left lobe of the thyroid during dissection.    Dissection was undertaken along the superior pole of the left thyroid.  It was   dissected away from the cricothyroid medially.  Superior pole vessels were   identified and divided between clamps with silk tie ligature being used to   control bleeding.  Further mobilization along the superior pole was   undertaken.    Attention was directed to the left inferior pole area.  Careful dissection   along the inferior pole capsule of the thyroid from a medial to lateral   direction allows for division of inferior pole vessels with bipolar cautery or   harmonic focus device.  Dissection was  undertaken along the inferior pole   medially and inferiorly with what appears to be a fairly prominent parathyroid   gland identified.  This was further dissected away from the inferior pole   capsule with division of small vessels with bipolar cautery to allow for   mobilization of the parathyroid off of the thyroid capsule.  Further   dissection was undertaken along the lateral deep aspect of the thyroid   capsule.  It was very adherent to the underlying tissue and dissection was   difficult.  Left lobe of thyroid was also firmly attached to the trachea,   making mobilization and rotation very difficult.  Eventually, I am able to   identify what appears to be the recurrent laryngeal nerve along with what   appears to be superior pole parathyroid tissue along the undersurface of the   thyroid capsule.  The superior pole parathyroid tissue was carefully dissected   away from thyroid capsule and preserved.  The recurrent laryngeal nerve was   confirmed using the nerve integrity monitor with a nerve stimulus probe with a   stimulus intensity of 0.5 milliamps with appropriate EMG responses ____ along   the recurrent laryngeal nerve from an inferior to superior direction up to   the level of Francisco's ligament allowed for identification of entrance of the   nerve into the cricoarytenoid area.  Francisco's ligament was then carefully   divided to further mobilize the thyroid.  The trachea was noted and dissection   was somewhat difficulty along the isthmus and tracheal area.  It was elected   at this point to move to the right hand side.    Strap muscles were then elevated off of the right lobe thyroid area.  The   middle thyroid vein again identified laterally, divided with the harmonic   focus.  Dissection was then undertaken along the superior pole area as thyroid   lobe was dissected away from the cricothyroid medially.  Superior pole   vessels were identified and divided between clamps with silk tie ligature   being used  to control bleeding.  The lateral aspect of the right lobe was   further mobilized along the ____ to allow for rotation medially.  Attention   was then directed to the inferior pole area where again inferior pole vessels   were carefully divided along the capsule using bipolar cautery and the   harmonic focus device to free up the inferior pole area.  During dissection of   the inferior pole, what appears to be inferior pole parathyroid was noted to   be partially embedded in the inferior pole thyroid capsule.  Careful   dissection with tenotomy scissors and tying clamps were utilized to try to   preserve the vascularity to the inferior pole parathyroid, but this was felt   to be compromised.  The tissues then further dissected away from the thyroid   capsule and removed.  A small portion was taken from the suspected parathyroid   tissue and sent for frozen section to confirm histology.  Frozen section   returned as normal-appearing parathyroid tissue.  The remainder of the ____.    Once the inferior pole has been freed up, the right lobe of the thyroid was   freed up from the lateral anterior aspect of the trachea from an inferior to   superior direction.  Further dissection along the undersurface of the right   lobe eventually allows for identification of the recurrent laryngeal nerve.    Once this nerve was identified, confirmed again using the nerve integrity   monitor with a nerve stimulus probe at a stimulus intensity of 0.5 milliamps   with appropriate EMG response.  Once the area has been identified, further   dissection from an inferior to superior direction with division of blood   vessels along this area was undertaken with bipolar cautery or silk tie   ligature.  What appears to be superior pole parathyroid along the undersurface   of the right lobe of the thyroid was also again identified.  Unfortunately,   this was also firmly embedded and attached to the capsule of the thyroid and   attempts that  dissection away from the capsule yield compromise of the blood   supply again.  It was elected to further dissect away this tissue and remove   it from the thyroid capsule.  Once removed, again a very small portion was   sent to pathology for frozen section to confirm this identity.  Frozen section   returns as normal parathyroid.  The remainder of the parathyroid tissue was   again kept in saline for later autoimplantation.    Once the superior pole parathyroid has been addressed, the nerve was further   dissected up to the area of the cricoarytenoid.  Francisco's ligament was   carefully divided to allow for mobilization of the right lobe of the thyroid   on to the anterior trachea.  Further attachments along the superior aspect of   the isthmus were further divided to allow for further mobilization of the   right lobe and isthmus off the trachea.  The remaining attachments of the left   lobe of the thyroid to the trachea medial to the area of the cricoarytenoid   joint and recurrent laryngeal nerve were then carefully divided to eventually   out for removal of the thyroid gland in toto.    The excised total thyroid specimen was inspected and no additional residual   suspected parathyroid tissue was identified.  Specimen was marked and sent to   pathology in formalin.    Attention was redirected.  Irrigation was undertaken and bleeding points were   controlled with silk tie ligature or bipolar cautery if necessary.  The course   of the recurrent laryngeal nerves was inspected and the nerves appeared to be   intact and have been previously stimulated.  It was apparent on the left side   as previously tensioned.  Fibrillar Surgicel was applied to the   tracheoesophageal groove areas along the area of the recurrent laryngeal nerve   to help control any residual oozing in the region of the recurrent laryngeal   nerves.    At this point, pockets were created in both sternocleidomastoid muscles.  Both   the inferior and  superior pole right parathyroid remaining tissue was   carefully divided into small 1-2 mm segments and portions of the parathyroid   tissue was then placed into the pocket along both sternocleidomastoid muscle   areas.  Once the parathyroid tissue has been successfully implanted in this   fashion, 3-0 Vicryl suture was used to close the fascia over the   sternocleidomastoid muscle to hold the parathyroid tissue in place.  The strap   muscles were then approximated in the midline with interrupted stitches of   3-0 Vicryl after first placing a 10-[place] round Hemovac drain in the bed of   the wound.  The drain was brought out through a separate stab incision   inferiorly and secured with a 3-0 silk suture.  Platysmal layer was then   closed using interrupted buried deep stitches of 3-0 Vicryl.  A 3-0 Vicryl was   also used in interrupted buried subcutaneous fashion to approximate skin   margins.  A running intradermal/subcuticular stitch of 5-0 Prolene with a   central pullout loop then used for final skin approximation followed by   application of Steri-Strips.  Dressing was applied.  The procedure was then   terminated.  The patient was subsequently awakened from anesthesia and   extubated without difficulty.  He was taken from the operating room to the   recovery area, awake, breathing on his own in stable condition.  Blood loss   during the procedure was felt to be approximately 60-75 mL.  Sponge and needle   counts were correct.       ____________________________________     MD ARIADNE KENNEDY / ERMIAS    DD:  10/24/2018 08:17:24  DT:  10/24/2018 10:07:13    D#:  [id number]  Job#:  [id number]    cc: GRAY MONSALVE MD

## 2018-10-24 NOTE — PROGRESS NOTES
Assumed care of patient at 1900    Pt is A&O X 4  Pain 1/10.  Pt described as tolerable  Pt denies nausea  Tolerating Diet  Low anterior neck incision.  Dressing in place, clean dry intact  Hemovac insertion site, dressing in place, clean dry intact.  Drain compressed to suction.  Small amount of sanguineous drainage.  Positive Urine Void   Pt reports negative Flatus (immediate post op)   BM Void, PTA  Up standby assist   Bed in lowest position and locked.      ** Bed alarm on, CLIP, hourly rounding in place.     Reviewed plan of care with patient, bed in lowest position and locked, pt resting comfortably now, call light within reach, all needs met at this time

## 2018-10-24 NOTE — PROGRESS NOTES
Drain removed, dressing applied, discharge instructions gone over with patient including signs of low calcium.

## 2018-10-24 NOTE — CARE PLAN
Problem: Safety  Goal: Will remain free from falls  Pt remains free from fall at this time.  Pt educated on fall risk.  Pt demonstrates understanding by appropriate use of call light to call for assistance.  CLIP, hourly rounding in place    Problem: Venous Thromboembolism (VTW)/Deep Vein Thrombosis (DVT) Prevention:  Goal: Patient will participate in Venous Thrombosis (VTE)/Deep Vein Thrombosis (DVT)Prevention Measures   10/23/18 1925   Mechanical/VTE Prophylaxis   Mechanical Prophylaxis  SCDs, Sequential Compression Device   SCDs, Sequential Compression Device On

## 2018-10-24 NOTE — PROGRESS NOTES
C/o hunger, abd is soft, neck dressing dry and intact no swelling noted, talking and swallowing well. States pain ok at this time.

## 2018-11-05 ENCOUNTER — OFFICE VISIT (OUTPATIENT)
Dept: ENDOCRINOLOGY | Facility: MEDICAL CENTER | Age: 73
End: 2018-11-05
Payer: MEDICARE

## 2018-11-05 VITALS
HEIGHT: 68 IN | WEIGHT: 200 LBS | SYSTOLIC BLOOD PRESSURE: 116 MMHG | OXYGEN SATURATION: 95 % | BODY MASS INDEX: 30.31 KG/M2 | DIASTOLIC BLOOD PRESSURE: 68 MMHG | HEART RATE: 79 BPM

## 2018-11-05 DIAGNOSIS — I10 ESSENTIAL HYPERTENSION: ICD-10-CM

## 2018-11-05 DIAGNOSIS — C73 PAPILLARY THYROID CARCINOMA (HCC): ICD-10-CM

## 2018-11-05 DIAGNOSIS — E89.0 POSTSURGICAL HYPOTHYROIDISM: ICD-10-CM

## 2018-11-05 PROCEDURE — 99213 OFFICE O/P EST LOW 20 MIN: CPT | Performed by: INTERNAL MEDICINE

## 2018-11-05 NOTE — PROGRESS NOTES
"Endocrinology Clinic Progress Note    CC: Papillary thyroid carcinoma    HPI:  1. Papillary thyroid carcinoma (HCC)  He is status post total thyroidectomy on 10/23/2018.  Pathology report shows 2.5 cm papillary thyroid carcinoma, classic type, unifocal, in the left thyroid lobe, with superficial extrathyroidal extension without invasion of muscle, margins uninvolved, no vascular invasion, no lymphatic invasion, lymph nodes not assessed (however on ultrasound and neck MRI he did not have any abnormal appearing lymph nodes).  He denies any significant neck discomfort.  Denies numbness or tingling in fingers or around the lips.      2. Postsurgical hypothyroidism  He is currently on levothyroxine 125 mcg daily.  Reports compliance of medications.    3. Essential hypertension  Blood pressure is well controlled.  He reports compliance with medications.    ROS:  Constitutional: Negative for unintentional weight loss  Cardiac: Negative for palpitations    PMH:  Patient Active Problem List   Diagnosis   • H/O prostatectomy   • History of prostate cancer   • Essential hypertension   • Mixed hyperlipidemia   • CAD (coronary artery disease)   • Stenosis of right carotid artery   • S/P CABG (coronary artery bypass graft)   • Aortic stenosis   • Other and combined forms of senile cataract   • Nocturnal enuresis   • Arthritis of right knee   • Bilateral low back pain without sciatica   • Papillary thyroid carcinoma (HCC)   • Preop cardiovascular exam     EXAM:  Vital signs: /68 (BP Location: Right arm, Patient Position: Sitting)   Pulse 79   Ht 1.727 m (5' 8\")   Wt 90.7 kg (200 lb)   SpO2 95%   BMI 30.41 kg/m²    General: No apparent distress, cooperative  Eyes: No scleral icterus, no discharge  Neck: Well-healing thyroidectomy scar   Resp: Normal effort  Extremities: No lower extremity edema  Skin: No rash on visible skin  Psych: Alert and oriented, normal mood and affect    Assessment and Plan:    1. Papillary " thyroid carcinoma (HCC)  · Status post total thyroidectomy on October 23, 2018; pathology report shows 2.5 cm papillary thyroid carcinoma, classic type in the left thyroid lobe, with superficial extrathyroidal extension without invasion of muscle, margins uninvolved, no vascular invasion, no lymphatic invasion, lymph nodes not assessed (however on ultrasound and neck MRI he did not have any abnormal appearing lymph nodes)  · We discussed that he is at intermediate risk for recurrent/persistent disease, and radioactive iodine remnant ablation/adjuvant therapy is indicated  · We discussed that I would recommend  mCi I-131 dose, he will also discuss with his brother who is an oncologist, and we will place orders when I see him back in the clinic next week  · We briefly discussed about nuclear medicine radiation precautions    2. Postsurgical hypothyroidism  · Continue levothyroxine 125 mcg daily    3. Essential hypertension  · Blood pressure is well controlled    Return in about 2 weeks (around 11/19/2018).    Thank you for allowing me to participate in the care of this patient.    Bhanu Phan M.D.    CC:   Libra Washington M.D.    This note was created using voice recognition software (Dragon). The accuracy of the dictation is limited by the abilities of the software. I have reviewed the note prior to signing, however some errors in grammar and context are still possible. If you have any questions related to this note please do not hesitate to contact our office.

## 2018-11-16 ENCOUNTER — OFFICE VISIT (OUTPATIENT)
Dept: ENDOCRINOLOGY | Facility: MEDICAL CENTER | Age: 73
End: 2018-11-16
Payer: MEDICARE

## 2018-11-16 VITALS
DIASTOLIC BLOOD PRESSURE: 78 MMHG | SYSTOLIC BLOOD PRESSURE: 126 MMHG | HEART RATE: 85 BPM | HEIGHT: 68 IN | BODY MASS INDEX: 30.16 KG/M2 | WEIGHT: 199 LBS | OXYGEN SATURATION: 100 %

## 2018-11-16 DIAGNOSIS — I10 ESSENTIAL HYPERTENSION: ICD-10-CM

## 2018-11-16 DIAGNOSIS — C73 PAPILLARY THYROID CARCINOMA (HCC): ICD-10-CM

## 2018-11-16 DIAGNOSIS — E89.0 POSTSURGICAL HYPOTHYROIDISM: ICD-10-CM

## 2018-11-16 PROCEDURE — 99214 OFFICE O/P EST MOD 30 MIN: CPT | Performed by: INTERNAL MEDICINE

## 2018-11-16 RX ORDER — ONDANSETRON 4 MG/1
4 TABLET, FILM COATED ORAL EVERY 4 HOURS PRN
Qty: 20 TAB | Refills: 0 | Status: SHIPPED | OUTPATIENT
Start: 2018-11-16 | End: 2019-01-22

## 2018-11-16 NOTE — PROGRESS NOTES
"Endocrinology Clinic Progress Note    CC: Papillary thyroid carcinoma    HPI:  1. Papillary thyroid carcinoma (HCC)  He is status post total thyroidectomy on 10/23/2018.  Pathology report shows 2.5 cm papillary thyroid carcinoma, classic type, unifocal, in the left thyroid lobe, with superficial extrathyroidal extension without invasion of muscle, margins uninvolved, no vascular invasion, no lymphatic invasion, lymph nodes not assessed (however on ultrasound and neck MRI he did not have any abnormal appearing lymph nodes).  He denies any issues with neck discomfort.  No numbness or tingling in fingers or around the lips.      2. Postsurgical hypothyroidism  He is currently on levothyroxine 125 mcg daily.  Reports compliance with medications.    3. Essential hypertension  Blood pressure is well controlled.  He reports compliance with medications.    ROS:  Constitutional: Negative for unintentional weight loss  Cardiac: Negative for palpitations    PMH:  Patient Active Problem List   Diagnosis   • H/O prostatectomy   • History of prostate cancer   • Essential hypertension   • Mixed hyperlipidemia   • CAD (coronary artery disease)   • Stenosis of right carotid artery   • S/P CABG (coronary artery bypass graft)   • Aortic stenosis   • Other and combined forms of senile cataract   • Nocturnal enuresis   • Arthritis of right knee   • Bilateral low back pain without sciatica   • Papillary thyroid carcinoma (HCC)   • Preop cardiovascular exam     EXAM:  Vital signs: /78   Pulse 85   Ht 1.727 m (5' 8\")   Wt 90.3 kg (199 lb)   SpO2 100%   BMI 30.26 kg/m²    General: No apparent distress, cooperative  Eyes: No scleral icterus, no discharge  Neck: Well-healing thyroidectomy scar   Resp: Normal effort  Extremities: No lower extremity edema  Skin: No rash on visible skin  Psych: Alert and oriented, normal mood and affect    Assessment and Plan:    1. Papillary thyroid carcinoma (HCC)  · Status post total thyroidectomy " on October 23, 2018; pathology report shows 2.5 cm papillary thyroid carcinoma, classic type in the left thyroid lobe, with superficial extrathyroidal extension without invasion into muscle, margins uninvolved, no vascular invasion, no lymphatic invasion, lymph nodes not assessed (however on ultrasound and neck MRI he did not have any abnormal appearing lymph nodes)  · We discussed that he is at intermediate risk for recurrent/persistent disease, and radioactive iodine remnant ablation/adjuvant therapy is indicated; he discussed with his brother as well who is an oncologist and suggested 100 mCi I-131 treatment   · We are going to proceed with radioactive iodine remnant ablation/adjuvant therapy with 100 mCi I-131, to be prepared with Thyrogen, followed by posttreatment whole-body scan  · We discussed about low iodine diet to be followed for at least 10 days before radioactive iodine and to continue it for 2 additional days after getting radioactive iodine pill  · We discussed about nuclear medicine radiation precautions  · Advised to get labs for thyroglobulin panel in 3 weeks  · Advised to get thyroid bed ultrasound 6 months after the recent thyroidectomy    2. Postsurgical hypothyroidism  · Continue levothyroxine 125 mcg daily  · Repeat labs for TSH and free T4 in 3-weeks    3. Essential hypertension  · Blood pressure is well controlled    Return in about 4 weeks (around 12/14/2018).    Thank you for allowing me to participate in the care of this patient.    Bhanu Phan M.D.    CC:   Libra Washington M.D.    This note was created using voice recognition software (Dragon). The accuracy of the dictation is limited by the abilities of the software. I have reviewed the note prior to signing, however some errors in grammar and context are still possible. If you have any questions related to this note please do not hesitate to contact our office.

## 2018-11-24 DIAGNOSIS — E78.2 MIXED HYPERLIPIDEMIA: ICD-10-CM

## 2018-11-25 RX ORDER — ATORVASTATIN CALCIUM 10 MG/1
TABLET, FILM COATED ORAL
Qty: 90 TAB | Refills: 3 | Status: SHIPPED | OUTPATIENT
Start: 2018-11-25 | End: 2019-08-25 | Stop reason: SDUPTHER

## 2018-11-26 ENCOUNTER — HOSPITAL ENCOUNTER (OUTPATIENT)
Dept: RADIOLOGY | Facility: MEDICAL CENTER | Age: 73
End: 2018-11-26
Attending: INTERNAL MEDICINE
Payer: MEDICARE

## 2018-11-26 DIAGNOSIS — C73 PAPILLARY THYROID CARCINOMA (HCC): ICD-10-CM

## 2018-11-26 PROCEDURE — 700111 HCHG RX REV CODE 636 W/ 250 OVERRIDE (IP): Mod: JG

## 2018-11-27 ENCOUNTER — HOSPITAL ENCOUNTER (OUTPATIENT)
Dept: RADIOLOGY | Facility: MEDICAL CENTER | Age: 73
End: 2018-11-27
Attending: INTERNAL MEDICINE
Payer: MEDICARE

## 2018-11-27 PROCEDURE — 700111 HCHG RX REV CODE 636 W/ 250 OVERRIDE (IP): Mod: JG

## 2018-11-28 ENCOUNTER — HOSPITAL ENCOUNTER (OUTPATIENT)
Dept: LAB | Facility: MEDICAL CENTER | Age: 73
End: 2018-11-28
Attending: INTERNAL MEDICINE
Payer: MEDICARE

## 2018-11-28 ENCOUNTER — HOSPITAL ENCOUNTER (OUTPATIENT)
Dept: RADIOLOGY | Facility: MEDICAL CENTER | Age: 73
End: 2018-11-28
Attending: INTERNAL MEDICINE
Payer: MEDICARE

## 2018-11-28 DIAGNOSIS — C73 PAPILLARY THYROID CARCINOMA (HCC): ICD-10-CM

## 2018-11-28 LAB
ALBUMIN SERPL BCP-MCNC: 4.7 G/DL (ref 3.2–4.9)
ALBUMIN/GLOB SERPL: 1.4 G/DL
ALP SERPL-CCNC: 68 U/L (ref 30–99)
ALT SERPL-CCNC: 24 U/L (ref 2–50)
ANION GAP SERPL CALC-SCNC: 11 MMOL/L (ref 0–11.9)
AST SERPL-CCNC: 21 U/L (ref 12–45)
BILIRUB SERPL-MCNC: 0.8 MG/DL (ref 0.1–1.5)
BUN SERPL-MCNC: 28 MG/DL (ref 8–22)
CALCIUM SERPL-MCNC: 8 MG/DL (ref 8.5–10.5)
CHLORIDE SERPL-SCNC: 99 MMOL/L (ref 96–112)
CO2 SERPL-SCNC: 21 MMOL/L (ref 20–33)
CREAT SERPL-MCNC: 1.22 MG/DL (ref 0.5–1.4)
ERYTHROCYTE [DISTWIDTH] IN BLOOD BY AUTOMATED COUNT: 40.3 FL (ref 35.9–50)
GLOBULIN SER CALC-MCNC: 3.3 G/DL (ref 1.9–3.5)
GLUCOSE SERPL-MCNC: 117 MG/DL (ref 65–99)
HCT VFR BLD AUTO: 46.3 % (ref 42–52)
HGB BLD-MCNC: 16.1 G/DL (ref 14–18)
MCH RBC QN AUTO: 33.5 PG (ref 27–33)
MCHC RBC AUTO-ENTMCNC: 34.8 G/DL (ref 33.7–35.3)
MCV RBC AUTO: 96.3 FL (ref 81.4–97.8)
PLATELET # BLD AUTO: 247 K/UL (ref 164–446)
PMV BLD AUTO: 11.7 FL (ref 9–12.9)
POTASSIUM SERPL-SCNC: 4 MMOL/L (ref 3.6–5.5)
PROT SERPL-MCNC: 8 G/DL (ref 6–8.2)
RBC # BLD AUTO: 4.81 M/UL (ref 4.7–6.1)
SODIUM SERPL-SCNC: 131 MMOL/L (ref 135–145)
WBC # BLD AUTO: 6.4 K/UL (ref 4.8–10.8)

## 2018-11-28 PROCEDURE — A9530 I131 IODIDE SOL, RX: HCPCS

## 2018-11-28 PROCEDURE — 85027 COMPLETE CBC AUTOMATED: CPT

## 2018-11-28 PROCEDURE — 36415 COLL VENOUS BLD VENIPUNCTURE: CPT

## 2018-11-28 PROCEDURE — 80053 COMPREHEN METABOLIC PANEL: CPT

## 2018-11-28 PROCEDURE — 84443 ASSAY THYROID STIM HORMONE: CPT

## 2018-11-29 LAB — TSH SERPL DL<=0.005 MIU/L-ACNC: 191.75 UIU/ML (ref 0.38–5.33)

## 2018-12-12 ENCOUNTER — HOSPITAL ENCOUNTER (OUTPATIENT)
Dept: LAB | Facility: MEDICAL CENTER | Age: 73
End: 2018-12-12
Attending: INTERNAL MEDICINE
Payer: MEDICARE

## 2018-12-12 DIAGNOSIS — E89.0 POSTSURGICAL HYPOTHYROIDISM: ICD-10-CM

## 2018-12-12 DIAGNOSIS — C73 PAPILLARY THYROID CARCINOMA (HCC): ICD-10-CM

## 2018-12-12 LAB
T4 FREE SERPL-MCNC: 0.87 NG/DL (ref 0.53–1.43)
TSH SERPL DL<=0.005 MIU/L-ACNC: 1.37 UIU/ML (ref 0.38–5.33)

## 2018-12-12 PROCEDURE — 36415 COLL VENOUS BLD VENIPUNCTURE: CPT

## 2018-12-12 PROCEDURE — 84443 ASSAY THYROID STIM HORMONE: CPT

## 2018-12-12 PROCEDURE — 86800 THYROGLOBULIN ANTIBODY: CPT

## 2018-12-12 PROCEDURE — 84439 ASSAY OF FREE THYROXINE: CPT

## 2018-12-12 PROCEDURE — 84432 ASSAY OF THYROGLOBULIN: CPT

## 2018-12-13 ENCOUNTER — HOSPITAL ENCOUNTER (OUTPATIENT)
Dept: RADIOLOGY | Facility: MEDICAL CENTER | Age: 73
End: 2018-12-13
Attending: INTERNAL MEDICINE
Payer: MEDICARE

## 2018-12-13 DIAGNOSIS — C73 PAPILLARY THYROID CARCINOMA (HCC): ICD-10-CM

## 2018-12-13 PROCEDURE — 78018 THYROID MET IMAGING BODY: CPT

## 2018-12-14 LAB
THYROGLOB AB SERPL-ACNC: <0.9 IU/ML (ref 0–4)
THYROGLOB SERPL-MCNC: 4.7 NG/ML (ref 1.3–31.8)
THYROGLOB SERPL-MCNC: NORMAL NG/ML (ref 1.3–31.8)

## 2018-12-20 ENCOUNTER — OFFICE VISIT (OUTPATIENT)
Dept: ENDOCRINOLOGY | Facility: MEDICAL CENTER | Age: 73
End: 2018-12-20
Payer: MEDICARE

## 2018-12-20 VITALS
SYSTOLIC BLOOD PRESSURE: 142 MMHG | HEART RATE: 65 BPM | BODY MASS INDEX: 29.83 KG/M2 | DIASTOLIC BLOOD PRESSURE: 82 MMHG | WEIGHT: 196.8 LBS | HEIGHT: 68 IN | OXYGEN SATURATION: 97 %

## 2018-12-20 DIAGNOSIS — E89.0 POSTOPERATIVE HYPOTHYROIDISM: ICD-10-CM

## 2018-12-20 DIAGNOSIS — C73 PAPILLARY THYROID CARCINOMA (HCC): ICD-10-CM

## 2018-12-20 DIAGNOSIS — E89.0 STATUS POST THYROIDECTOMY: ICD-10-CM

## 2018-12-20 PROCEDURE — 99214 OFFICE O/P EST MOD 30 MIN: CPT | Performed by: PHYSICIAN ASSISTANT

## 2018-12-20 RX ORDER — LEVOTHYROXINE SODIUM 0.15 MG/1
150 TABLET ORAL
Qty: 30 TAB | Refills: 3 | Status: SHIPPED | OUTPATIENT
Start: 2018-12-20 | End: 2019-07-23

## 2018-12-20 NOTE — PROGRESS NOTES
Endocrinology Clinic Progress Note  PCP: Libra Washington M.D.    HPI:  Joshua Becerra is a 73 y.o. old patient who comes in today accompanied by his wife for review of endocrine problems.    Patient is feeling well overall and has been losing some weight which she is happy about.  He does complain of symptoms of loss of taste which is slowly coming back.    He denies symptoms of constipation, fatigue, lack of energy, dry skin, increased thirst, choking and/or swelling of the neck. He denies numbness or tingling in fingers or around the lips and or weakness.        1. Papillary thyroid carcinoma (HCC)- Intermediate risk  status post total thyroidectomy on 10/23/2018.   Status post 100 mCi 1-131   · posttreatment whole-body scan 14 days post administration of 101.1 mCi I-131.  There is residual activity seen within the thyroid bed.  There is normal salivary gland activity.  There is no evidence of metastatic disease.    Pathology report shows 2.5 cm papillary thyroid carcinoma, classic type, unifocal, in the left thyroid lobe, with superficial extrathyroidal extension without invasion of muscle, margins uninvolved, no vascular invasion, no lymphatic invasion, lymph nodes not assessed (however on ultrasound and neck MRI he did not have any abnormal appearing lymph nodes).     2. Status post thyroidectomy 10/23/2018     3. Postoperative hypothyroidism Target TSH 0.1-0.5ng/mL  currently taking levothyroxine 125 mcg daily.   Reports compliance with medications.     12/12/2018 TSH 1.370 free T4 0.87, thyroglobulin 4.7 antithyroglobulin less than 0.9    3. Essential hypertension  Blood pressure is well controlled.  He reports compliance with medications.      ROS:  Constitutional: No weight loss or gain,  fever  HEENT: No difficulty with swallowing, change in voice, or swelling in throat area   Cardiac: No chest pain, palpitations, or racing heart  Resp: No shortness of breath  GI: No abdominal pain, nausea,  vomiting, or diarrhea   Neuro: No numbness or tinging in feet  Endo: No heat or cold intolerance, no polyuria or polydipsia      Past Medical History:  Patient Active Problem List    Diagnosis Date Noted   • S/P CABG (coronary artery bypass graft) 12/15/2014     Priority: High   • CAD (coronary artery disease) 12/14/2014     Priority: High   • Aortic stenosis 02/10/2015     Priority: Medium   • Mixed hyperlipidemia 12/12/2014     Priority: Low   • Essential hypertension 12/01/2014     Priority: Low   • Preop cardiovascular exam 09/13/2018   • Papillary thyroid carcinoma (HCC) 09/06/2018   • Arthritis of right knee 09/16/2016   • Bilateral low back pain without sciatica 09/16/2016   • Nocturnal enuresis 10/28/2015   • Other and combined forms of senile cataract 06/04/2015   • Stenosis of right carotid artery 12/14/2014   • H/O prostatectomy 09/03/2013   • History of prostate cancer 09/03/2013       Medications:    Current Outpatient Prescriptions:   •  levothyroxine (SYNTHROID) 150 MCG Tab, Take 1 Tab by mouth Every morning on an empty stomach., Disp: 30 Tab, Rfl: 3  •  atorvastatin (LIPITOR) 10 MG Tab, TAKE 1 TAB BY MOUTH EVERY DAY.* DNFU 11/18/17, Disp: 90 Tab, Rfl: 3  •  levothyroxine (SYNTHROID) 125 MCG Tab, Take 1 Tab by mouth Every morning on an empty stomach., Disp: 30 Tab, Rfl: 12  •  Multiple Vitamins-Minerals (MULTIVITAMIN ADULT PO), Take 1 Tab by mouth every day., Disp: , Rfl:   •  clopidogrel (PLAVIX) 75 MG Tab, TAKE 1 TAB BY MOUTH EVERY DAY., Disp: 90 Tab, Rfl: 3  •  Cyanocobalamin (VITAMIN B-12) 1000 MCG Tab, Take 1,000 mcg by mouth every day., Disp: , Rfl:   •  losartan (COZAAR) 100 MG Tab, TAKE 1 TABLET BY MOUTH EVERY DAY, Disp: 90 Tab, Rfl: 3  •  metoprolol (LOPRESSOR) 25 MG Tab, TAKE 1 TAB BY MOUTH 2 TIMES A DAY., Disp: 180 Tab, Rfl: 3  •  Calcium 600 MG Tab, Take 1 Tab by mouth every day., Disp: , Rfl:   •  ondansetron (ZOFRAN) 4 MG Tab tablet, Take 1 Tab by mouth every four hours as needed for  "Nausea/Vomiting., Disp: 20 Tab, Rfl: 0    Labs: Reviewed as above     Physical Examination:  Vital signs: /82 (BP Location: Right arm)   Pulse 65   Ht 1.727 m (5' 8\")   Wt 89.3 kg (196 lb 12.8 oz)   SpO2 97%   BMI 29.92 kg/m²  Body mass index is 29.92 kg/m².  General: No apparent distress, cooperative  Eyes: No scleral icterus, no discharge, normal eyelids  Neck: No abnormal masses on inspection, normal thyroid exam  Resp: Normal effort, clear to auscultation bilaterally  CVS: Regular rate and rhythm, S1 S2 normal, no murmur  Extremities: No lower extremity edema  Abdomen: abdominal obesity present  Musculoskeletal: Normal digits and nails  Skin: No rash on visible skin  Psych: Alert and oriented, normal mood and affect, intact memory and able to make informed decisions.    Assessment and Plan:    1. Papillary thyroid carcinoma (HCC)  Advised to get thyroid bed ultrasound 4/2019  Reviewed above labs and test     2. Status post thyroidectomy-  incision healing well without any side effects.  There is no evidence of hematoma.  - FREE THYROXINE; Future  - THYROGLOBULIN, QT; Future  - ANTITHYROGLOBULIN AB; Future    3. Postoperative hypothyroidism   Change Levothyroxine to 150 mcg daily.  Discussed and reviewed side effect profile.  We also discussed side effects of being slightly overtreated.  He will watch for any of the side effects and if they should occur he will contact the office.    Repeat labs for TSH and free T4 in 6 weeks   Target TSH 0.1-0.5ng/mL     - TSH; Future  - levothyroxine (SYNTHROID) 150 MCG Tab; Take 1 Tab by mouth Every morning on an empty stomach.  Dispense: 30 Tab; Refill: 3    3. Essential hypertension  Blood pressure is controlled.  BP today is 142/82.  I have advised him to monitor his blood pressure at home and bring a log and contact primary care doctor if blood pressures are consistently over 140/80.         Return in about 2 months (around 2/20/2019).    Thank you for allowing " me to participate in the care of this patient.  If you have any questions or concerns please do not hesitate to contact me.    Priyanka Sandhu P.A.-C.    CC:   Libra Washington M.D.    This note was created using voice recognition software (Dragon). The accuracy of the dictation is limited by the abilities of the software. I have reviewed the note prior to signing, however some errors in grammar and context are still possible. If you have any questions related to this note please do not hesitate to contact our office.

## 2019-01-18 ENCOUNTER — HOSPITAL ENCOUNTER (OUTPATIENT)
Dept: LAB | Facility: MEDICAL CENTER | Age: 74
End: 2019-01-18
Attending: INTERNAL MEDICINE
Payer: MEDICARE

## 2019-01-18 DIAGNOSIS — C73 PAPILLARY THYROID CARCINOMA (HCC): ICD-10-CM

## 2019-01-18 DIAGNOSIS — E78.2 MIXED HYPERLIPIDEMIA: ICD-10-CM

## 2019-01-18 DIAGNOSIS — I10 ESSENTIAL HYPERTENSION: ICD-10-CM

## 2019-01-18 LAB
ALBUMIN SERPL BCP-MCNC: 4.2 G/DL (ref 3.2–4.9)
ALBUMIN/GLOB SERPL: 1.5 G/DL
ALP SERPL-CCNC: 54 U/L (ref 30–99)
ALT SERPL-CCNC: 16 U/L (ref 2–50)
ANION GAP SERPL CALC-SCNC: 7 MMOL/L (ref 0–11.9)
AST SERPL-CCNC: 17 U/L (ref 12–45)
BASOPHILS # BLD AUTO: 1.3 % (ref 0–1.8)
BASOPHILS # BLD: 0.04 K/UL (ref 0–0.12)
BILIRUB SERPL-MCNC: 0.6 MG/DL (ref 0.1–1.5)
BUN SERPL-MCNC: 17 MG/DL (ref 8–22)
CALCIUM SERPL-MCNC: 8.7 MG/DL (ref 8.5–10.5)
CHLORIDE SERPL-SCNC: 107 MMOL/L (ref 96–112)
CHOLEST SERPL-MCNC: 113 MG/DL (ref 100–199)
CO2 SERPL-SCNC: 26 MMOL/L (ref 20–33)
CREAT SERPL-MCNC: 0.97 MG/DL (ref 0.5–1.4)
EOSINOPHIL # BLD AUTO: 0.09 K/UL (ref 0–0.51)
EOSINOPHIL NFR BLD: 3 % (ref 0–6.9)
ERYTHROCYTE [DISTWIDTH] IN BLOOD BY AUTOMATED COUNT: 44.5 FL (ref 35.9–50)
FASTING STATUS PATIENT QL REPORTED: NORMAL
GLOBULIN SER CALC-MCNC: 2.8 G/DL (ref 1.9–3.5)
GLUCOSE SERPL-MCNC: 93 MG/DL (ref 65–99)
HCT VFR BLD AUTO: 40.8 % (ref 42–52)
HDLC SERPL-MCNC: 43 MG/DL
HGB BLD-MCNC: 13.9 G/DL (ref 14–18)
IMM GRANULOCYTES # BLD AUTO: 0 K/UL (ref 0–0.11)
IMM GRANULOCYTES NFR BLD AUTO: 0 % (ref 0–0.9)
LDLC SERPL CALC-MCNC: 52 MG/DL
LYMPHOCYTES # BLD AUTO: 0.94 K/UL (ref 1–4.8)
LYMPHOCYTES NFR BLD: 31 % (ref 22–41)
MCH RBC QN AUTO: 33.3 PG (ref 27–33)
MCHC RBC AUTO-ENTMCNC: 34.1 G/DL (ref 33.7–35.3)
MCV RBC AUTO: 97.8 FL (ref 81.4–97.8)
MONOCYTES # BLD AUTO: 0.46 K/UL (ref 0–0.85)
MONOCYTES NFR BLD AUTO: 15.2 % (ref 0–13.4)
NEUTROPHILS # BLD AUTO: 1.5 K/UL (ref 1.82–7.42)
NEUTROPHILS NFR BLD: 49.5 % (ref 44–72)
NRBC # BLD AUTO: 0 K/UL
NRBC BLD-RTO: 0 /100 WBC
PLATELET # BLD AUTO: 205 K/UL (ref 164–446)
PMV BLD AUTO: 10.5 FL (ref 9–12.9)
POTASSIUM SERPL-SCNC: 3.7 MMOL/L (ref 3.6–5.5)
PROT SERPL-MCNC: 7 G/DL (ref 6–8.2)
RBC # BLD AUTO: 4.17 M/UL (ref 4.7–6.1)
SODIUM SERPL-SCNC: 140 MMOL/L (ref 135–145)
T4 FREE SERPL-MCNC: 1.39 NG/DL (ref 0.53–1.43)
TRIGL SERPL-MCNC: 90 MG/DL (ref 0–149)
TSH SERPL DL<=0.005 MIU/L-ACNC: 0.06 UIU/ML (ref 0.38–5.33)
WBC # BLD AUTO: 3 K/UL (ref 4.8–10.8)

## 2019-01-18 PROCEDURE — 36415 COLL VENOUS BLD VENIPUNCTURE: CPT

## 2019-01-18 PROCEDURE — 80053 COMPREHEN METABOLIC PANEL: CPT

## 2019-01-18 PROCEDURE — 80061 LIPID PANEL: CPT

## 2019-01-18 PROCEDURE — 84439 ASSAY OF FREE THYROXINE: CPT

## 2019-01-18 PROCEDURE — 85025 COMPLETE CBC W/AUTO DIFF WBC: CPT

## 2019-01-18 PROCEDURE — 84443 ASSAY THYROID STIM HORMONE: CPT

## 2019-01-21 ENCOUNTER — TELEPHONE (OUTPATIENT)
Dept: MEDICAL GROUP | Age: 74
End: 2019-01-21

## 2019-01-22 ENCOUNTER — OFFICE VISIT (OUTPATIENT)
Dept: MEDICAL GROUP | Age: 74
End: 2019-01-22
Payer: MEDICARE

## 2019-01-22 VITALS
HEART RATE: 62 BPM | TEMPERATURE: 97.3 F | DIASTOLIC BLOOD PRESSURE: 76 MMHG | OXYGEN SATURATION: 95 % | SYSTOLIC BLOOD PRESSURE: 134 MMHG | WEIGHT: 196.2 LBS | HEIGHT: 68 IN | BODY MASS INDEX: 29.73 KG/M2

## 2019-01-22 DIAGNOSIS — E78.2 MIXED HYPERLIPIDEMIA: ICD-10-CM

## 2019-01-22 DIAGNOSIS — I10 ESSENTIAL HYPERTENSION: ICD-10-CM

## 2019-01-22 DIAGNOSIS — N39.44 NOCTURNAL ENURESIS: ICD-10-CM

## 2019-01-22 DIAGNOSIS — Z85.850 HISTORY OF PAPILLARY ADENOCARCINOMA OF THYROID: ICD-10-CM

## 2019-01-22 DIAGNOSIS — R79.89 LOW SERUM PARATHYROID HORMONE (PTH): ICD-10-CM

## 2019-01-22 PROCEDURE — 99214 OFFICE O/P EST MOD 30 MIN: CPT | Performed by: INTERNAL MEDICINE

## 2019-01-22 PROCEDURE — 8041 PR SCP AHA: Performed by: INTERNAL MEDICINE

## 2019-01-22 ASSESSMENT — PATIENT HEALTH QUESTIONNAIRE - PHQ9: CLINICAL INTERPRETATION OF PHQ2 SCORE: 0

## 2019-01-22 NOTE — ASSESSMENT & PLAN NOTE
He is currently taking metoprolol 25 mg twice daily, losartan 100 mg daily and Plavix 75 mg daily.  His blood pressure has been stable and well controlled.  Patient only has one episode of atrial fibrillation after CABG on 12/15/14.  He did not have any recurrent arrhythmia.  He has been on sinus rhythm since then.  His recent EKG on 9/13/18 showed sinus rhythm.  Patient follow-up with cardiologist regularly.

## 2019-01-22 NOTE — ASSESSMENT & PLAN NOTE
Patient had total thyroidectomy on 10/23/18.  He did the whole-body iodine scan on 12/13/19 and did not show metastatic thyroid cancer.  Patient stated that his endocrinologist increased her dose of levothyroxine from 125 mcg to 150 mcg on 12/20/18.  His TSH is improved and suppressed more by high-dose levothyroxine.  He denied side effects from taking levothyroxine and denied palpitation or temperature intolerance or loose stool.  Patient has follow-up appointment with endocrinologist on 2/20/19.

## 2019-01-22 NOTE — PROGRESS NOTES
Annual Health Assessment Questions:    1.  Are you currently engaging in any exercise or physical activity? Yes    2.  How would you describe your mood or emotional well-being today? fair    3.  Have you had any falls in the last year? No    4.  Have you noticed any problems with your balance or had difficulty walking? No    5.  In the last six months have you experienced any leakage of urine? Yes, when sneezing or cough    6. DPA/Advanced Directive: Patient does not have an Advanced Directive.  A packet and workshop information was given on Advanced Directives.

## 2019-01-22 NOTE — ASSESSMENT & PLAN NOTE
Stable.  He is currently taking atorvastatin 10 mg every evening as directed.  Denies side effects--no myalgias or abdominal pain.  Reports diet is low-fat diet.  He still eats high carbohydrate diet.  He is not exercising regularly.  He is not taking ASA daily.  He is taking Plavix 75 mg daily without side effects.  He denies dizziness, claudication, or chest pain.  I reviewed blood test result with him in clinic today.    Results for ANNE JESENIA KIRSTEN (MRN [id number]) as of 1/22/2019 14:15   Ref. Range 1/18/2019 08:34   Cholesterol,Tot Latest Ref Range: 100 - 199 mg/dL 113   Triglycerides Latest Ref Range: 0 - 149 mg/dL 90   HDL Latest Ref Range: >=40 mg/dL 43   LDL Latest Ref Range: <100 mg/dL 52

## 2019-01-22 NOTE — PROGRESS NOTES
Subjective:   Joshua Becerra is a 73 y.o. male here today for evaluation and management of:      Mixed hyperlipidemia  Stable.  He is currently taking atorvastatin 10 mg every evening as directed.  Denies side effects--no myalgias or abdominal pain.  Reports diet is low-fat diet.  He still eats high carbohydrate diet.  He is not exercising regularly.  He is not taking ASA daily.  He is taking Plavix 75 mg daily without side effects.  He denies dizziness, claudication, or chest pain.  I reviewed blood test result with him in clinic today.    Results for JOSHUA BECERRA (MRN [id number]) as of 1/22/2019 14:15   Ref. Range 1/18/2019 08:34   Cholesterol,Tot Latest Ref Range: 100 - 199 mg/dL 113   Triglycerides Latest Ref Range: 0 - 149 mg/dL 90   HDL Latest Ref Range: >=40 mg/dL 43   LDL Latest Ref Range: <100 mg/dL 52             History of papillary adenocarcinoma of thyroid  Patient had total thyroidectomy on 10/23/18.  He did the whole-body iodine scan on 12/13/19 and did not show metastatic thyroid cancer.  Patient stated that his endocrinologist increased her dose of levothyroxine from 125 mcg to 150 mcg on 12/20/18.  His TSH is improved and suppressed more by high-dose levothyroxine.  He denied side effects from taking levothyroxine and denied palpitation or temperature intolerance or loose stool.  Patient has follow-up appointment with endocrinologist on 2/20/19.    Essential hypertension  He is currently taking metoprolol 25 mg twice daily, losartan 100 mg daily and Plavix 75 mg daily.  His blood pressure has been stable and well controlled.  Patient only has one episode of atrial fibrillation after CABG on 12/15/14.  He did not have any recurrent arrhythmia.  He has been on sinus rhythm since then.  His recent EKG on 9/13/18 showed sinus rhythm.  Patient follow-up with cardiologist regularly.    Nocturnal enuresis  Patient has very mild urinary incontinence after prostatectomy in 2008 for  "prostate cancer.  He stated that he has very mild incontinence when he is coughing and sneezing.  He did not want to take any medication or do any procedure.  I discussed with patient for exercise to improve pelvic muscle wall.  He will continue doing conservative treatment with exercise.         Current medicines (including changes today)  Current Outpatient Prescriptions   Medication Sig Dispense Refill   • levothyroxine (SYNTHROID) 150 MCG Tab Take 1 Tab by mouth Every morning on an empty stomach. 30 Tab 3   • atorvastatin (LIPITOR) 10 MG Tab TAKE 1 TAB BY MOUTH EVERY DAY.* DNFU 11/18/17 90 Tab 3   • Multiple Vitamins-Minerals (MULTIVITAMIN ADULT PO) Take 1 Tab by mouth every day.     • clopidogrel (PLAVIX) 75 MG Tab TAKE 1 TAB BY MOUTH EVERY DAY. 90 Tab 3   • Cyanocobalamin (VITAMIN B-12) 1000 MCG Tab Take 1,000 mcg by mouth every day.     • losartan (COZAAR) 100 MG Tab TAKE 1 TABLET BY MOUTH EVERY DAY 90 Tab 3   • metoprolol (LOPRESSOR) 25 MG Tab TAKE 1 TAB BY MOUTH 2 TIMES A DAY. 180 Tab 3   • Calcium 600 MG Tab Take 1 Tab by mouth every day.       No current facility-administered medications for this visit.      He  has a past medical history of Aortic stenosis; CAD (coronary artery disease); Cancer (HCC); Carotid artery disease (HCC); Disorder of thyroid (10/18/2018); High cholesterol; Hyperlipidemia; Hypertension; Unspecified cataract; Unspecified urinary incontinence (10/18/2018); and Valvular heart disease.    ROS   No chest pain, no shortness of breath, no abdominal pain       Objective:     Blood pressure 134/76, pulse 62, temperature 36.3 °C (97.3 °F), temperature source Temporal, height 1.727 m (5' 8\"), weight 89 kg (196 lb 3.2 oz), SpO2 95 %. Body mass index is 29.83 kg/m².   Physical Exam:  General: Alert, oriented and no acute distress.  Eye contact is good, speech goal directed, affect calm  HEENT: conjunctiva non-injected, sclera non-icteric.  Oral mucous membranes pink and moist with no " lesions.  Pinna normal.   Lungs: Normal respiratory effort, clear to auscultation bilaterally with good excursion.  CV: regular rate and rhythm. No murmurs.  Abdomen: soft, non distended, nontender, Bowel sound normal.  Ext: no edema, color normal, vascularity normal, temperature normal        Assessment and Plan:   The following treatment plan was discussed     1. History of papillary adenocarcinoma of thyroid  - Patient has told her thyroidectomy on 10/23/18 and he has negative whole-body iodine scan on 12/13/18.  He tolerates current dose of levothyroxine.  He is advised to continue levothyroxine 150 mcg 1 tablet every morning on empty stomach and follow-up with endocrinologist as scheduled in February 2019.    2. Mixed hyperlipidemia  - Well-controlled. Continue current regimens, atorvastatin 10 mg every evening. Recheck lab 1-2 weeks before next follow up visit.  - Reviewed the risks and benefits of treatment and potential side effects of medication.  - Advised to eat low fat, low carbohydrate and high fiber diet as well as do cardio physical exercise regularly.  - COMP METABOLIC PANEL; Future  - Lipid Profile; Future    3. Essential hypertension  - Well-controlled. Continue current regimens, metoprolol 25 mg twice daily and losartan 100 mg daily. Recheck lab 1-2 weeks before next follow up visit.  - Reviewed the risks and benefits as well as potential side effects of medications with patient.  - Discussed to eat low-sodium diet and encouraged to do regular physical exercise.  - Recommend to monitor blood pressure and heart rate at home.  - CBC WITH DIFFERENTIAL; Future  - COMP METABOLIC PANEL; Future    4. Low serum parathyroid hormone (PTH)  - Patient has one time reading low intact PTH while it was checked for parathyroid hormone by surgeon during total thyroidectomy procedure on 10/23/18.  Repeat PTH hormone for follow-up.  He will to PTH test before follow-up appointment with endocrine.  - PTH INTACT (PTH  ONLY); Future    5. Nocturnal enuresis  - Mild symptoms.  He preferred to continue conservative treatment with exercise.  I discussed with patient for bladder training exercise and pelvic muscle wall exercise.  He is advised to avoid bladder irritants.    7. Health Maintenance   - Patient has colonoscopy with GI consultant on 8/2/05.  He is overdue for colonoscopy.  He stated that he is going to call GI consultant for scheduling colonoscopy.    AHA and MDX form are reviewed and completed in clinic today.    Followup: Return if symptoms worsen or fail to improve, for Hypertension, Hyperlipidemia, Hypothyroid, Lab review.      Please note that this dictation was created using voice recognition software. I have made every reasonable attempt to correct obvious errors, but I expect that there may have unintended errors in text, spelling, punctuation, or grammar that I did not discover.

## 2019-01-22 NOTE — ASSESSMENT & PLAN NOTE
Patient has very mild urinary incontinence after prostatectomy in 2008 for prostate cancer.  He stated that he has very mild incontinence when he is coughing and sneezing.  He did not want to take any medication or do any procedure.  I discussed with patient for exercise to improve pelvic muscle wall.  He will continue doing conservative treatment with exercise.

## 2019-02-13 ENCOUNTER — TELEPHONE (OUTPATIENT)
Dept: ENDOCRINOLOGY | Facility: MEDICAL CENTER | Age: 74
End: 2019-02-13

## 2019-02-13 NOTE — TELEPHONE ENCOUNTER
1. Caller Name: Ed                                         Call Back Number: [phone] (home)         Patient approves a detailed voicemail message: no    Please put in labs for patient to get before apt on the 2/20. Also does he need to fast for these? (looks like ketan chen has him getting a lipid profile done told him if he ws to do all of them at one that he would have to fast for that one.)

## 2019-02-14 NOTE — TELEPHONE ENCOUNTER
Phone Number Called: [phone] (home)       Message: lm for patient that labs are requested and to call us if he needs them sent out of [place].    Left Message for patient to call back: no

## 2019-02-19 ENCOUNTER — HOSPITAL ENCOUNTER (OUTPATIENT)
Dept: LAB | Facility: MEDICAL CENTER | Age: 74
End: 2019-02-19
Attending: PHYSICIAN ASSISTANT
Payer: MEDICARE

## 2019-02-19 DIAGNOSIS — E89.0 STATUS POST THYROIDECTOMY: ICD-10-CM

## 2019-02-19 DIAGNOSIS — E89.0 POSTOPERATIVE HYPOTHYROIDISM: ICD-10-CM

## 2019-02-19 LAB
T4 FREE SERPL-MCNC: 1.27 NG/DL (ref 0.53–1.43)
TSH SERPL DL<=0.005 MIU/L-ACNC: 0.03 UIU/ML (ref 0.38–5.33)

## 2019-02-19 PROCEDURE — 36415 COLL VENOUS BLD VENIPUNCTURE: CPT

## 2019-02-19 PROCEDURE — 84432 ASSAY OF THYROGLOBULIN: CPT

## 2019-02-19 PROCEDURE — 84439 ASSAY OF FREE THYROXINE: CPT

## 2019-02-19 PROCEDURE — 86800 THYROGLOBULIN ANTIBODY: CPT

## 2019-02-19 PROCEDURE — 84443 ASSAY THYROID STIM HORMONE: CPT

## 2019-02-20 DIAGNOSIS — I10 ESSENTIAL HYPERTENSION: ICD-10-CM

## 2019-02-20 RX ORDER — LOSARTAN POTASSIUM 100 MG/1
TABLET ORAL
Qty: 90 TAB | Refills: 3 | Status: SHIPPED | OUTPATIENT
Start: 2019-02-20 | End: 2019-12-09 | Stop reason: SDUPTHER

## 2019-02-21 LAB
THYROGLOB AB SERPL-ACNC: <0.9 IU/ML (ref 0–4)
THYROGLOB SERPL-MCNC: 1.2 NG/ML (ref 1.3–31.8)
THYROGLOB SERPL-MCNC: ABNORMAL NG/ML (ref 1.3–31.8)

## 2019-03-27 ENCOUNTER — OFFICE VISIT (OUTPATIENT)
Dept: ENDOCRINOLOGY | Facility: MEDICAL CENTER | Age: 74
End: 2019-03-27
Payer: MEDICARE

## 2019-03-27 VITALS
OXYGEN SATURATION: 100 % | HEIGHT: 68 IN | WEIGHT: 195 LBS | SYSTOLIC BLOOD PRESSURE: 130 MMHG | RESPIRATION RATE: 16 BRPM | DIASTOLIC BLOOD PRESSURE: 70 MMHG | HEART RATE: 67 BPM | BODY MASS INDEX: 29.55 KG/M2

## 2019-03-27 DIAGNOSIS — E89.0 POST-OPERATIVE HYPOTHYROIDISM: ICD-10-CM

## 2019-03-27 DIAGNOSIS — Z85.850 HISTORY OF PAPILLARY ADENOCARCINOMA OF THYROID: ICD-10-CM

## 2019-03-27 DIAGNOSIS — E78.2 MIXED HYPERLIPIDEMIA: ICD-10-CM

## 2019-03-27 DIAGNOSIS — I10 ESSENTIAL HYPERTENSION: ICD-10-CM

## 2019-03-27 PROCEDURE — 99213 OFFICE O/P EST LOW 20 MIN: CPT | Performed by: PHYSICIAN ASSISTANT

## 2019-03-27 NOTE — PATIENT INSTRUCTIONS
Levothyroxine - 6 days 150 mcg and day #7 1/2 tablet     Follow up with Primary care for abnormal Hemoglobin level.     Prior to next appt US of the neck

## 2019-03-27 NOTE — PROGRESS NOTES
Endocrinology Clinic Progress Note  PCP: Libra Washington M.D.    HPI:  Joshua Becerra is a 73 y.o. old patient who comes in today for review of endocrine problems.    1. History of papillary adenocarcinoma of thyroid  S/p thyroidectomy 10/2018- Will need continued follow up.       2. Postoperative hypothyroidism:   Levothyroxine 150 mcg daily   Patient reports compliance  Patient denies side effects    Patient is currently feeling well he is without any side effects of shaking, tremor, diarrhea, irritability and/or anxiety.  He has been working overtime recently secondary to being a .  He does experience some fatigue at times.    3. Mixed hyperlipidemia  Continue Lipitor 10 mg daily-patient takes it at night  Patient reports compliance  Denies side effect profile  Patient's last lipids within normal limits    4. Essential hypertension  Patient is currently on metoprolol and Cozaar  Again without side effects  Patient is due to follow-up with cardiology in April 2019  Patient denies missing and/or skipping any doses.  BP today is stable and controlled      Endocrine history to date:   1. Papillary thyroid carcinoma (HCC)- Intermediate risk  status post total thyroidectomy on 10/23/2018.   Status post 100 mCi 1-131   · posttreatment whole-body scan 14 days post administration of 101.1 mCi I-131.  There is residual activity seen within the thyroid bed.  There is normal salivary gland activity.  There is no evidence of metastatic disease.     Pathology report shows 2.5 cm papillary thyroid carcinoma, classic type, unifocal, in the left thyroid lobe, with superficial extrathyroidal extension without invasion of muscle, margins uninvolved, no vascular invasion, no lymphatic invasion, lymph nodes not assessed (however on ultrasound and neck MRI he did not have any abnormal appearing lymph nodes).      2/19/2019  thyroglobulin 1.2 antithyroglobulin< 0.9  12/12/2018  thyroglobulin 4.7 antithyroglobulin  <0.9      2. Status post thyroidectomy 10/23/2018      3. Postoperative hypothyroidism Target TSH 0.1-0.5ng/mL    2/19/2019 TSH 0.030, free T4 1.27-T4 150 mcg daily-reviewed 3/19  1/18/2019 TSH 0.060, free T4 1.39, T4 150 mcg daily-reviewed 3/19  12/12/2018 TSH 1.370 free T4 0.87, thyroglobulin 4.7 antithyroglobulin less than 0.9     3. Mixed hyperlipidemia  1/18/2019 total cholesterol 113, triglycerides 90, HDL 43, LDL 52    4. Essential hypertension  3/2019 blood pressure 130/70    ROS:  Constitutional: No weight loss or gain,  fever  HEENT: No difficulty with swallowing, change in voice, or swelling in throat area   Cardiac: No chest pain, palpitations, or racing heart  Resp: No shortness of breath  GI: No abdominal pain, nausea, vomiting, or diarrhea   Neuro: No numbness or tinging in feet  Endo: No heat or cold intolerance, no polyuria or polydipsia      Past Medical History:  Patient Active Problem List    Diagnosis Date Noted   • S/P CABG (coronary artery bypass graft) 12/15/2014     Priority: High   • CAD (coronary artery disease) 12/14/2014     Priority: High   • Aortic stenosis 02/10/2015     Priority: Medium   • Mixed hyperlipidemia 12/12/2014     Priority: Low   • Essential hypertension 12/01/2014     Priority: Low   • History of papillary adenocarcinoma of thyroid 09/06/2018   • Arthritis of right knee 09/16/2016   • Bilateral low back pain without sciatica 09/16/2016   • Nocturnal enuresis 10/28/2015   • Other and combined forms of senile cataract 06/04/2015   • Stenosis of right carotid artery 12/14/2014   • H/O prostatectomy 09/03/2013   • History of prostate cancer 09/03/2013       Medications:    Current Outpatient Prescriptions:   •  losartan (COZAAR) 100 MG Tab, TAKE 1 TABLET BY MOUTH EVERY DAY, Disp: 90 Tab, Rfl: 3  •  metoprolol (LOPRESSOR) 25 MG Tab, TAKE 1 TAB BY MOUTH 2 TIMES A DAY., Disp: 180 Tab, Rfl: 3  •  levothyroxine (SYNTHROID) 150 MCG Tab, Take 1 Tab by mouth Every morning on an  "empty stomach., Disp: 30 Tab, Rfl: 3  •  atorvastatin (LIPITOR) 10 MG Tab, TAKE 1 TAB BY MOUTH EVERY DAY.* DNFU 11/18/17, Disp: 90 Tab, Rfl: 3  •  Multiple Vitamins-Minerals (MULTIVITAMIN ADULT PO), Take 1 Tab by mouth every day., Disp: , Rfl:   •  clopidogrel (PLAVIX) 75 MG Tab, TAKE 1 TAB BY MOUTH EVERY DAY., Disp: 90 Tab, Rfl: 3  •  Cyanocobalamin (VITAMIN B-12) 1000 MCG Tab, Take 1,000 mcg by mouth every day., Disp: , Rfl:   •  Calcium 600 MG Tab, Take 1 Tab by mouth every day., Disp: , Rfl:     Labs: Reviewed as above     Physical Examination:  Vital signs: /70 (BP Location: Left arm, Patient Position: Sitting, BP Cuff Size: Adult)   Pulse 67   Resp 16   Ht 1.727 m (5' 8\")   Wt 88.5 kg (195 lb)   SpO2 100%   BMI 29.65 kg/m²  Body mass index is 29.65 kg/m².  General: No apparent distress, cooperative  Eyes: No scleral icterus, no discharge, normal eyelids  Neck: No abnormal masses on inspection, healed thyroid incision no masses and/or lymphadenopathy   Resp: Normal effort, clear to auscultation bilaterally  CVS: Regular rate and rhythm, S1 S2 normal, no murmur  Extremities: No lower extremity edema  Abdomen: abdominal obesity present, soft nontender  Musculoskeletal: Normal digits and nails  Skin: No rash on visible skin  Psych: Alert and oriented, normal mood and affect, intact memory and able to make informed decisions.    Assessment and Plan:  1. History of papillary adenocarcinoma of thyroid  Stable but chronic we will continue to follow ultrasounds and thyroglobulin's.  Reviewed labs from January and February 2019.  Thyroglobulin levels are undetectable.  - US-SOFT TISSUES OF HEAD - NECK; Future    2. Mixed hyperlipidemia  Chronic stable condition managed with current medical regimen continue statin    3. Essential hypertension  Chronic stable condition managed with current medical regimen continue ARB and beta-blocker.  Patient is scheduled with cardiology will continue to follow    4. " Post-operative hypothyroidism  Reviewed labs from January and February 2019.  He is slightly over suppressed again would like to keep his TSH between 0.1-0.5 ng/mL secondary to his history of thyroid cancer.    Decrease Synthroid to 150 mcg 6 days a week and half tablet on day #7 patient is agreeable to this plan.  - TSH; Future  - FREE THYROXINE; Future  - T3 FREE; Future  - TRIIDOTHYRONINE; Future      Return in about 2 months (around 5/27/2019).    Thank you for allowing me to participate in the care of this patient.  If you have any questions or concerns please do not hesitate to contact me.    Priyanka Sandhu P.A.-C.    CC:   Libra Washington M.D.    This note was created using voice recognition software (Dragon). The accuracy of the dictation is limited by the abilities of the software. I have reviewed the note prior to signing, however some errors in grammar and context are still possible. If you have any questions related to this note please do not hesitate to contact our office.

## 2019-04-26 ENCOUNTER — HOSPITAL ENCOUNTER (OUTPATIENT)
Dept: LAB | Facility: MEDICAL CENTER | Age: 74
End: 2019-04-26
Attending: SPECIALIST
Payer: MEDICARE

## 2019-04-26 LAB
ANION GAP SERPL CALC-SCNC: 10 MMOL/L (ref 0–11.9)
BUN SERPL-MCNC: 20 MG/DL (ref 8–22)
CALCIUM SERPL-MCNC: 9.1 MG/DL (ref 8.5–10.5)
CHLORIDE SERPL-SCNC: 106 MMOL/L (ref 96–112)
CO2 SERPL-SCNC: 28 MMOL/L (ref 20–33)
CREAT SERPL-MCNC: 0.89 MG/DL (ref 0.5–1.4)
GLUCOSE SERPL-MCNC: 99 MG/DL (ref 65–99)
POTASSIUM SERPL-SCNC: 3.6 MMOL/L (ref 3.6–5.5)
SODIUM SERPL-SCNC: 144 MMOL/L (ref 135–145)
T4 FREE SERPL-MCNC: 1.25 NG/DL (ref 0.53–1.43)
TSH SERPL DL<=0.005 MIU/L-ACNC: 0.02 UIU/ML (ref 0.38–5.33)

## 2019-04-26 PROCEDURE — 36415 COLL VENOUS BLD VENIPUNCTURE: CPT

## 2019-04-26 PROCEDURE — 84439 ASSAY OF FREE THYROXINE: CPT

## 2019-04-26 PROCEDURE — 84443 ASSAY THYROID STIM HORMONE: CPT

## 2019-04-26 PROCEDURE — 80048 BASIC METABOLIC PNL TOTAL CA: CPT

## 2019-07-05 ENCOUNTER — PATIENT OUTREACH (OUTPATIENT)
Dept: HEALTH INFORMATION MANAGEMENT | Facility: OTHER | Age: 74
End: 2019-07-05

## 2019-07-18 ENCOUNTER — TELEPHONE (OUTPATIENT)
Dept: MEDICAL GROUP | Age: 74
End: 2019-07-18

## 2019-07-18 NOTE — TELEPHONE ENCOUNTER
ESTABLISHED PATIENT PRE-VISIT PLANNING     Patient was contacted to complete PVP.     Note: Patient will not be contacted if there is no indication to call.     1.  Reviewed notes from the last few office visits within the medical group: Yes    2.  If any orders were placed at last visit or intended to be done for this visit (i.e. 6 mos follow-up), do we have Results/Consult Notes?        •  Labs - Labs ordered, NOT completed. Patient advised to complete prior to next appointment.   Note: If patient appointment is for lab review and patient did not complete labs, check with provider if OK to reschedule patient until labs completed.       •  Imaging - Imaging was not ordered at last office visit.       •  Referrals - No referrals were ordered at last office visit.    3. Is this appointment scheduled as a Hospital Follow-Up? No    4.  Immunizations were updated in Epic using WebIZ?: Epic matches WebIZ       •  Web Iz Recommendations: SHINGRIX (Shingles)    5.  Patient is due for the following Health Maintenance Topics:   Health Maintenance Due   Topic Date Due   • Annual Wellness Visit  1945   • HEPATITIS C SCREENING  1945   • COLONOSCOPY  08/02/2010   • IMM ZOSTER VACCINES (2 of 3) 01/20/2016           6. Orders for overdue Health Maintenance topics pended in Pre-Charting? N\A    7.  AHA (MDX) form printed for Provider? No, already completed    8.  Patient was NOT informed to arrive 15 min prior to their scheduled appointment and bring in their medication bottles.

## 2019-07-20 ENCOUNTER — HOSPITAL ENCOUNTER (OUTPATIENT)
Dept: LAB | Facility: MEDICAL CENTER | Age: 74
End: 2019-07-20
Attending: INTERNAL MEDICINE
Payer: MEDICARE

## 2019-07-20 DIAGNOSIS — R79.89 LOW SERUM PARATHYROID HORMONE (PTH): ICD-10-CM

## 2019-07-20 DIAGNOSIS — I10 ESSENTIAL HYPERTENSION: ICD-10-CM

## 2019-07-20 DIAGNOSIS — E78.2 MIXED HYPERLIPIDEMIA: ICD-10-CM

## 2019-07-20 LAB
ALBUMIN SERPL BCP-MCNC: 4.2 G/DL (ref 3.2–4.9)
ALBUMIN/GLOB SERPL: 1.4 G/DL
ALP SERPL-CCNC: 45 U/L (ref 30–99)
ALT SERPL-CCNC: 27 U/L (ref 2–50)
ANION GAP SERPL CALC-SCNC: 8 MMOL/L (ref 0–11.9)
AST SERPL-CCNC: 21 U/L (ref 12–45)
BASOPHILS # BLD AUTO: 1 % (ref 0–1.8)
BASOPHILS # BLD: 0.06 K/UL (ref 0–0.12)
BILIRUB SERPL-MCNC: 0.6 MG/DL (ref 0.1–1.5)
BUN SERPL-MCNC: 19 MG/DL (ref 8–22)
CALCIUM SERPL-MCNC: 8.3 MG/DL (ref 8.5–10.5)
CHLORIDE SERPL-SCNC: 107 MMOL/L (ref 96–112)
CHOLEST SERPL-MCNC: 145 MG/DL (ref 100–199)
CO2 SERPL-SCNC: 25 MMOL/L (ref 20–33)
CREAT SERPL-MCNC: 0.87 MG/DL (ref 0.5–1.4)
EOSINOPHIL # BLD AUTO: 0.08 K/UL (ref 0–0.51)
EOSINOPHIL NFR BLD: 1.3 % (ref 0–6.9)
ERYTHROCYTE [DISTWIDTH] IN BLOOD BY AUTOMATED COUNT: 46.1 FL (ref 35.9–50)
FASTING STATUS PATIENT QL REPORTED: NORMAL
GLOBULIN SER CALC-MCNC: 3 G/DL (ref 1.9–3.5)
GLUCOSE SERPL-MCNC: 92 MG/DL (ref 65–99)
HCT VFR BLD AUTO: 43.6 % (ref 42–52)
HDLC SERPL-MCNC: 53 MG/DL
HGB BLD-MCNC: 14 G/DL (ref 14–18)
IMM GRANULOCYTES # BLD AUTO: 0.02 K/UL (ref 0–0.11)
IMM GRANULOCYTES NFR BLD AUTO: 0.3 % (ref 0–0.9)
LDLC SERPL CALC-MCNC: 76 MG/DL
LYMPHOCYTES # BLD AUTO: 1.54 K/UL (ref 1–4.8)
LYMPHOCYTES NFR BLD: 25 % (ref 22–41)
MCH RBC QN AUTO: 32.4 PG (ref 27–33)
MCHC RBC AUTO-ENTMCNC: 32.1 G/DL (ref 33.7–35.3)
MCV RBC AUTO: 100.9 FL (ref 81.4–97.8)
MONOCYTES # BLD AUTO: 0.4 K/UL (ref 0–0.85)
MONOCYTES NFR BLD AUTO: 6.5 % (ref 0–13.4)
NEUTROPHILS # BLD AUTO: 4.07 K/UL (ref 1.82–7.42)
NEUTROPHILS NFR BLD: 65.9 % (ref 44–72)
NRBC # BLD AUTO: 0 K/UL
NRBC BLD-RTO: 0 /100 WBC
PLATELET # BLD AUTO: 211 K/UL (ref 164–446)
PMV BLD AUTO: 11.2 FL (ref 9–12.9)
POTASSIUM SERPL-SCNC: 3.7 MMOL/L (ref 3.6–5.5)
PROT SERPL-MCNC: 7.2 G/DL (ref 6–8.2)
PTH-INTACT SERPL-MCNC: 47.7 PG/ML (ref 14–72)
RBC # BLD AUTO: 4.32 M/UL (ref 4.7–6.1)
SODIUM SERPL-SCNC: 140 MMOL/L (ref 135–145)
TRIGL SERPL-MCNC: 78 MG/DL (ref 0–149)
WBC # BLD AUTO: 6.2 K/UL (ref 4.8–10.8)

## 2019-07-20 PROCEDURE — 83970 ASSAY OF PARATHORMONE: CPT

## 2019-07-20 PROCEDURE — 85025 COMPLETE CBC W/AUTO DIFF WBC: CPT

## 2019-07-20 PROCEDURE — 36415 COLL VENOUS BLD VENIPUNCTURE: CPT

## 2019-07-20 PROCEDURE — 80053 COMPREHEN METABOLIC PANEL: CPT

## 2019-07-20 PROCEDURE — 80061 LIPID PANEL: CPT

## 2019-07-23 ENCOUNTER — OFFICE VISIT (OUTPATIENT)
Dept: MEDICAL GROUP | Age: 74
End: 2019-07-23
Payer: MEDICARE

## 2019-07-23 VITALS
TEMPERATURE: 98 F | WEIGHT: 201.8 LBS | BODY MASS INDEX: 30.58 KG/M2 | DIASTOLIC BLOOD PRESSURE: 66 MMHG | SYSTOLIC BLOOD PRESSURE: 132 MMHG | HEART RATE: 61 BPM | OXYGEN SATURATION: 95 % | HEIGHT: 68 IN

## 2019-07-23 DIAGNOSIS — E89.0 POSTOPERATIVE HYPOTHYROIDISM: ICD-10-CM

## 2019-07-23 DIAGNOSIS — I25.10 CORONARY ARTERY DISEASE INVOLVING NATIVE CORONARY ARTERY OF NATIVE HEART WITHOUT ANGINA PECTORIS: ICD-10-CM

## 2019-07-23 DIAGNOSIS — I10 ESSENTIAL HYPERTENSION: ICD-10-CM

## 2019-07-23 DIAGNOSIS — Z12.11 SCREENING FOR COLON CANCER: ICD-10-CM

## 2019-07-23 DIAGNOSIS — Z11.59 NEED FOR HEPATITIS C SCREENING TEST: ICD-10-CM

## 2019-07-23 DIAGNOSIS — E78.2 MIXED HYPERLIPIDEMIA: ICD-10-CM

## 2019-07-23 PROCEDURE — 99214 OFFICE O/P EST MOD 30 MIN: CPT | Performed by: INTERNAL MEDICINE

## 2019-07-23 RX ORDER — LEVOTHYROXINE SODIUM 137 UG/1
137 TABLET ORAL
Refills: 12 | COMMUNITY
Start: 2019-06-04 | End: 2019-10-08 | Stop reason: SDUPTHER

## 2019-07-23 ASSESSMENT — ACTIVITIES OF DAILY LIVING (ADL): BATHING_REQUIRES_ASSISTANCE: 0

## 2019-07-23 ASSESSMENT — PAIN SCALES - GENERAL: PAINLEVEL: NO PAIN

## 2019-07-23 ASSESSMENT — ENCOUNTER SYMPTOMS: GENERAL WELL-BEING: EXCELLENT

## 2019-07-23 ASSESSMENT — PATIENT HEALTH QUESTIONNAIRE - PHQ9: CLINICAL INTERPRETATION OF PHQ2 SCORE: 0

## 2019-07-23 NOTE — PROGRESS NOTES
Subjective:   Joshua Becerra is a 74 y.o. male here today for evaluation and management of:    Essential hypertension  Patient has a history of chronic hypertension and is taking Metoprolol 25 mg twice daily and losartan 100 mg daily. No medication side effects were reported. He reportedly monitors his blood pressure regularly at home. Blood pressure is stable today at 132/66.  He reports following a low sodium diet and deny any related complaints including chronic cough, chest pain, headaches or dizziness.     Mixed hyperlipidemia  Chronic, stable history of hyperlipidemia. Currently taking Atorvastatin 10 mg once daily as directed. No medication side effects were reported including myalgias or abdominal pain. He attempts to follow a high protein, low carbohydrate diet but is not exercising regularly. He is not taking a daily Aspirin. No acute complaints of dizziness, claudication or chest pain. Lipid panel dated 07/20/19 is within normal limits. Liver enzymes are normal.    I reviewed and discussed the following labs with the patient.   Results for JOSHUA BECERRA (MRN [id number]) as of 7/23/2019 14:16   Ref. Range 7/20/2019 08:23   Cholesterol,Tot Latest Ref Range: 100 - 199 mg/dL 145   Triglycerides Latest Ref Range: 0 - 149 mg/dL 78   HDL Latest Ref Range: >=40 mg/dL 53   LDL Latest Ref Range: <100 mg/dL 76       Coronary artery disease involving native coronary artery of native heart without angina pectoris  Status post cardiac triple bypass surgery with left internal mammary artery graft to the left anterior descending artery, saphenous vein graft to diagonal branch, saphenous vein graft to distal obtuse marginal branch by Dr. Richardson in 2014. He is now followed by Dr. Garcia, Cardiology. No acute complaints or concerns.    Postoperative hypothyroidism  Patient has history of papillary adenocarcinoma of the thyroid and he had thyroidectomy on 10/23/2018.  He has postoperative hypothyroidism  which is treated with Levothyroxine. His dosage has been modified several times. Most recent dosage change from 150 mcg to 137 mcg once daily in the morning. He was previously followed by Priyanka Sandhu PA-C, Endocrinology, and plans to establish with Dr. Cardona in October 2019. TSH was last low at 0.02 and a normal free T4 on 04/26/19. His calcium is low at 8.3 but states he takes a daily supplement.    I reviewed and discussed the following labs with the patient.   Results for JESENIA RODRÍGUEZ (MRN [id number]) as of 7/23/2019 14:16   Ref. Range 4/26/2019 13:56   TSH Latest Ref Range: 0.380 - 5.330 uIU/mL 0.020 (L)   Free T-4 Latest Ref Range: 0.53 - 1.43 ng/dL 1.25         Current medicines (including changes today)  Current Outpatient Prescriptions   Medication Sig Dispense Refill   • levothyroxine (SYNTHROID) 137 MCG Tab Take 137 mcg by mouth every day.  12   • losartan (COZAAR) 100 MG Tab TAKE 1 TABLET BY MOUTH EVERY DAY 90 Tab 3   • metoprolol (LOPRESSOR) 25 MG Tab TAKE 1 TAB BY MOUTH 2 TIMES A DAY. 180 Tab 3   • atorvastatin (LIPITOR) 10 MG Tab TAKE 1 TAB BY MOUTH EVERY DAY.* DNFU 11/18/17 90 Tab 3   • Multiple Vitamins-Minerals (MULTIVITAMIN ADULT PO) Take 1 Tab by mouth every day.     • clopidogrel (PLAVIX) 75 MG Tab TAKE 1 TAB BY MOUTH EVERY DAY. 90 Tab 3   • Cyanocobalamin (VITAMIN B-12) 1000 MCG Tab Take 1,000 mcg by mouth every day.     • Calcium 600 MG Tab Take 1 Tab by mouth every day.       No current facility-administered medications for this visit.      He  has a past medical history of Aortic stenosis; CAD (coronary artery disease); Cancer (HCC); Carotid artery disease (HCC); Disorder of thyroid (10/18/2018); High cholesterol; Hyperlipidemia; Hypertension; Unspecified cataract; Unspecified urinary incontinence (10/18/2018); and Valvular heart disease.    ROS   Negative for chest pain, shortness of breath or abdominal pain. See HPI for further details.       Objective:     /66 (BP  "Location: Right arm, Patient Position: Sitting, BP Cuff Size: Adult)   Pulse 61   Temp 36.7 °C (98 °F) (Temporal)   Ht 1.727 m (5' 8\")   Wt 91.5 kg (201 lb 12.8 oz)   SpO2 95%  Body mass index is 30.68 kg/m².     Physical Exam:  General: Alert, oriented and no acute distress.  Eye contact is good, speech goal directed, affect calm  HEENT: conjunctiva non-injected, sclera non-icteric.  Oral mucous membranes pink and moist with no lesions.  Pinna normal.   Neck No supraclavicular, submandibular, submental lymphadenopathy or masses in the neck or supraclavicular regions.  Lungs: Normal respiratory effort, clear to auscultation bilaterally with good excursion.  CV: regular rate and rhythm. No murmurs.   Abdomen: soft, non distended, nontender, bowel sound normal.  Ext: no edema, color normal, vascularity normal, temperature normal      Assessment and Plan:   The following treatment plan was discussed     1. Essential hypertension  - Chronic, stable history. Blood pressure today was stable at 132/66. Under good control with current medication regimen: Metoprolol 25 mg twice daily and losartan 100 mg daily. No changes in dosage today. Continue to monitor blood pressure at home. Follow a low sodium diet.   - He was encouraged to increase his water intake and begin to exercise regularly.  - Repeat labs in 1-2 weeks prior to their next appointment.   - CBC WITH DIFFERENTIAL; Future  - Comp Metabolic Panel; Future    2. Mixed hyperlipidemia  - Well-controlled. Continue current regimen of Atorvastatin 10 mg once daily.  Advised to keep LDL cholesterol less than 70.  - Cardiac risk was reviewed with the patient and will consult with Cardiology to determine whether he should increase his statin dosage.  - Obtained and reviewed lipid panel dated 07/20/19 which is within normal limits. Liver enzymes normal.  - Recommended they follow low fat, low carbohydrate and high fiber diet. Additionally, patient was asked to start " exercising regularly including frequent cardio.  - Recheck lab 1-2 weeks before next follow up visit.   - Comp Metabolic Panel; Future  - Lipid Profile; Future    3. Coronary artery disease involving native coronary artery of native heart without angina pectoris  - Chronic, stable history of CAD. Followed by Dr. Garcia, Cardiology.  - Status post cardiac triple bypass surgery with left internal mammary artery graft to the left anterior descending artery, saphenous vein graft to diagonal branch, saphenous vein graft to distal obtuse marginal branch by Dr. Richardson in 2014.    4. Postoperative hypothyroidism  - Chronic, stable history. Condition is managed by Endocrinology. Previously followed by Priyanka Sandhu PA-C and plans to establish with Dr. Cardona in October 2019.   - TSH was last low at 0.02 and a normal free T4 on 04/26/19. His calcium is low at 8.3 but states he takes a daily supplement.  - Plan to continue current dosage of Levothyroxine 137 mcg once daily in the morning.  -  Recheck lab 1-2 weeks before next follow up visit.   - THYROID PEROXIDASE  (TPO) AB; Future  - ANTITHYROGLOBULIN AB; Future  - TSH; Future  - FREE THYROXINE; Future    5. Need for hepatitis C screening test  - Patient agreed to complete hepatitis C screening.  - HEP C VIRUS ANTIBODY; Future    6. Screening for colon cancer  - Last colonoscopy was in 2005 and was normal. No polyps. He has a significant family history however. He is over due for a colonoscopy and will be referred [facility].  - REFERRAL TO GASTROENTEROLOGY       Health Maintenance:  He is due for the zoster vaccine and was encouraged to check the availability at his pharmacy.      Follow up: Return in about 6 months (around 1/23/2020), or if symptoms worsen or fail to improve, for Hypertension, Hyperlipidemia, Hypothyroid, Lab review. Repeat labs 1-2 weeks prior to his next appointment.       Please note that this dictation was created using voice  recognition software. I have made every reasonable attempt to correct obvious errors, but I expect that there may have unintended errors in text, spelling, punctuation, or grammar that I did not discover.    I, Brigid Mejia (Scribe), am scribing for, and in the presence of, Libra Washington M.D.    Electronically signed by: Brigid Mejia (Scribe), 7/23/2019    I, Libra aWshington M.D., personally performed the services described in this documentation, as scribed by Brigid Mejia in my presence, and it is both accurate and complete.

## 2019-08-07 DIAGNOSIS — I65.23 BILATERAL CAROTID ARTERY STENOSIS: ICD-10-CM

## 2019-08-07 RX ORDER — CLOPIDOGREL BISULFATE 75 MG/1
TABLET ORAL
Qty: 90 TAB | Refills: 1 | Status: SHIPPED | OUTPATIENT
Start: 2019-08-07 | End: 2020-02-03

## 2019-08-25 DIAGNOSIS — E78.2 MIXED HYPERLIPIDEMIA: ICD-10-CM

## 2019-08-27 RX ORDER — ATORVASTATIN CALCIUM 10 MG/1
TABLET, FILM COATED ORAL
Qty: 100 TAB | Refills: 3 | Status: SHIPPED | OUTPATIENT
Start: 2019-08-27 | End: 2020-09-28

## 2019-10-01 ENCOUNTER — IMMUNIZATION (OUTPATIENT)
Dept: SOCIAL WORK | Facility: CLINIC | Age: 74
End: 2019-10-01
Payer: MEDICARE

## 2019-10-01 DIAGNOSIS — Z23 NEED FOR VACCINATION: ICD-10-CM

## 2019-10-01 PROCEDURE — 90662 IIV NO PRSV INCREASED AG IM: CPT | Performed by: REGISTERED NURSE

## 2019-10-01 PROCEDURE — G0008 ADMIN INFLUENZA VIRUS VAC: HCPCS | Performed by: REGISTERED NURSE

## 2019-10-02 ENCOUNTER — HOSPITAL ENCOUNTER (OUTPATIENT)
Dept: LAB | Facility: MEDICAL CENTER | Age: 74
End: 2019-10-02
Attending: INTERNAL MEDICINE
Payer: MEDICARE

## 2019-10-02 DIAGNOSIS — E89.0 POSTOPERATIVE HYPOTHYROIDISM: ICD-10-CM

## 2019-10-02 LAB
T3 SERPL-MCNC: 84.9 NG/DL (ref 60–181)
T4 FREE SERPL-MCNC: 1.11 NG/DL (ref 0.53–1.43)
THYROPEROXIDASE AB SERPL-ACNC: 1.1 IU/ML (ref 0–9)
TSH SERPL DL<=0.005 MIU/L-ACNC: 0.19 UIU/ML (ref 0.38–5.33)

## 2019-10-02 PROCEDURE — 86376 MICROSOMAL ANTIBODY EACH: CPT

## 2019-10-02 PROCEDURE — 86800 THYROGLOBULIN ANTIBODY: CPT

## 2019-10-02 PROCEDURE — 84480 ASSAY TRIIODOTHYRONINE (T3): CPT

## 2019-10-02 PROCEDURE — 36415 COLL VENOUS BLD VENIPUNCTURE: CPT

## 2019-10-02 PROCEDURE — 84439 ASSAY OF FREE THYROXINE: CPT

## 2019-10-02 PROCEDURE — 84443 ASSAY THYROID STIM HORMONE: CPT

## 2019-10-03 ENCOUNTER — PATIENT MESSAGE (OUTPATIENT)
Dept: CARDIOLOGY | Facility: MEDICAL CENTER | Age: 74
End: 2019-10-03

## 2019-10-04 LAB — THYROGLOB AB SERPL-ACNC: <0.9 IU/ML (ref 0–4)

## 2019-10-04 NOTE — PATIENT COMMUNICATION
Pt walked into clinic to request carotid ultrasound because he thought he was to have one routinely every year. Explained to pt that the carotid stenosis was stable in 2017 and 2018 imaging so no new testing was ordered for this year unless new symptoms occur. Possible related symptoms were discussed. He denied any visual changes or lightheadedness etc. But does report occasional headaches that feel like someone has punched him in the back of the head. They occur on either the right or left side of his head and last about 5-20 minutes with no other associated symptoms. He said they usually go away on there own but sometimes he takes a tylenol to help relieve it. This has happened a total of 5-8 times in the last year. They have happened at work and at home.     To CHERY: should pt have a carotid duplex prior to upcoming appt 10/18?

## 2019-10-07 NOTE — PROGRESS NOTES
Endocrinology Clinic Progress Note  PCP: Libra Washington M.D.    HPI:  Joshua Becerra is a 74 y.o. old patient who comes in today for review of endocrine problems.    Papillary thyroid:  status post total thyroidectomy on 10/23/2018.   Status post 100 mCi 1-131   · posttreatment whole-body scan 14 days post administration of 101.1 mCi I-131.  There is residual activity seen within the thyroid bed.  There is normal salivary gland activity.  There is no evidence of metastatic disease.    Postoperative Hypothyroidism:  Currently taking Levothyroxine 137 mcg daily.    Ref. Range 10/2/2019 14:10   TSH Latest Ref Range: 0.380 - 5.330 uIU/mL 0.190 (L)   Free T-4 Latest Ref Range: 0.53 - 1.43 ng/dL 1.11   T3 Latest Ref Range: 60.0 - 181.0 ng/dL 84.9   Microsomal -Tpo- Abs Latest Ref Range: 0.0 - 9.0 IU/mL 1.1   Anti-Thyroglobulin Latest Ref Range: 0.0 - 4.0 IU/mL <0.9     Hypertension:  Currently taking Losartan 100 mg daily.  He states he had open heart surgery in 2014.    Hyperlipidemia:  Currently taking Lipitor 10 mg daily.    ROS:  Constitutional: No unintentional weight loss  Endo: Denies excessive thirst or frequent urination  All other systems were reviewed and were negative.    Past Medical History:  Patient Active Problem List    Diagnosis Date Noted   • S/P CABG (coronary artery bypass graft) 12/15/2014     Priority: High   • CAD (coronary artery disease) 12/14/2014     Priority: High   • Aortic stenosis 02/10/2015     Priority: Medium   • Mixed hyperlipidemia 12/12/2014     Priority: Low   • Essential hypertension 12/01/2014     Priority: Low   • Postoperative hypothyroidism 07/23/2019   • History of papillary adenocarcinoma of thyroid 09/06/2018   • Arthritis of right knee 09/16/2016   • Bilateral low back pain without sciatica 09/16/2016   • Nocturnal enuresis 10/28/2015   • Other and combined forms of senile cataract 06/04/2015   • Stenosis of right carotid artery 12/14/2014   • H/O prostatectomy  "09/03/2013   • History of prostate cancer 09/03/2013       Medications:    Current Outpatient Medications:   •  levothyroxine (SYNTHROID) 137 MCG Tab, Take 1 Tab by mouth every day., Disp: 90 Tab, Rfl: 3  •  atorvastatin (LIPITOR) 10 MG Tab, TAKE 1 TAB BY MOUTH EVERY DAY.* DNFU 11/18/17, Disp: 100 Tab, Rfl: 3  •  clopidogrel (PLAVIX) 75 MG Tab, TAKE 1 TABLET BY MOUTH EVERY DAY, Disp: 90 Tab, Rfl: 1  •  losartan (COZAAR) 100 MG Tab, TAKE 1 TABLET BY MOUTH EVERY DAY, Disp: 90 Tab, Rfl: 3  •  metoprolol (LOPRESSOR) 25 MG Tab, TAKE 1 TAB BY MOUTH 2 TIMES A DAY., Disp: 180 Tab, Rfl: 3  •  Multiple Vitamins-Minerals (MULTIVITAMIN ADULT PO), Take 1 Tab by mouth every day., Disp: , Rfl:   •  Cyanocobalamin (VITAMIN B-12) 1000 MCG Tab, Take 1,000 mcg by mouth every day., Disp: , Rfl:   •  Calcium 600 MG Tab, Take 1 Tab by mouth every day., Disp: , Rfl:     Labs: Reviewed    Physical Examination:  Vital signs: /60   Pulse 62   Ht 1.727 m (5' 8\")   Wt 91.6 kg (202 lb)   SpO2 95%   BMI 30.71 kg/m²  Body mass index is 30.71 kg/m². Patient's body mass index is 30.71 kg/m². Exercise and nutrition counseling were performed at this visit.  General: No apparent distress, cooperative, appears younger than stated age  Eyes: No scleral icterus, no discharge, normal eyelids  Neck: No abnormal masses on inspection, scar from previous thyroidectomy present  Resp: Normal effort, clear to auscultation bilaterally  CVS: Regular rate and rhythm, S1 S2 normal, no murmur  Extremities: No lower extremity edema  Abdomen: abdominal obesity present  Musculoskeletal: Normal digits and nails  Skin: No rash on visible skin  Psych: Alert and oriented, normal mood and affect, intact memory and able to make informed decisions.    Assessment and Plan:    1. History of papillary adenocarcinoma of thyroid  Got good treatment; no evidence of recurrence; will monitor closely.     2. Post-operative hypothyroidism  Continue current dose of " levothyroxine.     3. Mixed hyperlipidemia  Continue statin.    4. Essential hypertension  Controlled.     Return in about 4 months (around 2/8/2020).    Thank you for allowing me to participate in the care of this patient.    Ernie Cardona M.D.  This note was scribed by Crystal Hall RN CDE  CC:   Libra Washington M.D.    This note was created using voice recognition software (Dragon). The accuracy of the dictation is limited by the abilities of the software. I have reviewed the note prior to signing, however some errors in grammar and context are still possible. If you have any questions related to this note please do not hesitate to contact our office.

## 2019-10-08 ENCOUNTER — OFFICE VISIT (OUTPATIENT)
Dept: ENDOCRINOLOGY | Facility: MEDICAL CENTER | Age: 74
End: 2019-10-08
Payer: MEDICARE

## 2019-10-08 VITALS
OXYGEN SATURATION: 95 % | BODY MASS INDEX: 30.62 KG/M2 | HEART RATE: 62 BPM | SYSTOLIC BLOOD PRESSURE: 108 MMHG | HEIGHT: 68 IN | WEIGHT: 202 LBS | DIASTOLIC BLOOD PRESSURE: 60 MMHG

## 2019-10-08 DIAGNOSIS — E55.9 VITAMIN D DEFICIENCY: ICD-10-CM

## 2019-10-08 DIAGNOSIS — E83.51 HYPOCALCEMIA: ICD-10-CM

## 2019-10-08 DIAGNOSIS — E78.2 MIXED HYPERLIPIDEMIA: ICD-10-CM

## 2019-10-08 DIAGNOSIS — E53.8 VITAMIN B 12 DEFICIENCY: ICD-10-CM

## 2019-10-08 DIAGNOSIS — E89.0 POST-OPERATIVE HYPOTHYROIDISM: ICD-10-CM

## 2019-10-08 DIAGNOSIS — Z85.850 HISTORY OF PAPILLARY ADENOCARCINOMA OF THYROID: ICD-10-CM

## 2019-10-08 DIAGNOSIS — I10 ESSENTIAL HYPERTENSION: ICD-10-CM

## 2019-10-08 PROCEDURE — 99214 OFFICE O/P EST MOD 30 MIN: CPT | Performed by: INTERNAL MEDICINE

## 2019-10-08 RX ORDER — LEVOTHYROXINE SODIUM 137 UG/1
137 TABLET ORAL
Qty: 90 TAB | Refills: 3 | Status: SHIPPED | OUTPATIENT
Start: 2019-10-08 | End: 2020-09-28

## 2019-10-08 NOTE — PATIENT COMMUNICATION
MANI Frost.  You 45 minutes ago (1:49 PM)      Carotid duplex stable in '17 and '18 with only mild disease. Not clinically warranted to re-do with HA. Recommend PCP review of HA and FU with them. No carotid for 2-5 years per guidelines. SC      Responded to pt in Mychart with SC recommendations.

## 2019-10-18 ENCOUNTER — OFFICE VISIT (OUTPATIENT)
Dept: CARDIOLOGY | Facility: MEDICAL CENTER | Age: 74
End: 2019-10-18
Payer: MEDICARE

## 2019-10-18 VITALS
BODY MASS INDEX: 30.84 KG/M2 | HEIGHT: 68 IN | OXYGEN SATURATION: 95 % | SYSTOLIC BLOOD PRESSURE: 130 MMHG | HEART RATE: 60 BPM | DIASTOLIC BLOOD PRESSURE: 78 MMHG | WEIGHT: 203.48 LBS

## 2019-10-18 DIAGNOSIS — I65.23 BILATERAL CAROTID ARTERY STENOSIS: ICD-10-CM

## 2019-10-18 DIAGNOSIS — I10 ESSENTIAL HYPERTENSION: ICD-10-CM

## 2019-10-18 DIAGNOSIS — I25.10 CORONARY ARTERY DISEASE INVOLVING NATIVE CORONARY ARTERY OF NATIVE HEART WITHOUT ANGINA PECTORIS: ICD-10-CM

## 2019-10-18 DIAGNOSIS — Z95.1 S/P CABG (CORONARY ARTERY BYPASS GRAFT): ICD-10-CM

## 2019-10-18 DIAGNOSIS — E78.2 MIXED HYPERLIPIDEMIA: ICD-10-CM

## 2019-10-18 PROCEDURE — 99214 OFFICE O/P EST MOD 30 MIN: CPT | Performed by: INTERNAL MEDICINE

## 2019-10-18 ASSESSMENT — ENCOUNTER SYMPTOMS
DOUBLE VISION: 0
FEVER: 0
FOCAL WEAKNESS: 0
CLAUDICATION: 0
BRUISES/BLEEDS EASILY: 0
CHILLS: 0
NAUSEA: 0
WEIGHT LOSS: 0
GASTROINTESTINAL NEGATIVE: 1
NEUROLOGICAL NEGATIVE: 1
MUSCULOSKELETAL NEGATIVE: 1
HEADACHES: 0
MYALGIAS: 0
SHORTNESS OF BREATH: 0
CONSTITUTIONAL NEGATIVE: 1
EYES NEGATIVE: 1
COUGH: 0
DIZZINESS: 0
WEAKNESS: 0
ABDOMINAL PAIN: 0
PSYCHIATRIC NEGATIVE: 1
DEPRESSION: 0
PALPITATIONS: 0
RESPIRATORY NEGATIVE: 1
VOMITING: 0
CARDIOVASCULAR NEGATIVE: 1
NERVOUS/ANXIOUS: 0
BLURRED VISION: 0

## 2019-10-18 NOTE — PROGRESS NOTES
Chief Complaint   Patient presents with   • Coronary Artery Disease       Subjective:   Ed Ed Becerra is a 74 y.o. male who presents today for annual follow up of coronary artery disease with prior coronary artery bypass graft surgery.    Since the patient's last visit on 09/13/19, he has been doing well clinically. He denies chest pain, shortness of breath, palpitations, nausea/vomiting or diaphoresis. He keeps active walking with his grandchildren frequently. Hew also has been on his exercise bike 20 minutes daily.    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/18/2018   • High cholesterol    • Hyperlipidemia    • Hypertension    • Unspecified cataract    • Unspecified urinary incontinence 10/18/2018   • Valvular heart disease      Past Surgical History:   Procedure Laterality Date   • THYROIDECTOMY TOTAL  10/23/2018    Procedure: THYROIDECTOMY TOTAL;  Surgeon: Angel Adams M.D.;  Location: [facility];  Service: Ent   • NECK DISSECTION  10/23/2018    Procedure: NECK DISSECTION - POSSIBLE LIMITED CENTRAL;  Surgeon: Angel Adams M.D.;  Location: [facility];  Service: Ent   • CATARACT PHACO WITH IOL Left 7/2/2015    Procedure: CATARACT PHACO WITH IOL;  Surgeon: Matty Augustin M.D.;  Location: [facility];  Service:    • CATARACT PHACO WITH IOL Right 6/4/2015    Procedure: CATARACT PHACO WITH IOL;  Surgeon: Matty Augustin M.D.;  Location: [facility];  Service:    • MULTIPLE CORONARY ARTERY BYPASS ENDO VEIN HARVEST  12/15/2014    Performed by Gustavo Richardson M.D. at [facility]   • PROSTATECTOMY, RADICAL RETRO  12/17/2008     Family History   Problem Relation Age of Onset   • Cancer Sister         colon   • Cancer Brother         prostate   • Heart Disease Mother    •  Arthritis Mother    • Hypertension Mother    • Hyperlipidemia Mother    • Stroke Mother    • Heart Disease Father    • Hypertension Father    • Hyperlipidemia Father    • Stroke Father    • Arthritis Brother    • Hyperlipidemia Brother    • Diabetes Neg Hx      Social History     Socioeconomic History   • Marital status:      Spouse name: Not on file   • Number of children: Not on file   • Years of education: Not on file   • Highest education level: Not on file   Occupational History   • Not on file   Social Needs   • Financial resource strain: Not on file   • Food insecurity:     Worry: Not on file     Inability: Not on file   • Transportation needs:     Medical: Not on file     Non-medical: Not on file   Tobacco Use   • Smoking status: Never Smoker   • Smokeless tobacco: Never Used   Substance and Sexual Activity   • Alcohol use: No     Alcohol/week: 0.0 oz   • Drug use: No   • Sexual activity: Never     Partners: Female     Comment: s/p prostatectomy   Lifestyle   • Physical activity:     Days per week: Not on file     Minutes per session: Not on file   • Stress: Not on file   Relationships   • Social connections:     Talks on phone: Not on file     Gets together: Not on file     Attends [employer] service: Not on file     Active member of club or organization: Not on file     Attends meetings of clubs or organizations: Not on file     Relationship status: Not on file   • Intimate partner violence:     Fear of current or ex partner: Not on file     Emotionally abused: Not on file     Physically abused: Not on file     Forced sexual activity: Not on file   Other Topics Concern   • Not on file   Social History Narrative   • Not on file     No Known Allergies     (Medications reviewed.)    Outpatient Encounter Medications as of 10/18/2019   Medication Sig Dispense Refill   • levothyroxine (SYNTHROID) 137 MCG Tab Take 1 Tab by mouth every day. 90 Tab 3   • atorvastatin (LIPITOR) 10 MG Tab TAKE 1 TAB BY MOUTH  "EVERY DAY.* DNFU 11/18/17 100 Tab 3   • clopidogrel (PLAVIX) 75 MG Tab TAKE 1 TABLET BY MOUTH EVERY DAY 90 Tab 1   • losartan (COZAAR) 100 MG Tab TAKE 1 TABLET BY MOUTH EVERY DAY 90 Tab 3   • metoprolol (LOPRESSOR) 25 MG Tab TAKE 1 TAB BY MOUTH 2 TIMES A DAY. 180 Tab 3   • Multiple Vitamins-Minerals (MULTIVITAMIN ADULT PO) Take 1 Tab by mouth every day.     • Cyanocobalamin (VITAMIN B-12) 1000 MCG Tab Take 1,000 mcg by mouth every day.     • Calcium 600 MG Tab Take 1 Tab by mouth every day.       No facility-administered encounter medications on file as of 10/18/2019.      Review of Systems   Constitutional: Negative.  Negative for chills, fever, malaise/fatigue and weight loss.   HENT: Negative.  Negative for hearing loss.    Eyes: Negative.  Negative for blurred vision and double vision.   Respiratory: Negative.  Negative for cough and shortness of breath.    Cardiovascular: Negative.  Negative for chest pain, palpitations, claudication and leg swelling.   Gastrointestinal: Negative.  Negative for abdominal pain, nausea and vomiting.   Genitourinary: Negative.  Negative for dysuria and urgency.   Musculoskeletal: Negative.  Negative for joint pain and myalgias.   Skin: Negative.  Negative for itching and rash.   Neurological: Negative.  Negative for dizziness, focal weakness, weakness and headaches.   Endo/Heme/Allergies: Negative.  Does not bruise/bleed easily.   Psychiatric/Behavioral: Negative.  Negative for depression. The patient is not nervous/anxious.         Objective:   /78 (BP Location: Right arm, Patient Position: Sitting, BP Cuff Size: Adult)   Pulse 60   Ht 1.727 m (5' 8\")   Wt 92.3 kg (203 lb 7.8 oz)   SpO2 95%   BMI 30.94 kg/m²     Physical Exam   Constitutional: He is oriented to person, place, and time. He appears well-developed and well-nourished.   HENT:   Head: Normocephalic and atraumatic.   Eyes: EOM are normal.   Neck: No JVD present.   Cardiovascular: Normal rate, regular rhythm " and normal heart sounds.   Pulmonary/Chest: Effort normal and breath sounds normal.   Abdominal: Soft. Bowel sounds are normal.   No hepatosplenomegaly.   Musculoskeletal: Normal range of motion.   Lymphadenopathy:     He has no cervical adenopathy.   Neurological: He is alert and oriented to person, place, and time.   Skin: Skin is warm and dry.   Psychiatric: He has a normal mood and affect.     CARDIAC STUDIES/PROCEDURES:     CARDIAC CATHETERIZATION CONCLUSIONS (12/13/14)  1. Two-vessel coronary artery disease with mid left anterior descending   artery, ostial diagonal branch and proximal circumflex artery stenosis.   2. Normal left ventricular systolic function with ejection fraction of 55%.   3. Mildly elevated left ventricular end diastolic pressure.   4. Mild aortic stenosis.     CAROTID ULTRASOUND (05/17/18)  Probable distal right internal carotid occlusion, chronic. No changes from 2017.  Mild stenosis of the left internal carotid (< 50%).   Flow within both subclavian arteries appears to be within normal limits.   Antegrade flow, bilateral vertebral arteries.   Incidental left-sided thyroid cyst noted.  Follow as indicated clinically.     CAROTID ULTRASOUND (12/13/14)  RIGHT:  Very mild smooth plaque of the common carotids & bifurcation with minimal   plaque in the proximal internal carotid artery. The ICA waveforms are   resistive with low peak systolic & end diastolic velocities which may   suggest distal obstruction or occlusion.  LEFT:  Very mild smooth plaque of the common carotids & bifurcation extending into   the internal carotid. Velocities are consistent with < 50% stenosis of the   internal carotid artery.   Bilateral subclavian and vertebral artery waveforms are antegrade and   waveforms are normal in character and velocity.      CT OF HEAD (12/18/14)  1. Occlusion or near occlusion of the intracranial right internal carotid artery with apparent tiny branch extending to the middle cerebral  artery. The right middle cerebral artery appears to be supplied primarily by the anterior communicating artery.     CT OF NECK (12/18/14)  1. Diffuse narrowing of the right internal carotid artery beginning approximately 2 cm distal to its origin could be due to diffuse atherosclerotic disease or chronic dissection.  2. Occlusion or near occlusion of the intracranial portion of the right internal carotid artery.  3. No evidence of left internal carotid artery stenosis.     ECHOCARDIOGRAM CONCLUSIONS (05/17/18)  Prior echo 5-18-17. Compared to the report of the study done - there has been no significant change.   Normal left ventricular systolic function.  Left ventricular ejection fraction is visually estimated to be 60%.  Grade II diastolic dysfunction.  Mild mitral regurgitation.  Aortic sclerosis with mild stenosis.  Vmax is 2.01 m/s. Transvalvular gradients are - Peak: 16 mmHg,  Mean: 9 mmHg.  Mild tricuspid regurgitation.  Estimated right ventricular systolic pressure is 45 mmHg.     ECHOCARDIOGRAM CONCLUSIONS (05/17/18)  Prior echo 5-18-17. Compared to the report of the study done - there has been no significant change.   Normal left ventricular systolic function.  Left ventricular ejection fraction is visually estimated to be 60%.  Grade II diastolic dysfunction.  Mild mitral regurgitation.  Aortic sclerosis with mild stenosis.  Vmax is 2.01 m/s. Transvalvular gradients are - Peak: 16 mmHg, Mean: 9 mmHg.  Mild tricuspid regurgitation.  Estimated right ventricular systolic pressure is 45 mmHg.     ECHOCARDIOGRAM CONCLUSIONS (05/18/17)  Compared to the prior echo dated 7/9/15, the aortic valve still appears mildly stenotic.  1. Left ventricular ejection fraction is visually estimated to be 60%.   Grade II diastolic dysfunction.  2. There is mild aortic stenosis.  AV Peak Velocity 1.9 m/s                 AV Peak Gradient 14.6 mmHg               AV Mean Gradient 8 mmHg   3. Estimated right ventricular systolic  pressure  is 30 mmHg.     ECHOCARDIOGRAM CONCLUSIONS (07/09/15)  Normal left ventricular systolic function.  Moderate concentric left ventricular hypertrophy.  Normal diastolic function - normal mitral inflow pattern and E/E' is normal.  Mitral annular calcification.  Mild mitral regurgitation.  Mild aortic stenosis.  Mild tricuspid regurgitation.     ECHOCARDIOGRAM CONCLUSIONS (12/12/14)  Technically good study.  Normal left ventricular systolic function.  Left ventricular ejection fraction is 60% to 65%.  Grade I diastolic dysfunction - mitral inflow E/A is <1.0.  Mild mitral regurgitation.  Mild aortic stenosis.  Mild tricuspid regurgitation.     EKG performed on (12/18/14) EKG shows atrial fibrillation.  EKG performed on (12/13/14) EKG shows normal sinus rhythm.     Laboratory results of (07/20/19) were reviewed. Cholesterol profile of 145/78/53/76 noted.  Laboratory results of (09/11/18) Cholesterol profile of 152/82/48/88 noted.  Laboratory results of (03/13/18) Cholesterol profile of 141/78/51/74 noted.  Laboratory results of (03/08/17) Cholesterol profile of 158/97/58/81 noted.  Laboratory results of (06/03/16) Cholesterol profile of 106/102/45/41 noted.  Laboratory results of (12/13/14) Cholesterol profile of 139/98/46/73 noted.    MYOCARDIAL PERFUSION STUDY CONCLUSIONS (09/17/19)  No evidence of significant jeopardized viable myocardium or prior myocardial infarction.  Normal left ventricular size, ejection fraction, and wall motion.  Nondiagnostic stress test.   ECG INTERPRETATION  Negative stress ECG for ischemia.  (study result reviewed)    Assessment:   No diagnosis found.    Medical Decision Making:  Today's Assessment / Status / Plan:     1. Coronary artery disease with prior coronary bypass graft (x 3 with left internal mammary artery graft to left anterior descending artery, saphenous vein graft to diagonal branch, saphenous vein graft to distal obtuse marginal branch by Dr. Richardson 12/15/14): He is  clinically doing well. I will continue with current medical care including clopidogrel, metoprolol and atorvastatin.  2. Hypertension: Blood pressure is well controlled. We will continue with beta blockade therapy and angiotensin receptor blockade.  3. Hyperlipidemia: He is doing well on statin therapy without myalgia symptoms.  4. Carotid artery stenosis: Clinically stable on above medical therapy.    We will follow up the patient in one year.    CC Jasmina [id number]

## 2019-12-09 DIAGNOSIS — I10 ESSENTIAL HYPERTENSION: ICD-10-CM

## 2019-12-09 RX ORDER — LOSARTAN POTASSIUM 100 MG/1
TABLET ORAL
Qty: 100 TAB | Refills: 1 | Status: SHIPPED | OUTPATIENT
Start: 2019-12-09 | End: 2020-06-25

## 2019-12-30 ENCOUNTER — HOSPITAL ENCOUNTER (OUTPATIENT)
Dept: LAB | Facility: MEDICAL CENTER | Age: 74
End: 2019-12-30
Attending: INTERNAL MEDICINE
Payer: MEDICARE

## 2019-12-30 DIAGNOSIS — E78.2 MIXED HYPERLIPIDEMIA: ICD-10-CM

## 2019-12-30 DIAGNOSIS — I10 ESSENTIAL HYPERTENSION: ICD-10-CM

## 2019-12-30 DIAGNOSIS — Z11.59 NEED FOR HEPATITIS C SCREENING TEST: ICD-10-CM

## 2019-12-30 LAB
ALBUMIN SERPL BCP-MCNC: 4.4 G/DL (ref 3.2–4.9)
ALBUMIN/GLOB SERPL: 1.6 G/DL
ALP SERPL-CCNC: 44 U/L (ref 30–99)
ALT SERPL-CCNC: 26 U/L (ref 2–50)
ANION GAP SERPL CALC-SCNC: 10 MMOL/L (ref 0–11.9)
AST SERPL-CCNC: 20 U/L (ref 12–45)
BASOPHILS # BLD AUTO: 0.9 % (ref 0–1.8)
BASOPHILS # BLD: 0.05 K/UL (ref 0–0.12)
BILIRUB SERPL-MCNC: 0.4 MG/DL (ref 0.1–1.5)
BUN SERPL-MCNC: 22 MG/DL (ref 8–22)
CALCIUM SERPL-MCNC: 8.5 MG/DL (ref 8.5–10.5)
CHLORIDE SERPL-SCNC: 105 MMOL/L (ref 96–112)
CHOLEST SERPL-MCNC: 141 MG/DL (ref 100–199)
CO2 SERPL-SCNC: 25 MMOL/L (ref 20–33)
CREAT SERPL-MCNC: 1.05 MG/DL (ref 0.5–1.4)
EOSINOPHIL # BLD AUTO: 0.08 K/UL (ref 0–0.51)
EOSINOPHIL NFR BLD: 1.5 % (ref 0–6.9)
ERYTHROCYTE [DISTWIDTH] IN BLOOD BY AUTOMATED COUNT: 45 FL (ref 35.9–50)
FASTING STATUS PATIENT QL REPORTED: NORMAL
GLOBULIN SER CALC-MCNC: 2.7 G/DL (ref 1.9–3.5)
GLUCOSE SERPL-MCNC: 100 MG/DL (ref 65–99)
HCT VFR BLD AUTO: 45 % (ref 42–52)
HCV AB SER QL: NEGATIVE
HDLC SERPL-MCNC: 44 MG/DL
HGB BLD-MCNC: 15.3 G/DL (ref 14–18)
IMM GRANULOCYTES # BLD AUTO: 0.01 K/UL (ref 0–0.11)
IMM GRANULOCYTES NFR BLD AUTO: 0.2 % (ref 0–0.9)
LDLC SERPL CALC-MCNC: 73 MG/DL
LYMPHOCYTES # BLD AUTO: 1.69 K/UL (ref 1–4.8)
LYMPHOCYTES NFR BLD: 31.2 % (ref 22–41)
MCH RBC QN AUTO: 34.6 PG (ref 27–33)
MCHC RBC AUTO-ENTMCNC: 34 G/DL (ref 33.7–35.3)
MCV RBC AUTO: 101.8 FL (ref 81.4–97.8)
MONOCYTES # BLD AUTO: 0.35 K/UL (ref 0–0.85)
MONOCYTES NFR BLD AUTO: 6.5 % (ref 0–13.4)
NEUTROPHILS # BLD AUTO: 3.24 K/UL (ref 1.82–7.42)
NEUTROPHILS NFR BLD: 59.7 % (ref 44–72)
NRBC # BLD AUTO: 0 K/UL
NRBC BLD-RTO: 0 /100 WBC
PLATELET # BLD AUTO: 220 K/UL (ref 164–446)
PMV BLD AUTO: 11.3 FL (ref 9–12.9)
POTASSIUM SERPL-SCNC: 3.8 MMOL/L (ref 3.6–5.5)
PROT SERPL-MCNC: 7.1 G/DL (ref 6–8.2)
RBC # BLD AUTO: 4.42 M/UL (ref 4.7–6.1)
SODIUM SERPL-SCNC: 140 MMOL/L (ref 135–145)
TRIGL SERPL-MCNC: 118 MG/DL (ref 0–149)
WBC # BLD AUTO: 5.4 K/UL (ref 4.8–10.8)

## 2019-12-30 PROCEDURE — 36415 COLL VENOUS BLD VENIPUNCTURE: CPT

## 2019-12-30 PROCEDURE — 85025 COMPLETE CBC W/AUTO DIFF WBC: CPT

## 2019-12-30 PROCEDURE — 80053 COMPREHEN METABOLIC PANEL: CPT

## 2019-12-30 PROCEDURE — 86803 HEPATITIS C AB TEST: CPT

## 2019-12-30 PROCEDURE — 80061 LIPID PANEL: CPT

## 2020-01-07 ENCOUNTER — APPOINTMENT (OUTPATIENT)
Dept: MEDICAL GROUP | Facility: MEDICAL CENTER | Age: 75
End: 2020-01-07
Payer: MEDICARE

## 2020-01-23 ENCOUNTER — OFFICE VISIT (OUTPATIENT)
Dept: MEDICAL GROUP | Facility: MEDICAL CENTER | Age: 75
End: 2020-01-23
Payer: MEDICARE

## 2020-01-23 VITALS
BODY MASS INDEX: 30.91 KG/M2 | WEIGHT: 203.93 LBS | TEMPERATURE: 97.4 F | DIASTOLIC BLOOD PRESSURE: 60 MMHG | OXYGEN SATURATION: 90 % | HEIGHT: 68 IN | HEART RATE: 61 BPM | SYSTOLIC BLOOD PRESSURE: 106 MMHG

## 2020-01-23 DIAGNOSIS — G89.29 CHRONIC PAIN OF BOTH KNEES: ICD-10-CM

## 2020-01-23 DIAGNOSIS — M54.2 NECK PAIN: ICD-10-CM

## 2020-01-23 DIAGNOSIS — Z95.1 S/P CABG (CORONARY ARTERY BYPASS GRAFT): ICD-10-CM

## 2020-01-23 DIAGNOSIS — I10 ESSENTIAL HYPERTENSION: ICD-10-CM

## 2020-01-23 DIAGNOSIS — Z85.46 HISTORY OF PROSTATE CANCER: ICD-10-CM

## 2020-01-23 DIAGNOSIS — M25.561 CHRONIC PAIN OF BOTH KNEES: ICD-10-CM

## 2020-01-23 DIAGNOSIS — M25.562 CHRONIC PAIN OF BOTH KNEES: ICD-10-CM

## 2020-01-23 DIAGNOSIS — Z85.850 HISTORY OF PAPILLARY ADENOCARCINOMA OF THYROID: ICD-10-CM

## 2020-01-23 DIAGNOSIS — Z23 NEED FOR SHINGLES VACCINE: ICD-10-CM

## 2020-01-23 DIAGNOSIS — I65.23 BILATERAL CAROTID ARTERY STENOSIS: ICD-10-CM

## 2020-01-23 DIAGNOSIS — Z90.79 H/O PROSTATECTOMY: ICD-10-CM

## 2020-01-23 DIAGNOSIS — E78.2 MIXED HYPERLIPIDEMIA: ICD-10-CM

## 2020-01-23 DIAGNOSIS — R73.01 IMPAIRED FASTING BLOOD SUGAR: ICD-10-CM

## 2020-01-23 PROBLEM — H61.22 HEARING LOSS OF LEFT EAR DUE TO CERUMEN IMPACTION: Status: ACTIVE | Noted: 2020-01-23

## 2020-01-23 LAB
HBA1C MFR BLD: 5.4 % (ref 0–5.6)
INT CON NEG: NEGATIVE
INT CON POS: POSITIVE

## 2020-01-23 PROCEDURE — 90750 HZV VACC RECOMBINANT IM: CPT | Performed by: INTERNAL MEDICINE

## 2020-01-23 PROCEDURE — 83036 HEMOGLOBIN GLYCOSYLATED A1C: CPT | Performed by: INTERNAL MEDICINE

## 2020-01-23 PROCEDURE — 99214 OFFICE O/P EST MOD 30 MIN: CPT | Mod: 25 | Performed by: INTERNAL MEDICINE

## 2020-01-23 PROCEDURE — 90471 IMMUNIZATION ADMIN: CPT | Performed by: INTERNAL MEDICINE

## 2020-01-23 ASSESSMENT — PATIENT HEALTH QUESTIONNAIRE - PHQ9: CLINICAL INTERPRETATION OF PHQ2 SCORE: 0

## 2020-01-23 NOTE — PROGRESS NOTES
Subjective:     CC:  Diagnoses of Impaired fasting blood sugar, Bilateral carotid artery stenosis, S/P CABG (coronary artery bypass graft), Essential hypertension, H/O prostatectomy, History of papillary adenocarcinoma of thyroid, Mixed hyperlipidemia, Need for shingles vaccine, Chronic pain of both knees, Neck pain, and History of prostate cancer were pertinent to this visit.    HISTORY OF THE PRESENT ILLNESS: Patient is a 74 y.o. male. This pleasant patient is here today to establish care and discuss the below stated chronic medical conditions. He is accompanied by his wife, Aleksandra.    Problem   S/P Cabg (Coronary Artery Bypass Graft)    He has a history of coronary artery disease with prior coronary bypass graft (x 3 with left internal mammary artery graft to left anterior descending artery, saphenous vein graft to diagonal branch, saphenous vein graft to distal obtuse marginal branch by Dr. Richardson 12/15/14). He continues to follow with cardiology. Current medication regimen includes clopidogrel, metoprolol, and atorvastatin.    No current chest pain, palpitations, or dyspnea on exertion. He is tolerating his medications without difficulty.     Mixed Hyperlipidemia       Ref. Range 12/30/2019 08:16   Cholesterol,Tot Latest Ref Range: 100 - 199 mg/dL 141   Triglycerides Latest Ref Range: 0 - 149 mg/dL 118   HDL Latest Ref Range: >=40 mg/dL 44   LDL Latest Ref Range: <100 mg/dL 73     He has a history of CABG, no history of cerebrovascular disease. He has carotid disease and hypertension. All of his lipid values are at goal on current regimen of atorvastatin 10 mg daily. No side effects from medication like myalgias or GI upset.      Essential Hypertension    He has been on blood pressure medication since he was in his early 60's. He is taking losartan and metoprolol tartrate daily. No signs or symptoms or orthostasis at this time. No chest pain or palpitations. Electrolytes and renal function are normal.       Chronic Pain of Both Knees    He reports history of intermittent pain in bilateral knees. This pain has been progressive and limiting him from exercising fully, as even stationary bike can cause worsening of pain. He denies any known specific injury. Pain is symmetric in both knees, no associated redness or effusion. Achey with twinges of sharp pain throughout. Last knee xrays were completed in 2010 and normal at that time. He takes occasional NSAID's but would like a more definitive answer and treatment if able.     Neck Pain    He reports challenges with worsening neck pain and poor ROM. He denies specific injury to the neck but notes the pain is progressing, achey in nature. Not limiting his ADL's at this point. He has not tried anything yet for this problem but would be interested in evaluation to see if he has developed arthritis in the neck.     Impaired Fasting Blood Sugar    Blood sugar 100 on most recent fasting blood work. He denies prior history of diabetes mellitus or use of glucose lowering medications. A1c obtained in clinic was found to be 5.4, normal range.     History of Papillary Adenocarcinoma of Thyroid       Ref. Range 1/18/2019 08:34 2/19/2019 07:11 4/26/2019 13:56 10/2/2019 14:10   TSH Latest Ref Range: 0.380 - 5.330 uIU/mL 0.060 (L) 0.030 (L) 0.020 (L) 0.190 (L)   Free T-4 Latest Ref Range: 0.53 - 1.43 ng/dL 1.39 1.27 1.25 1.11     Thyroid nodules discovered as an Incidental finding on carotid imaging. He is status post total thyroidectomy on 10/23/18.  He completed a whole body iodine scan on 12/13/19 which did not show metastatic thyroid cancer. He follows with endocrinology who are working to adjust his levothyroxine based on hormone levels.     He denies any signs or symptoms or over-treatment or under-treatment at this time.      Carotid Artery Stenosis    Carotid Duplex (5/2018):  Probable distal right internal carotid occlusion, chronic. No changes from 2017. Mild stenosis of the  left internal carotid (< 50%).  Flow within both subclavian arteries appears to be within normal limits. Antegrade flow, bilateral vertebral arteries.     Vascular surgery recommended medical management, he continues to follow with cardiology for a history of CABG. He is stable on Plavix and statin therapy.      H/O Prostatectomy    He was diagnosed in 2002 but did not have progression warranting treatment until 2008 when he was then recommended to have a total prostatectomy.  He does have post-operative complications of incontinence and reports using 15-20 pads per day (each pack has 52) as well as 3-4 diaper briefs per day (each pack has 14).       History of Prostate Cancer    He was diagnosed in 2002 and underwent total prostatectomy due to progression in 2008. He has post-operative incontinence for which he uses pads and briefs.     PSA 0.02 in 3/2017          Allergies: Patient has no known allergies.    Current Outpatient Medications Ordered in Epic   Medication Sig Dispense Refill   • losartan (COZAAR) 100 MG Tab TAKE 1 TABLET BY MOUTH EVERY  Tab 1   • levothyroxine (SYNTHROID) 137 MCG Tab Take 1 Tab by mouth every day. 90 Tab 3   • atorvastatin (LIPITOR) 10 MG Tab TAKE 1 TAB BY MOUTH EVERY DAY.* DNFU 11/18/17 100 Tab 3   • clopidogrel (PLAVIX) 75 MG Tab TAKE 1 TABLET BY MOUTH EVERY DAY 90 Tab 1   • metoprolol (LOPRESSOR) 25 MG Tab TAKE 1 TAB BY MOUTH 2 TIMES A DAY. 180 Tab 3   • Multiple Vitamins-Minerals (MULTIVITAMIN ADULT PO) Take 1 Tab by mouth every day.     • Cyanocobalamin (VITAMIN B-12) 1000 MCG Tab Take 1,000 mcg by mouth every day.     • Calcium 600 MG Tab Take 1 Tab by mouth every day.       No current [facility]-ordered facility-administered medications on file.        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/18/2018   • High cholesterol    •  Hyperlipidemia    • Hypertension    • Unspecified cataract    • Unspecified urinary incontinence 10/18/2018   • Valvular heart disease        Past Surgical History:   Procedure Laterality Date   • THYROIDECTOMY TOTAL  10/23/2018    Procedure: THYROIDECTOMY TOTAL;  Surgeon: Angel Adams M.D.;  Location: SURGERY [facility];  Service: Ent   • NECK DISSECTION  10/23/2018    Procedure: NECK DISSECTION - POSSIBLE LIMITED CENTRAL;  Surgeon: Angel Adams M.D.;  Location: SURGERY [facility];  Service: Ent   • CATARACT PHACO WITH IOL Left 7/2/2015    Procedure: CATARACT PHACO WITH IOL;  Surgeon: Matty Augustin M.D.;  Location: SURGERY [facility];  Service:    • CATARACT PHACO WITH IOL Right 6/4/2015    Procedure: CATARACT PHACO WITH IOL;  Surgeon: Matty Augustin M.D.;  Location: SURGERY [facility];  Service:    • MULTIPLE CORONARY ARTERY BYPASS ENDO VEIN HARVEST  12/15/2014    Performed by Gustavo Richardson M.D. at SURGERY [facility]   • PROSTATECTOMY, RADICAL RETRO  12/17/2008       Social History     Tobacco Use   • Smoking status: Never Smoker   • Smokeless tobacco: Never Used   • Tobacco comment: continued abstinence   Substance Use Topics   • Alcohol use: No     Alcohol/week: 0.0 oz   • Drug use: No       Social History     Patient does not qualify to have social determinant information on file (likely too young).   Social History Narrative    Clint was born and raised in the [facility]. He has been in [place] since 1989. He continues to work doing taxes, he is a CPA. He is  to [place] for 50 years, they met in college. They have 3 kids- Larissa (49; [place]), Marina (46; [place]), Atif (39; [place]). They enjoy spending times with their grandkids, 4 in [place] and 2 in [place].        Family History   Problem Relation Age of Onset   • Cancer Sister         colon   • Cancer Brother         prostate   • Heart Disease Mother    • Arthritis Mother    • Hypertension Mother    • Hyperlipidemia  "Mother    • Stroke Mother    • Heart Disease Father    • Hypertension Father    • Hyperlipidemia Father    • Stroke Father    • Arthritis Brother    • Hyperlipidemia Brother    • Diabetes Neg Hx        Health Maintenance: Completed    ROS:   As above in the HPI All Others Reviewed and Negative  Objective:     Exam: /60 (BP Location: Left arm, Patient Position: Sitting, BP Cuff Size: Adult)   Pulse 61   Temp 36.3 °C (97.4 °F) (Temporal)   Ht 1.727 m (5' 8\")   Wt 92.5 kg (203 lb 14.8 oz)   SpO2 90%  Body mass index is 31.01 kg/m².    General: Normal appearing. No distress. Appears younger than stated age.  EENT: Eyes conjunctiva clear lids without ptosis, extraocular movements intact, ears normal shape and contour, canals are clear bilaterally, tympanic membranes are benign, oropharynx is without erythema, edema or exudates. Fair dentition.   Neck: Supple without JVD. Thyroid is not enlarged.  No carotid bruits on auscultation.  Pulmonary: Clear to ausculation.  Normal effort. No rales, ronchi, or wheezing. No cough.  Cardiovascular: Regular rate and rhythm without murmur. No lower extremity edema bilaterally.  Abdomen: Tumor in abdomen limits exam.  Soft, nontender, nondistended. Normal bowel sounds.   Neurologic: No resting tremor, no increased tone or rigidity.  Lymph: No cervical or supraclavicular lymph nodes are palpable  Skin: Warm and dry.  No obvious lesions on skin exposed areas..  Musculoskeletal: Mild crepitus on knee flexion/extension left greater than right knee.  He has limitation in active range of motion of the cervical spine and sidebending bilaterally and less so in rotation bilaterally.  Normal cervical flexion/extension.  Normal gait. No extremity cyanosis, clubbing, or edema. Patient ambulates independently without a gait aid.  Psych: Normal mood and affect. Alert and oriented x3. Judgment and insight is normal.    Assessment & Plan:   74 y.o. male with the following -    Problem List " Items Addressed This Visit     S/P CABG (coronary artery bypass graft)     Chronic and stable problem. Continue follow up with cardiology as recommended. Continue BB, Plavix, and statin. Stable kidney function and electrolytes on most recent lab work.         Relevant Orders    Comp Metabolic Panel    H/O prostatectomy     Chronic and stable problem. Recommend he check with his insurance to see if he has any coverage of DME supplies for his incontinence pads/briefs. He will let me know and we can try to get some coverage of his supplies.          History of prostate cancer     Chronic and stable problem. He underwent total prostatectomy. He does continue to experience post-operative incontinence for which he uses pads and briefs.          Carotid artery stenosis     Chronic and stable problem. Continue on current medical therapy. He continues to remain asymptomatic to his carotid disease, no surgical intervention recommended at this time. Continue recommended follow up with cardiology and vascular surgery.         Relevant Orders    Comp Metabolic Panel    History of papillary adenocarcinoma of thyroid     Chronic and stable problem, he is following with endocrinology to adjust his levothyroxine as indicated by thyroid hormone lab levels. He denies any symptoms at this time. Continue follow up with endocrinology as recommended and current dose of levothyroxine.         Chronic pain of both knees     Chronic problem, currently decompensated. Will obtain knee xrays bilaterally to assess degree of arthritis if present and make additional recommendations such as physical therapy and physiatry for knee injections if indicated. He is agreeable with this plan.          Relevant Orders    DX-KNEE 3 VIEWS LEFT    DX-KNEE 3 VIEWS RIGHT    Neck pain     New problem that requires additional evaluation. Will start with cervical xray to identify burden of arthritis in the neck and then can make recommendations for physical  therapy and potential injection in the neck if warranted. He is agreeable with this plan.          Relevant Orders    DX-CERVICAL SPINE-2 OR 3 VIEWS    Impaired fasting blood sugar     New problem that required additional evaluation. A1c obtained during our clinic visit was 5.4, in the normal range. Advised him that we will continue to monitor blood sugar twice annually and can always repeat A1c if it increases again to ensure there is no progression to type 2 diabetes.          Relevant Orders    POCT  A1C (Completed)    Essential hypertension     Chronic and stable problem. Continue current dose of losartan and metoprolol tartrate. Continue follow up with cardiology as recommended.          Relevant Orders    CBC WITH DIFFERENTIAL    Mixed hyperlipidemia     Chronic and stable problem. Lab parameters for cholesterol are all at goal. Continue atorvastatin 10 mg daily. Next lipid panel due in December 2020.            Other Visit Diagnoses     Need for shingles vaccine        Relevant Orders    Shingles Vaccine (Shingrix) (Completed)           Return in about 6 months (around 7/23/2020) for Shingles in 2 months.    Please note that this dictation was created using voice recognition software. I have made every reasonable attempt to correct obvious errors, but I expect that there are errors of grammar and possibly content that I did not discover before finalizing the note.

## 2020-01-23 NOTE — PATIENT INSTRUCTIONS
1. Check with [facility] to see if they are partnered with a DME (durable medical equipment) company that I can contact to order your incontinence supplies so they can be covered by insurance if possible.

## 2020-01-26 PROBLEM — M54.2 NECK PAIN: Status: ACTIVE | Noted: 2020-01-26

## 2020-01-26 PROBLEM — G89.29 CHRONIC PAIN OF BOTH KNEES: Status: ACTIVE | Noted: 2020-01-26

## 2020-01-26 PROBLEM — M25.562 CHRONIC PAIN OF BOTH KNEES: Status: ACTIVE | Noted: 2020-01-26

## 2020-01-26 PROBLEM — R73.01 IMPAIRED FASTING BLOOD SUGAR: Status: ACTIVE | Noted: 2020-01-26

## 2020-01-26 PROBLEM — M25.561 CHRONIC PAIN OF BOTH KNEES: Status: ACTIVE | Noted: 2020-01-26

## 2020-01-26 NOTE — ASSESSMENT & PLAN NOTE
Chronic and stable problem, he is following with endocrinology to adjust his levothyroxine as indicated by thyroid hormone lab levels. He denies any symptoms at this time. Continue follow up with endocrinology as recommended and current dose of levothyroxine.

## 2020-01-26 NOTE — ASSESSMENT & PLAN NOTE
New problem that required additional evaluation. A1c obtained during our clinic visit was 5.4, in the normal range. Advised him that we will continue to monitor blood sugar twice annually and can always repeat A1c if it increases again to ensure there is no progression to type 2 diabetes.

## 2020-01-26 NOTE — ASSESSMENT & PLAN NOTE
Chronic and stable problem. Recommend he check with his insurance to see if he has any coverage of DME supplies for his incontinence pads/briefs. He will let me know and we can try to get some coverage of his supplies.

## 2020-01-26 NOTE — ASSESSMENT & PLAN NOTE
Chronic and stable problem. Continue on current medical therapy. He continues to remain asymptomatic to his carotid disease, no surgical intervention recommended at this time. Continue recommended follow up with cardiology and vascular surgery.

## 2020-01-26 NOTE — ASSESSMENT & PLAN NOTE
New problem that requires additional evaluation. Will start with cervical xray to identify burden of arthritis in the neck and then can make recommendations for physical therapy and potential injection in the neck if warranted. He is agreeable with this plan.

## 2020-01-26 NOTE — ASSESSMENT & PLAN NOTE
Chronic and stable problem. Continue follow up with cardiology as recommended. Continue BB, Plavix, and statin. Stable kidney function and electrolytes on most recent lab work.

## 2020-01-26 NOTE — ASSESSMENT & PLAN NOTE
Chronic problem, currently decompensated. Will obtain knee xrays bilaterally to assess degree of arthritis if present and make additional recommendations such as physical therapy and physiatry for knee injections if indicated. He is agreeable with this plan.

## 2020-01-26 NOTE — ASSESSMENT & PLAN NOTE
Chronic and stable problem. Lab parameters for cholesterol are all at goal. Continue atorvastatin 10 mg daily. Next lipid panel due in December 2020.

## 2020-01-26 NOTE — ASSESSMENT & PLAN NOTE
Chronic and stable problem. He underwent total prostatectomy. He does continue to experience post-operative incontinence for which he uses pads and briefs.

## 2020-01-26 NOTE — ASSESSMENT & PLAN NOTE
Chronic and stable problem. Continue current dose of losartan and metoprolol tartrate. Continue follow up with cardiology as recommended.

## 2020-02-02 DIAGNOSIS — I65.23 BILATERAL CAROTID ARTERY STENOSIS: ICD-10-CM

## 2020-02-03 RX ORDER — CLOPIDOGREL BISULFATE 75 MG/1
TABLET ORAL
Qty: 90 TAB | Refills: 3 | Status: SHIPPED | OUTPATIENT
Start: 2020-02-03 | End: 2021-02-09

## 2020-02-09 DIAGNOSIS — I10 ESSENTIAL HYPERTENSION: ICD-10-CM

## 2020-03-31 ENCOUNTER — APPOINTMENT (OUTPATIENT)
Dept: MEDICAL GROUP | Facility: MEDICAL CENTER | Age: 75
End: 2020-03-31
Payer: MEDICARE

## 2020-07-30 ENCOUNTER — APPOINTMENT (OUTPATIENT)
Dept: MEDICAL GROUP | Facility: MEDICAL CENTER | Age: 75
End: 2020-07-30
Payer: MEDICARE

## 2020-07-31 ENCOUNTER — HOSPITAL ENCOUNTER (OUTPATIENT)
Dept: LAB | Facility: MEDICAL CENTER | Age: 75
End: 2020-07-31
Attending: INTERNAL MEDICINE
Payer: MEDICARE

## 2020-07-31 DIAGNOSIS — E53.8 VITAMIN B 12 DEFICIENCY: ICD-10-CM

## 2020-07-31 DIAGNOSIS — E83.51 HYPOCALCEMIA: ICD-10-CM

## 2020-07-31 DIAGNOSIS — Z85.850 HISTORY OF PAPILLARY ADENOCARCINOMA OF THYROID: ICD-10-CM

## 2020-07-31 DIAGNOSIS — I10 ESSENTIAL HYPERTENSION: ICD-10-CM

## 2020-07-31 DIAGNOSIS — I65.23 BILATERAL CAROTID ARTERY STENOSIS: ICD-10-CM

## 2020-07-31 DIAGNOSIS — Z95.1 S/P CABG (CORONARY ARTERY BYPASS GRAFT): ICD-10-CM

## 2020-07-31 DIAGNOSIS — E55.9 VITAMIN D DEFICIENCY: ICD-10-CM

## 2020-07-31 DIAGNOSIS — E89.0 POST-OPERATIVE HYPOTHYROIDISM: ICD-10-CM

## 2020-07-31 LAB
25(OH)D3 SERPL-MCNC: 89 NG/ML (ref 30–100)
ALBUMIN SERPL BCP-MCNC: 4.5 G/DL (ref 3.2–4.9)
ALBUMIN/GLOB SERPL: 1.7 G/DL
ALP SERPL-CCNC: 53 U/L (ref 30–99)
ALT SERPL-CCNC: 20 U/L (ref 2–50)
ANION GAP SERPL CALC-SCNC: 13 MMOL/L (ref 7–16)
AST SERPL-CCNC: 18 U/L (ref 12–45)
BASOPHILS # BLD AUTO: 0.9 % (ref 0–1.8)
BASOPHILS # BLD: 0.05 K/UL (ref 0–0.12)
BILIRUB SERPL-MCNC: 0.5 MG/DL (ref 0.1–1.5)
BUN SERPL-MCNC: 13 MG/DL (ref 8–22)
CA-I SERPL-SCNC: 1.2 MMOL/L (ref 1.1–1.3)
CALCIUM SERPL-MCNC: 8.9 MG/DL (ref 8.5–10.5)
CALCIUM SERPL-MCNC: 9 MG/DL (ref 8.5–10.5)
CHLORIDE SERPL-SCNC: 101 MMOL/L (ref 96–112)
CO2 SERPL-SCNC: 26 MMOL/L (ref 20–33)
CREAT SERPL-MCNC: 0.86 MG/DL (ref 0.5–1.4)
EOSINOPHIL # BLD AUTO: 0.06 K/UL (ref 0–0.51)
EOSINOPHIL NFR BLD: 1.1 % (ref 0–6.9)
ERYTHROCYTE [DISTWIDTH] IN BLOOD BY AUTOMATED COUNT: 42.9 FL (ref 35.9–50)
GLOBULIN SER CALC-MCNC: 2.7 G/DL (ref 1.9–3.5)
GLUCOSE SERPL-MCNC: 96 MG/DL (ref 65–99)
HCT VFR BLD AUTO: 47.6 % (ref 42–52)
HGB BLD-MCNC: 16 G/DL (ref 14–18)
IMM GRANULOCYTES # BLD AUTO: 0.01 K/UL (ref 0–0.11)
IMM GRANULOCYTES NFR BLD AUTO: 0.2 % (ref 0–0.9)
LYMPHOCYTES # BLD AUTO: 1.42 K/UL (ref 1–4.8)
LYMPHOCYTES NFR BLD: 26.2 % (ref 22–41)
MCH RBC QN AUTO: 33.8 PG (ref 27–33)
MCHC RBC AUTO-ENTMCNC: 33.6 G/DL (ref 33.7–35.3)
MCV RBC AUTO: 100.4 FL (ref 81.4–97.8)
MONOCYTES # BLD AUTO: 0.31 K/UL (ref 0–0.85)
MONOCYTES NFR BLD AUTO: 5.7 % (ref 0–13.4)
NEUTROPHILS # BLD AUTO: 3.57 K/UL (ref 1.82–7.42)
NEUTROPHILS NFR BLD: 65.9 % (ref 44–72)
NRBC # BLD AUTO: 0 K/UL
NRBC BLD-RTO: 0 /100 WBC
PHOSPHATE SERPL-MCNC: 2.7 MG/DL (ref 2.5–4.5)
PLATELET # BLD AUTO: 234 K/UL (ref 164–446)
PMV BLD AUTO: 11.3 FL (ref 9–12.9)
POTASSIUM SERPL-SCNC: 3.9 MMOL/L (ref 3.6–5.5)
PROT SERPL-MCNC: 7.2 G/DL (ref 6–8.2)
PTH-INTACT SERPL-MCNC: 33.3 PG/ML (ref 14–72)
RBC # BLD AUTO: 4.74 M/UL (ref 4.7–6.1)
SODIUM SERPL-SCNC: 140 MMOL/L (ref 135–145)
T3 SERPL-MCNC: 85.8 NG/DL (ref 60–181)
T3FREE SERPL-MCNC: 3.34 PG/ML (ref 2–4.4)
T4 FREE SERPL-MCNC: 2.06 NG/DL (ref 0.93–1.7)
TSH SERPL DL<=0.005 MIU/L-ACNC: 0.08 UIU/ML (ref 0.38–5.33)
VIT B12 SERPL-MCNC: 1894 PG/ML (ref 211–911)
WBC # BLD AUTO: 5.4 K/UL (ref 4.8–10.8)

## 2020-07-31 PROCEDURE — 84432 ASSAY OF THYROGLOBULIN: CPT

## 2020-07-31 PROCEDURE — 84481 FREE ASSAY (FT-3): CPT

## 2020-07-31 PROCEDURE — 82330 ASSAY OF CALCIUM: CPT

## 2020-07-31 PROCEDURE — 82607 VITAMIN B-12: CPT

## 2020-07-31 PROCEDURE — 84480 ASSAY TRIIODOTHYRONINE (T3): CPT

## 2020-07-31 PROCEDURE — 84443 ASSAY THYROID STIM HORMONE: CPT

## 2020-07-31 PROCEDURE — 86800 THYROGLOBULIN ANTIBODY: CPT

## 2020-07-31 PROCEDURE — 84100 ASSAY OF PHOSPHORUS: CPT

## 2020-07-31 PROCEDURE — 80053 COMPREHEN METABOLIC PANEL: CPT

## 2020-07-31 PROCEDURE — 85025 COMPLETE CBC W/AUTO DIFF WBC: CPT

## 2020-07-31 PROCEDURE — 84439 ASSAY OF FREE THYROXINE: CPT

## 2020-07-31 PROCEDURE — 36415 COLL VENOUS BLD VENIPUNCTURE: CPT

## 2020-07-31 PROCEDURE — 82306 VITAMIN D 25 HYDROXY: CPT

## 2020-07-31 PROCEDURE — 83970 ASSAY OF PARATHORMONE: CPT

## 2020-08-02 LAB
THYROGLOB AB SERPL-ACNC: <0.9 IU/ML (ref 0–4)
THYROGLOB SERPL-MCNC: 0.2 NG/ML (ref 1.3–31.8)
THYROGLOB SERPL-MCNC: ABNORMAL NG/ML (ref 1.3–31.8)

## 2020-08-04 ENCOUNTER — OFFICE VISIT (OUTPATIENT)
Dept: MEDICAL GROUP | Facility: MEDICAL CENTER | Age: 75
End: 2020-08-04
Payer: MEDICARE

## 2020-08-04 VITALS
TEMPERATURE: 97.5 F | HEART RATE: 59 BPM | DIASTOLIC BLOOD PRESSURE: 66 MMHG | OXYGEN SATURATION: 95 % | WEIGHT: 195.11 LBS | HEIGHT: 68 IN | SYSTOLIC BLOOD PRESSURE: 120 MMHG | BODY MASS INDEX: 29.57 KG/M2

## 2020-08-04 DIAGNOSIS — E89.0 POSTOPERATIVE HYPOTHYROIDISM: ICD-10-CM

## 2020-08-04 DIAGNOSIS — Z12.11 SCREENING FOR COLORECTAL CANCER: ICD-10-CM

## 2020-08-04 DIAGNOSIS — Z12.12 SCREENING FOR COLORECTAL CANCER: ICD-10-CM

## 2020-08-04 DIAGNOSIS — Z23 NEED FOR SHINGLES VACCINE: ICD-10-CM

## 2020-08-04 DIAGNOSIS — R73.01 IMPAIRED FASTING GLUCOSE: ICD-10-CM

## 2020-08-04 DIAGNOSIS — I10 ESSENTIAL HYPERTENSION: ICD-10-CM

## 2020-08-04 DIAGNOSIS — E78.2 MIXED HYPERLIPIDEMIA: ICD-10-CM

## 2020-08-04 PROCEDURE — 90471 IMMUNIZATION ADMIN: CPT | Performed by: INTERNAL MEDICINE

## 2020-08-04 PROCEDURE — 99214 OFFICE O/P EST MOD 30 MIN: CPT | Mod: 25 | Performed by: INTERNAL MEDICINE

## 2020-08-04 PROCEDURE — 90750 HZV VACC RECOMBINANT IM: CPT | Performed by: INTERNAL MEDICINE

## 2020-08-04 ASSESSMENT — FIBROSIS 4 INDEX: FIB4 SCORE: 1.26

## 2020-08-04 NOTE — ASSESSMENT & PLAN NOTE
Chronic and stable problem.  Blood pressure is at goal.  Electrolytes and kidney function are normal.  Continue losartan and metoprolol tartrate.  He will follow-up with cardiology in October 2020.

## 2020-08-04 NOTE — PROGRESS NOTES
Subjective:   Chief Complaint/History of Present Illness:  Joshua Becerra is a 75 y.o. male established patient who presents today to discuss medical problems as listed below. He is accompanied by his wife, Aleksandra.    Problem   Mixed Hyperlipidemia       Ref. Range 12/30/2019 08:16   Cholesterol,Tot Latest Ref Range: 100 - 199 mg/dL 141   Triglycerides Latest Ref Range: 0 - 149 mg/dL 118   HDL Latest Ref Range: >=40 mg/dL 44   LDL Latest Ref Range: <100 mg/dL 73     He has a history of CABG, no history of cerebrovascular disease. He has carotid disease and hypertension. All of his lipid values are at goal on current regimen of atorvastatin 10 mg daily. No side effects from medication like myalgias or GI upset.      Essential Hypertension    He has been on blood pressure medication since he was in his early 60's. No signs or symptoms or orthostasis at this time. No chest pain or palpitations. Electrolytes and renal function are normal.     Current regimen: Losartan 100 mg daily, metoprolol tartrate 25 mg twice daily    Blood pressure readings in clinic have ranged from 106-130/60-78 between October 2019 and August 2020.    Stress test 2018:  Myocardial Perfusion   Report   NUCLEAR IMAGING INTERPRETATION   No evidence of significant jeopardized viable myocardium or prior myocardial infarction. Normal left ventricular size, ejection fraction, and wall motion. Nondiagnostic stress test.   ECG INTERPRETATION   Negative stress ECG for ischemia.   ECG   Resting ECG: Sinus rhythm. Mild nonspecific ST depression.   Stress ECG: Further ST depression with Regadenoson. This did not meet criteria for ischemia due to resting ECG abnormality.      Ref. Range 7/31/2020 08:07   Sodium Latest Ref Range: 135 - 145 mmol/L 140   Potassium Latest Ref Range: 3.6 - 5.5 mmol/L 3.9   Chloride Latest Ref Range: 96 - 112 mmol/L 101   Bun Latest Ref Range: 8 - 22 mg/dL 13   Creatinine Latest Ref Range: 0.50 - 1.40 mg/dL 0.86   GFR  If Non  Latest Ref Range: >60 mL/min/1.73 m 2 >60        Impaired fasting blood sugar       Ref. Range 12/30/2019 08:16 7/31/2020 08:07   Glucose Latest Ref Range: 65 - 99 mg/dL 100 (H) 96      Ref. Range 1/23/2020 09:19   Glycohemoglobin Latest Ref Range: 0.0 - 5.6 % 5.4     Blood sugar 100 on prior blood work. He denies prior history of diabetes mellitus or use of glucose lowering medications. A1c obtained in clinic was found to be 5.4, normal range.       Postoperative Hypothyroidism       Ref. Range 11/28/2018 08:39 12/12/2018 08:36 1/18/2019 08:34 2/19/2019 07:11 4/26/2019 13:56 10/2/2019 14:10 7/31/2020 08:07   TSH Latest Ref Range: 0.380 - 5.330 uIU/mL 191.750 (H) 1.370 0.060 (L) 0.030 (L) 0.020 (L) 0.190 (L) 0.082 (L)   Free T-4 Latest Ref Range: 0.93 - 1.70 ng/dL  0.87 1.39 1.27 1.25 1.11 2.06 (H)     Thyroid nodules discovered as an incidental finding on carotid imaging. He is status post total thyroidectomy on 10/23/18.  He completed a whole body iodine scan on 12/13/19 which did not show metastatic thyroid cancer. He follows with endocrinology who are working to adjust his levothyroxine based on hormone levels.     He denies any signs or symptoms or over-treatment or under-treatment at this time.  He follows up with endocrinology in September 2020.  The his most recent lab work demonstrates mild overtreatment but he denies any diarrhea, tremulousness, anxiety, or palpitations.  He plans to wait until he sees Dr. Cardona but suspects that he will need to go down on his levothyroxine dose.  He is currently taking levothyroxine 137 mcg daily.     History of Papillary Adenocarcinoma of Thyroid    Thyroid nodules discovered as an incidental finding on carotid imaging. He is status post total thyroidectomy on 10/23/18.  He completed a whole body iodine scan on 12/13/19 which did not show metastatic thyroid cancer. He follows with endocrinology who are working to adjust his levothyroxine based  "on hormone levels.     He denies any signs or symptoms or over-treatment or under-treatment at this time.           Additional History:   Allergies:    Patient has no known allergies.     Current Medications:     Current Outpatient Medications   Medication Sig Dispense Refill   • losartan (COZAAR) 100 MG Tab TAKE 1 TABLET BY MOUTH EVERY  Tab 3   • metoprolol (LOPRESSOR) 25 MG Tab Take 1 Tab by mouth 2 times a day. 180 Tab 3   • clopidogrel (PLAVIX) 75 MG Tab TAKE 1 TABLET BY MOUTH EVERY DAY 90 Tab 3   • levothyroxine (SYNTHROID) 137 MCG Tab Take 1 Tab by mouth every day. 90 Tab 3   • atorvastatin (LIPITOR) 10 MG Tab TAKE 1 TAB BY MOUTH EVERY DAY.* DNFU 11/18/17 100 Tab 3   • Multiple Vitamins-Minerals (MULTIVITAMIN ADULT PO) Take 1 Tab by mouth every day.     • Cyanocobalamin (VITAMIN B-12) 1000 MCG Tab Take 500 mcg by mouth every day.     • Calcium 600 MG Tab Take 1 Tab by mouth every day.       No current facility-administered medications for this visit.         Social History:     Social History     Tobacco Use   • Smoking status: Never Smoker   • Smokeless tobacco: Never Used   • Tobacco comment: continued abstinence   Substance Use Topics   • Alcohol use: No     Alcohol/week: 0.0 oz   • Drug use: No       ROS: As above in the HPI      Objective:   Physical Exam:    Vitals: /66   Pulse (!) 59   Temp 36.4 °C (97.5 °F) (Temporal)   Ht 1.727 m (5' 8\")   Wt 88.5 kg (195 lb 1.7 oz)   SpO2 95%    BMI: Body mass index is 29.67 kg/m².   General/Constitutional: Vitals as above, Well nourished, well developed male in no acute distress   Head/Eyes: Head is grossly normal & atraumatic, bilateral conjunctivae clear and not injected, bilateral EOMI, bilateral PERRLA, glasses in place.   ENT: Bilateral external ears grossly normal in appearance, Hearing grossly intact, External nares normal in appearance and without discharge/bleeding   Respiratory: No respiratory distress, bilateral lungs are clear to " ausculation in all lung fields, no wheezing/rhonchi/rales   Cardiovascular: Regular rate and rhythm without murmur/rubs, distal pulses are intact and equal bilaterally (radial), no bilateral lower extremity edema   MSK: Gait grossly normal & not antalgic   Integumentary: No apparent rashes on skin exposed areas. Well healed sternotomy incision.   Psych: Judgment grossly appropriate, no apparent depression/anxiety    Health Maintenance:     - Completed      Assessment and Plan:     Problem List Items Addressed This Visit     Postoperative hypothyroidism     Chronic and ongoing problem.  He has follow-up with his endocrinologist next month.  No current signs or symptoms of hyperthyroidism despite his mildly elevated free T4.  He will continue on the levothyroxine 137 mcg daily until he hears from his endocrinologist.  Asked him to watch out for palpitations, tremulousness, anxiety, or frequent stools.  He is not experiencing any of those at this time.         Relevant Orders    Comp Metabolic Panel    Impaired fasting blood sugar     Previous problem, stable.  Glucose has reduced some in the past 6 months.  No indication for medicines.  He has been working hard with his diet and is actually lost about 8 pounds for which I congratulated him on.  Encouraged him to continue with watching carbohydrate and sugar intake.         Relevant Orders    Comp Metabolic Panel    Essential hypertension     Chronic and stable problem.  Blood pressure is at goal.  Electrolytes and kidney function are normal.  Continue losartan and metoprolol tartrate.  He will follow-up with cardiology in October 2020.         Relevant Orders    CBC WITH DIFFERENTIAL    Comp Metabolic Panel    Mixed hyperlipidemia     Chronic and stable problem.  Due for repeat lipid panel in December 2020.  Continue atorvastatin 10 mg daily, he is tolerating without difficulty.  He will follow-up with cardiology in October 2020.         Relevant Orders    Comp  Metabolic Panel    Lipid Profile      Other Visit Diagnoses     Screening for colorectal cancer        Relevant Orders    REFERRAL TO GI FOR COLONOSCOPY    Need for shingles vaccine        Relevant Orders    Shingles Vaccine (Shingrix) (Completed)             RTC: 6-7 months.    PLEASE NOTE: This dictation was created using voice recognition software. I have made every reasonable attempt to correct obvious errors, but I expect that there are errors of grammar and possibly content that I did not discover before finalizing the note.

## 2020-08-04 NOTE — ASSESSMENT & PLAN NOTE
Previous problem, stable.  Glucose has reduced some in the past 6 months.  No indication for medicines.  He has been working hard with his diet and is actually lost about 8 pounds for which I congratulated him on.  Encouraged him to continue with watching carbohydrate and sugar intake.

## 2020-08-04 NOTE — ASSESSMENT & PLAN NOTE
Chronic and stable problem.  Due for repeat lipid panel in December 2020.  Continue atorvastatin 10 mg daily, he is tolerating without difficulty.  He will follow-up with cardiology in October 2020.

## 2020-08-04 NOTE — ASSESSMENT & PLAN NOTE
Chronic and ongoing problem.  He has follow-up with his endocrinologist next month.  No current signs or symptoms of hyperthyroidism despite his mildly elevated free T4.  He will continue on the levothyroxine 137 mcg daily until he hears from his endocrinologist.  Asked him to watch out for palpitations, tremulousness, anxiety, or frequent stools.  He is not experiencing any of those at this time.

## 2020-09-18 ENCOUNTER — PATIENT OUTREACH (OUTPATIENT)
Dept: HEALTH INFORMATION MANAGEMENT | Facility: OTHER | Age: 75
End: 2020-09-18

## 2020-09-27 DIAGNOSIS — E78.2 MIXED HYPERLIPIDEMIA: ICD-10-CM

## 2020-09-27 NOTE — PROGRESS NOTES
Endocrinology Clinic Progress Note  PCP: Estela Garber D.O.    HPI:  Joshua Becerra is a 75 y.o. old patient who comes in today for review of endocrine problems.    Postoperative Hypothyroidism:  She has a history of  Papillary thyroid carcinoma and had a total thyroidectomy on 10/23/2018.  Currently taking Levothyroxine 137 mcg daily.  Denies any shakiness or palpitations on this dose.  Results for JER BECERRA (MRN [id number]) as of 9/26/2020 19:32   Ref. Range 7/31/2020 08:07   TSH Latest Ref Range: 0.380 - 5.330 uIU/mL 0.082 (L)   Free T-4 Latest Ref Range: 0.93 - 1.70 ng/dL 2.06 (H)   T3 Latest Ref Range: 60.0 - 181.0 ng/dL 85.8   T3,Free Latest Ref Range: 2.00 - 4.40 pg/mL 3.34   Thyroglobulin by LC-MC/MS-S/P Latest Ref Range: 1.3 - 31.8 ng/mL Not Applicable   Thyroglobulin Latest Ref Range: 1.3 - 31.8 ng/mL 0.2 (L)   Pth, Intact Latest Ref Range: 14.0 - 72.0 pg/mL 33.3   Anti-Thyroglobulin Latest Ref Range: 0.0 - 4.0 IU/mL <0.9     Hyperlipidemia:  Currently taking Lipitor 10 mg daily.  Results for JER BECERRA (MRN [id number]) as of 9/26/2020 19:32   Ref. Range 12/30/2019 08:16   Cholesterol,Tot Latest Ref Range: 100 - 199 mg/dL 141   Triglycerides Latest Ref Range: 0 - 149 mg/dL 118   HDL Latest Ref Range: >=40 mg/dL 44   LDL Latest Ref Range: <100 mg/dL 73     Hypertension:  Currently taking Losartan 100 mg daily.    ROS:  Constitutional: No unintentional weight loss  Endo: Denies excessive thirst or frequent urination  All other systems were reviewed and were negative.    Past Medical History:  Patient Active Problem List    Diagnosis Date Noted   • S/P CABG (coronary artery bypass graft) 12/15/2014     Priority: High   • CAD (coronary artery disease) 12/14/2014     Priority: High   • Mixed hyperlipidemia 12/12/2014     Priority: Low   • Essential hypertension 12/01/2014     Priority: Low   • Chronic pain of both knees 01/26/2020   • Neck pain 01/26/2020   • Impaired fasting  "blood sugar 01/26/2020   • Postoperative hypothyroidism 07/23/2019   • History of papillary adenocarcinoma of thyroid 09/06/2018   • Arthritis of right knee 09/16/2016   • Bilateral low back pain without sciatica 09/16/2016   • Nocturnal enuresis 10/28/2015   • Other and combined forms of senile cataract 06/04/2015   • Carotid artery stenosis 12/14/2014   • H/O prostatectomy 09/03/2013   • History of prostate cancer 09/03/2013       Medications:    Current Outpatient Medications:   •  atorvastatin (LIPITOR) 10 MG Tab, TAKE 1 TAB BY MOUTH EVERY DAY., Disp: 100 Tab, Rfl: 3  •  levothyroxine (SYNTHROID) 125 MCG Tab, Take 1 Tab by mouth Every morning on an empty stomach., Disp: 90 Tab, Rfl: 3  •  losartan (COZAAR) 100 MG Tab, TAKE 1 TABLET BY MOUTH EVERY DAY, Disp: 100 Tab, Rfl: 3  •  metoprolol (LOPRESSOR) 25 MG Tab, Take 1 Tab by mouth 2 times a day., Disp: 180 Tab, Rfl: 3  •  clopidogrel (PLAVIX) 75 MG Tab, TAKE 1 TABLET BY MOUTH EVERY DAY, Disp: 90 Tab, Rfl: 3  •  Multiple Vitamins-Minerals (MULTIVITAMIN ADULT PO), Take 1 Tab by mouth every day., Disp: , Rfl:   •  Cyanocobalamin (VITAMIN B-12) 1000 MCG Tab, Take 500 mcg by mouth every day., Disp: , Rfl:   •  Calcium 600 MG Tab, Take 1 Tab by mouth every day., Disp: , Rfl:     Labs: Reviewed    Physical Examination:  Vital signs: /70   Pulse 63   Ht 1.727 m (5' 8\")   Wt 87.5 kg (193 lb)   SpO2 97%   BMI 29.35 kg/m²  Body mass index is 29.35 kg/m². Patient's body mass index is 29.35 kg/m². Exercise and nutrition counseling were performed at this visit.  General: No apparent distress, cooperative  Eyes: No scleral icterus, no discharge, normal eyelids  Neck: No abnormal masses on inspection, Scar from previous thyroidectomy present.   Resp: Normal effort, clear to auscultation bilaterally  CVS: Regular rate and rhythm, S1 S2 normal, no murmur  Extremities: No lower extremity edema  Abdomen: abdominal obesity present  Musculoskeletal: Normal digits and " nails  Skin: No rash on visible skin  Psych: Alert and oriented, normal mood and affect, intact memory and able to make informed decisions.    Assessment and Plan:    1. Post-operative hypothyroidism  Reduce levothyroxine from 137 mcg to 125 mcg daily.     2. History of papillary adenocarcinoma of thyroid  In remission.     3. Mixed hyperlipidemia  Continue statin.     4. Essential hypertension  Controlled.       No follow-ups on file.    Thank you for allowing me to participate in the care of this patient.    Ernie Cardona M.D.    CC:   Estela Garber D.O.    This note was created using voice recognition software (Dragon). The accuracy of the dictation is limited by the abilities of the software. I have reviewed the note prior to signing, however some errors in grammar and context are still possible. If you have any questions related to this note please do not hesitate to contact our office.

## 2020-09-28 ENCOUNTER — OFFICE VISIT (OUTPATIENT)
Dept: ENDOCRINOLOGY | Facility: MEDICAL CENTER | Age: 75
End: 2020-09-28
Attending: INTERNAL MEDICINE
Payer: MEDICARE

## 2020-09-28 VITALS
HEART RATE: 63 BPM | BODY MASS INDEX: 29.25 KG/M2 | WEIGHT: 193 LBS | OXYGEN SATURATION: 97 % | DIASTOLIC BLOOD PRESSURE: 70 MMHG | HEIGHT: 68 IN | SYSTOLIC BLOOD PRESSURE: 110 MMHG

## 2020-09-28 DIAGNOSIS — I10 ESSENTIAL HYPERTENSION: ICD-10-CM

## 2020-09-28 DIAGNOSIS — Z85.850 HISTORY OF PAPILLARY ADENOCARCINOMA OF THYROID: ICD-10-CM

## 2020-09-28 DIAGNOSIS — E89.0 POST-OPERATIVE HYPOTHYROIDISM: ICD-10-CM

## 2020-09-28 DIAGNOSIS — E78.2 MIXED HYPERLIPIDEMIA: ICD-10-CM

## 2020-09-28 PROCEDURE — 99214 OFFICE O/P EST MOD 30 MIN: CPT | Performed by: INTERNAL MEDICINE

## 2020-09-28 PROCEDURE — 99212 OFFICE O/P EST SF 10 MIN: CPT | Performed by: INTERNAL MEDICINE

## 2020-09-28 RX ORDER — LEVOTHYROXINE SODIUM 0.12 MG/1
125 TABLET ORAL
Qty: 90 TAB | Refills: 3 | Status: SHIPPED | OUTPATIENT
Start: 2020-09-28 | End: 2021-02-23 | Stop reason: SDUPTHER

## 2020-09-28 RX ORDER — ATORVASTATIN CALCIUM 10 MG/1
TABLET, FILM COATED ORAL
Qty: 100 TAB | Refills: 3 | Status: SHIPPED | OUTPATIENT
Start: 2020-09-28 | End: 2021-02-23 | Stop reason: SDUPTHER

## 2020-09-28 ASSESSMENT — FIBROSIS 4 INDEX: FIB4 SCORE: 1.26

## 2020-10-19 ENCOUNTER — OFFICE VISIT (OUTPATIENT)
Dept: CARDIOLOGY | Facility: MEDICAL CENTER | Age: 75
End: 2020-10-19
Payer: MEDICARE

## 2020-10-19 VITALS
WEIGHT: 193.1 LBS | HEART RATE: 60 BPM | HEIGHT: 68 IN | RESPIRATION RATE: 12 BRPM | OXYGEN SATURATION: 96 % | BODY MASS INDEX: 29.27 KG/M2 | SYSTOLIC BLOOD PRESSURE: 128 MMHG | DIASTOLIC BLOOD PRESSURE: 78 MMHG

## 2020-10-19 DIAGNOSIS — I35.0 NONRHEUMATIC AORTIC VALVE STENOSIS: ICD-10-CM

## 2020-10-19 DIAGNOSIS — E78.2 MIXED HYPERLIPIDEMIA: ICD-10-CM

## 2020-10-19 DIAGNOSIS — I65.23 BILATERAL CAROTID ARTERY STENOSIS: ICD-10-CM

## 2020-10-19 DIAGNOSIS — I10 ESSENTIAL HYPERTENSION: ICD-10-CM

## 2020-10-19 DIAGNOSIS — Z95.1 S/P CABG (CORONARY ARTERY BYPASS GRAFT): ICD-10-CM

## 2020-10-19 DIAGNOSIS — I25.10 CORONARY ARTERY DISEASE INVOLVING NATIVE CORONARY ARTERY OF NATIVE HEART WITHOUT ANGINA PECTORIS: ICD-10-CM

## 2020-10-19 PROCEDURE — 99214 OFFICE O/P EST MOD 30 MIN: CPT | Performed by: INTERNAL MEDICINE

## 2020-10-19 RX ORDER — A/SINGAPORE/GP1908/2015 IVR-180 (AN A/MICHIGAN/45/2015 (H1N1)PDM09-LIKE VIRUS, A/HONG KONG/4801/2014, NYMC X-263B (H3N2) (AN A/HONG KONG/4801/2014-LIKE VIRUS), AND B/BRISBANE/60/2008, WILD TYPE (A B/BRISBANE/60/2008-LIKE VIRUS) 15; 15; 15 UG/.5ML; UG/.5ML; UG/.5ML
INJECTION, SUSPENSION INTRAMUSCULAR
COMMUNITY
Start: 2020-09-18 | End: 2020-10-19

## 2020-10-19 ASSESSMENT — ENCOUNTER SYMPTOMS
ABDOMINAL PAIN: 0
SHORTNESS OF BREATH: 0
DIZZINESS: 0
DOUBLE VISION: 0
HEADACHES: 0
VOMITING: 0
PSYCHIATRIC NEGATIVE: 1
CLAUDICATION: 0
DEPRESSION: 0
RESPIRATORY NEGATIVE: 1
BLURRED VISION: 0
NAUSEA: 0
CARDIOVASCULAR NEGATIVE: 1
MYALGIAS: 0
COUGH: 0
FEVER: 0
GASTROINTESTINAL NEGATIVE: 1
WEAKNESS: 0
NEUROLOGICAL NEGATIVE: 1
BRUISES/BLEEDS EASILY: 0
MUSCULOSKELETAL NEGATIVE: 1
CONSTITUTIONAL NEGATIVE: 1
CHILLS: 0
PALPITATIONS: 0
FOCAL WEAKNESS: 0
NERVOUS/ANXIOUS: 0
EYES NEGATIVE: 1
WEIGHT LOSS: 0

## 2020-10-19 ASSESSMENT — FIBROSIS 4 INDEX: FIB4 SCORE: 1.26

## 2020-10-19 NOTE — PROGRESS NOTES
Chief Complaint   Patient presents with   • Coronary Artery Disease     follow up       Subjective:   Ed Ed Becerra is a 75 y.o. male who presents today for annual follow up of coronary artery disease with prior coronary artery bypass graft surgery and aortic stenosis.    Since the patient's last visit on 10/18/20, he has been doing well clinically. He denies chest pain, shortness of breath, palpitations, nausea/vomiting or diaphoresis. He and his wife has been keeping safe.    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/18/2018   • High cholesterol    • Hyperlipidemia    • Hypertension    • Unspecified cataract    • Unspecified urinary incontinence 10/18/2018   • Valvular heart disease      Past Surgical History:   Procedure Laterality Date   • THYROIDECTOMY TOTAL  10/23/2018    Procedure: THYROIDECTOMY TOTAL;  Surgeon: Angel Adams M.D.;  Location: [facility];  Service: Ent   • NECK DISSECTION  10/23/2018    Procedure: NECK DISSECTION - POSSIBLE LIMITED CENTRAL;  Surgeon: Angel Adams M.D.;  Location: [facility];  Service: Ent   • CATARACT PHACO WITH IOL Left 7/2/2015    Procedure: CATARACT PHACO WITH IOL;  Surgeon: Matty Augustin M.D.;  Location: [facility];  Service:    • CATARACT PHACO WITH IOL Right 6/4/2015    Procedure: CATARACT PHACO WITH IOL;  Surgeon: Matty Augustin M.D.;  Location: [facility];  Service:    • MULTIPLE CORONARY ARTERY BYPASS ENDO VEIN HARVEST  12/15/2014    Performed by Gustavo Richardson M.D. at [facility]   • PROSTATECTOMY, RADICAL RETRO  12/17/2008     Family History   Problem Relation Age of Onset   • Cancer Sister         colon   • Cancer Brother         prostate   • Heart Disease Mother    • Arthritis Mother    • Hypertension Mother    • Hyperlipidemia Mother     • Stroke Mother    • Heart Disease Father    • Hypertension Father    • Hyperlipidemia Father    • Stroke Father    • Arthritis Brother    • Hyperlipidemia Brother    • Diabetes Neg Hx      Social History     Socioeconomic History   • Marital status:      Spouse name: Not on file   • Number of children: Not on file   • Years of education: Not on file   • Highest education level: Not on file   Occupational History   • Not on file   Social Needs   • Financial resource strain: Not on file   • Food insecurity     Worry: Not on file     Inability: Not on file   • Transportation needs     Medical: Not on file     Non-medical: Not on file   Tobacco Use   • Smoking status: Never Smoker   • Smokeless tobacco: Never Used   • Tobacco comment: continued abstinence   Substance and Sexual Activity   • Alcohol use: No     Alcohol/week: 0.0 oz   • Drug use: No   • Sexual activity: Not Currently     Partners: Female     Comment: s/p prostatectomy   Lifestyle   • Physical activity     Days per week: Not on file     Minutes per session: Not on file   • Stress: Not on file   Relationships   • Social connections     Talks on phone: Not on file     Gets together: Not on file     Attends Yazidi service: Not on file     Active member of club or organization: Not on file     Attends meetings of clubs or organizations: Not on file     Relationship status: Not on file   • Intimate partner violence     Fear of current or ex partner: Not on file     Emotionally abused: Not on file     Physically abused: Not on file     Forced sexual activity: Not on file   Other Topics Concern   • Not on file   Social History Narrative    Clint was born and raised in the [facility] [place]. He has been in [place] since 1989. He continues to work doing taxes, he is a CPA. He is  to [place] for 50 years, they met in college. They have 3 kids- Larissa (49; [place]), Marina (46; [place]), Atif (39; [place]). They enjoy spending times with their grandkids, 4 in [place]  and 2 in [place].      No Known Allergies     (Medications reviewed.)  Outpatient Encounter Medications as of 10/19/2020   Medication Sig Dispense Refill   • atorvastatin (LIPITOR) 10 MG Tab TAKE 1 TAB BY MOUTH EVERY DAY. 100 Tab 3   • levothyroxine (SYNTHROID) 125 MCG Tab Take 1 Tab by mouth Every morning on an empty stomach. 90 Tab 3   • losartan (COZAAR) 100 MG Tab TAKE 1 TABLET BY MOUTH EVERY  Tab 3   • metoprolol (LOPRESSOR) 25 MG Tab Take 1 Tab by mouth 2 times a day. 180 Tab 3   • clopidogrel (PLAVIX) 75 MG Tab TAKE 1 TABLET BY MOUTH EVERY DAY 90 Tab 3   • Multiple Vitamins-Minerals (MULTIVITAMIN ADULT PO) Take 1 Tab by mouth every day.     • Cyanocobalamin (VITAMIN B-12) 1000 MCG Tab Take 500 mcg by mouth every day.     • Calcium 600 MG Tab Take 1 Tab by mouth every day.     • [DISCONTINUED] FLUAD QUADRIVALENT 0.5 ML Prefilled Syringe PHARMACY ADMINISTERED       No facility-administered encounter medications on file as of 10/19/2020.      Review of Systems   Constitutional: Negative.  Negative for chills, fever, malaise/fatigue and weight loss.   HENT: Negative.  Negative for hearing loss.    Eyes: Negative.  Negative for blurred vision and double vision.   Respiratory: Negative.  Negative for cough and shortness of breath.    Cardiovascular: Negative.  Negative for chest pain, palpitations, claudication and leg swelling.   Gastrointestinal: Negative.  Negative for abdominal pain, nausea and vomiting.   Genitourinary: Negative.  Negative for dysuria and urgency.   Musculoskeletal: Negative.  Negative for joint pain and myalgias.   Skin: Negative.  Negative for itching and rash.   Neurological: Negative.  Negative for dizziness, focal weakness, weakness and headaches.   Endo/Heme/Allergies: Negative.  Does not bruise/bleed easily.   Psychiatric/Behavioral: Negative.  Negative for depression. The patient is not nervous/anxious.         Objective:   /78 (BP Location: Left arm, Patient Position:  "Sitting, BP Cuff Size: Adult)   Pulse 60   Resp 12   Ht 1.727 m (5' 8\")   Wt 87.6 kg (193 lb 1.6 oz)   SpO2 96%   BMI 29.36 kg/m²     Physical Exam   Constitutional: He is oriented to person, place, and time. He appears well-developed and well-nourished.   HENT:   Head: Normocephalic and atraumatic.   Eyes: EOM are normal.   Neck: No JVD present.   Cardiovascular: Normal rate, regular rhythm and normal heart sounds.   Pulmonary/Chest: Effort normal and breath sounds normal.   Abdominal: Soft. Bowel sounds are normal.   No hepatosplenomegaly.   Musculoskeletal: Normal range of motion.   Lymphadenopathy:     He has no cervical adenopathy.   Neurological: He is alert and oriented to person, place, and time.   Skin: Skin is warm and dry.   Psychiatric: He has a normal mood and affect.     CARDIAC STUDIES/PROCEDURES:     CARDIAC CATHETERIZATION CONCLUSIONS (12/13/14)  1. Two-vessel coronary artery disease with mid left anterior descending   artery, ostial diagonal branch and proximal circumflex artery stenosis.   2. Normal left ventricular systolic function with ejection fraction of 55%.   3. Mildly elevated left ventricular end diastolic pressure.   4. Mild aortic stenosis.     CAROTID ULTRASOUND (05/17/18)  Probable distal right internal carotid occlusion, chronic. No changes from 2017.  Mild stenosis of the left internal carotid (< 50%).   Flow within both subclavian arteries appears to be within normal limits.   Antegrade flow, bilateral vertebral arteries.   Incidental left-sided thyroid cyst noted.  Follow as indicated clinically.     CAROTID ULTRASOUND (12/13/14)  RIGHT:  Very mild smooth plaque of the common carotids & bifurcation with minimal   plaque in the proximal internal carotid artery. The ICA waveforms are   resistive with low peak systolic & end diastolic velocities which may   suggest distal obstruction or occlusion.  LEFT:  Very mild smooth plaque of the common carotids & bifurcation extending " into   the internal carotid. Velocities are consistent with < 50% stenosis of the   internal carotid artery.   Bilateral subclavian and vertebral artery waveforms are antegrade and   waveforms are normal in character and velocity.      CT OF HEAD (12/18/14)  1. Occlusion or near occlusion of the intracranial right internal carotid artery with apparent tiny branch extending to the middle cerebral artery. The right middle cerebral artery appears to be supplied primarily by the anterior communicating artery.     CT OF NECK (12/18/14)  1. Diffuse narrowing of the right internal carotid artery beginning approximately 2 cm distal to its origin could be due to diffuse atherosclerotic disease or chronic dissection.  2. Occlusion or near occlusion of the intracranial portion of the right internal carotid artery.  3. No evidence of left internal carotid artery stenosis.     ECHOCARDIOGRAM CONCLUSIONS (05/17/18)  Prior echo 5-18-17. Compared to the report of the study done - there has been no significant change.   Normal left ventricular systolic function.  Left ventricular ejection fraction is visually estimated to be 60%.  Grade II diastolic dysfunction.  Mild mitral regurgitation.  Aortic sclerosis with mild stenosis.  Vmax is 2.01 m/s. Transvalvular gradients are - Peak: 16 mmHg,  Mean: 9 mmHg.  Mild tricuspid regurgitation.  Estimated right ventricular systolic pressure is 45 mmHg.     ECHOCARDIOGRAM CONCLUSIONS (05/17/18)  Prior echo 5-18-17. Compared to the report of the study done - there has been no significant change.   Normal left ventricular systolic function.  Left ventricular ejection fraction is visually estimated to be 60%.  Grade II diastolic dysfunction.  Mild mitral regurgitation.  Aortic sclerosis with mild stenosis.  Vmax is 2.01 m/s. Transvalvular gradients are - Peak: 16 mmHg, Mean: 9 mmHg.  Mild tricuspid regurgitation.  Estimated right ventricular systolic pressure is 45 mmHg.     ECHOCARDIOGRAM  CONCLUSIONS (05/18/17)  Compared to the prior echo dated 7/9/15, the aortic valve still appears mildly stenotic.  1. Left ventricular ejection fraction is visually estimated to be 60%.   Grade II diastolic dysfunction.  2. There is mild aortic stenosis.  AV Peak Velocity 1.9 m/s                 AV Peak Gradient 14.6 mmHg               AV Mean Gradient 8 mmHg   3. Estimated right ventricular systolic pressure  is 30 mmHg.     ECHOCARDIOGRAM CONCLUSIONS (07/09/15)  Normal left ventricular systolic function.  Moderate concentric left ventricular hypertrophy.  Normal diastolic function - normal mitral inflow pattern and E/E' is normal.  Mitral annular calcification.  Mild mitral regurgitation.  Mild aortic stenosis.  Mild tricuspid regurgitation.     ECHOCARDIOGRAM CONCLUSIONS (12/12/14)  Technically good study.  Normal left ventricular systolic function.  Left ventricular ejection fraction is 60% to 65%.  Grade I diastolic dysfunction - mitral inflow E/A is <1.0.  Mild mitral regurgitation.  Mild aortic stenosis.  Mild tricuspid regurgitation.     EKG performed on (12/18/14) EKG shows atrial fibrillation. (Post CABG)  EKG performed on (12/13/14) EKG shows normal sinus rhythm.     Laboratory results of (12/20/19) were reviewed. Cholesterol profile of 141/118/44/73 mg/dl noted.  Laboratory results of (07/20/19) Cholesterol profile of 145/78/53/76 noted.  Laboratory results of (09/11/18) Cholesterol profile of 152/82/48/88 noted.  Laboratory results of (03/13/18) Cholesterol profile of 141/78/51/74 noted.  Laboratory results of (03/08/17) Cholesterol profile of 158/97/58/81 noted.  Laboratory results of (06/03/16) Cholesterol profile of 106/102/45/41 noted.  Laboratory results of (12/13/14) Cholesterol profile of 139/98/46/73 noted.     MYOCARDIAL PERFUSION STUDY CONCLUSIONS (09/17/19)  No evidence of significant jeopardized viable myocardium or prior myocardial infarction.  Normal left ventricular size, ejection  fraction, and wall motion.  Nondiagnostic stress test.   ECG INTERPRETATION  Negative stress ECG for ischemia.    Assessment:     1. Coronary artery disease involving native coronary artery of native heart without angina pectoris     2. S/P CABG (coronary artery bypass graft)     3. Nonrheumatic aortic valve stenosis     4. Essential hypertension     5. Mixed hyperlipidemia     6. Bilateral carotid artery stenosis       Medical Decision Making:  Today's Assessment / Status / Plan:     1. Coronary artery disease with prior coronary bypass graft (x 3 with left internal mammary artery graft to left anterior descending artery, saphenous vein graft to diagonal branch, saphenous vein graft to distal obtuse marginal branch by Dr. Richardson 12/15/14): He is clinically doing well. I will continue with current medical care including clopidogrel, metoprolol, losartan and atorvastatin.  2. Aortic stenosis: He remains asymptomatic with mild aortic stenosis. We will follow him clinically and repeat an echocardiogram.  3. Hypertension: Blood pressure is well controlled. We will continue with beta blockade therapy and angiotensin receptor blockade.  4. Hyperlipidemia: He is doing well on statin therapy without myalgia symptoms.  5. Carotid artery stenosis: Clinically stable on above medical therapy. We will repeat a carotid ultrasound.    We will follow up the patient in one year.    CC Estela Cabezas

## 2020-12-23 DIAGNOSIS — I10 ESSENTIAL HYPERTENSION: ICD-10-CM

## 2020-12-24 NOTE — TELEPHONE ENCOUNTER
Received request via: Pharmacy    Was the patient seen in the last year in this department? Yes    Does the patient have an active prescription (recently filled or refills available) for medication(s) requested? No     Second request

## 2021-01-15 DIAGNOSIS — Z23 NEED FOR VACCINATION: ICD-10-CM

## 2021-01-15 SDOH — ECONOMIC STABILITY: HOUSING INSECURITY
IN THE LAST 12 MONTHS, WAS THERE A TIME WHEN YOU DID NOT HAVE A STEADY PLACE TO SLEEP OR SLEPT IN A SHELTER (INCLUDING NOW)?: NO

## 2021-01-15 SDOH — ECONOMIC STABILITY: INCOME INSECURITY: IN THE LAST 12 MONTHS, WAS THERE A TIME WHEN YOU WERE NOT ABLE TO PAY THE MORTGAGE OR RENT ON TIME?: NO

## 2021-01-15 SDOH — ECONOMIC STABILITY: TRANSPORTATION INSECURITY
IN THE PAST 12 MONTHS, HAS LACK OF RELIABLE TRANSPORTATION KEPT YOU FROM MEDICAL APPOINTMENTS, MEETINGS, WORK OR FROM GETTING THINGS NEEDED FOR DAILY LIVING?: NO

## 2021-01-15 SDOH — ECONOMIC STABILITY: HOUSING INSECURITY

## 2021-01-15 SDOH — HEALTH STABILITY: PHYSICAL HEALTH: ON AVERAGE, HOW MANY MINUTES DO YOU ENGAGE IN EXERCISE AT THIS LEVEL?: 10 MINUTES

## 2021-01-15 SDOH — ECONOMIC STABILITY: TRANSPORTATION INSECURITY
IN THE PAST 12 MONTHS, HAS THE LACK OF TRANSPORTATION KEPT YOU FROM MEDICAL APPOINTMENTS OR FROM GETTING MEDICATIONS?: NO

## 2021-01-15 SDOH — HEALTH STABILITY: MENTAL HEALTH
STRESS IS WHEN SOMEONE FEELS TENSE, NERVOUS, ANXIOUS, OR CAN'T SLEEP AT NIGHT BECAUSE THEIR MIND IS TROUBLED. HOW STRESSED ARE YOU?: ONLY A LITTLE

## 2021-01-15 SDOH — HEALTH STABILITY: PHYSICAL HEALTH: ON AVERAGE, HOW MANY DAYS PER WEEK DO YOU ENGAGE IN MODERATE TO STRENUOUS EXERCISE (LIKE A BRISK WALK)?: 3 DAYS

## 2021-01-15 ASSESSMENT — SOCIAL DETERMINANTS OF HEALTH (SDOH)
HOW OFTEN DO YOU GET TOGETHER WITH FRIENDS OR RELATIVES?: ONCE A WEEK
HOW OFTEN DO YOU ATTEND CHURCH OR RELIGIOUS SERVICES?: MORE THAN 4 TIMES PER YEAR
HOW MANY DRINKS CONTAINING ALCOHOL DO YOU HAVE ON A TYPICAL DAY WHEN YOU ARE DRINKING: DECLINE
HOW OFTEN DO YOU HAVE SIX OR MORE DRINKS ON ONE OCCASION: NEVER
IN A TYPICAL WEEK, HOW MANY TIMES DO YOU TALK ON THE PHONE WITH FAMILY, FRIENDS, OR NEIGHBORS?: MORE THAN THREE TIMES A WEEK
WITHIN THE PAST 12 MONTHS, THE FOOD YOU BOUGHT JUST DIDN'T LAST AND YOU DIDN'T HAVE MONEY TO GET MORE: NEVER TRUE
HOW OFTEN DO YOU HAVE A DRINK CONTAINING ALCOHOL: NEVER
HOW OFTEN DO YOU ATTENT MEETINGS OF THE CLUB OR ORGANIZATION YOU BELONG TO?: NEVER
WITHIN THE PAST 12 MONTHS, YOU WORRIED THAT YOUR FOOD WOULD RUN OUT BEFORE YOU GOT THE MONEY TO BUY MORE: NEVER TRUE
DO YOU BELONG TO ANY CLUBS OR ORGANIZATIONS SUCH AS CHURCH GROUPS UNIONS, FRATERNAL OR ATHLETIC GROUPS, OR SCHOOL GROUPS?: NO
HOW HARD IS IT FOR YOU TO PAY FOR THE VERY BASICS LIKE FOOD, HOUSING, MEDICAL CARE, AND HEATING?: NOT VERY HARD

## 2021-01-19 ENCOUNTER — OFFICE VISIT (OUTPATIENT)
Dept: MEDICAL GROUP | Facility: MEDICAL CENTER | Age: 76
End: 2021-01-19
Payer: MEDICARE

## 2021-01-19 VITALS
OXYGEN SATURATION: 95 % | WEIGHT: 198.85 LBS | HEART RATE: 56 BPM | BODY MASS INDEX: 29.45 KG/M2 | HEIGHT: 69 IN | DIASTOLIC BLOOD PRESSURE: 64 MMHG | SYSTOLIC BLOOD PRESSURE: 122 MMHG | TEMPERATURE: 98.5 F | RESPIRATION RATE: 18 BRPM

## 2021-01-19 DIAGNOSIS — E89.0 POSTOPERATIVE HYPOTHYROIDISM: ICD-10-CM

## 2021-01-19 DIAGNOSIS — I10 ESSENTIAL HYPERTENSION: ICD-10-CM

## 2021-01-19 DIAGNOSIS — Z90.79 H/O PROSTATECTOMY: ICD-10-CM

## 2021-01-19 DIAGNOSIS — Z95.1 S/P CABG (CORONARY ARTERY BYPASS GRAFT): ICD-10-CM

## 2021-01-19 DIAGNOSIS — I35.0 NONRHEUMATIC AORTIC VALVE STENOSIS: ICD-10-CM

## 2021-01-19 DIAGNOSIS — Z85.46 HISTORY OF PROSTATE CANCER: ICD-10-CM

## 2021-01-19 DIAGNOSIS — R73.01 IMPAIRED FASTING GLUCOSE: ICD-10-CM

## 2021-01-19 DIAGNOSIS — M25.561 CHRONIC PAIN OF RIGHT KNEE: ICD-10-CM

## 2021-01-19 DIAGNOSIS — G89.29 CHRONIC PAIN OF RIGHT KNEE: ICD-10-CM

## 2021-01-19 DIAGNOSIS — D75.89 MACROCYTOSIS WITHOUT ANEMIA: ICD-10-CM

## 2021-01-19 DIAGNOSIS — I25.10 CORONARY ARTERY DISEASE INVOLVING NATIVE CORONARY ARTERY OF NATIVE HEART WITHOUT ANGINA PECTORIS: ICD-10-CM

## 2021-01-19 DIAGNOSIS — I65.23 BILATERAL CAROTID ARTERY STENOSIS: ICD-10-CM

## 2021-01-19 DIAGNOSIS — Z85.850 HISTORY OF PAPILLARY ADENOCARCINOMA OF THYROID: ICD-10-CM

## 2021-01-19 DIAGNOSIS — E78.2 MIXED HYPERLIPIDEMIA: ICD-10-CM

## 2021-01-19 PROCEDURE — 99214 OFFICE O/P EST MOD 30 MIN: CPT | Performed by: INTERNAL MEDICINE

## 2021-01-19 PROCEDURE — 8041 PR SCP AHA: Performed by: INTERNAL MEDICINE

## 2021-01-19 ASSESSMENT — FIBROSIS 4 INDEX: FIB4 SCORE: 1.26

## 2021-01-19 ASSESSMENT — PATIENT HEALTH QUESTIONNAIRE - PHQ9: CLINICAL INTERPRETATION OF PHQ2 SCORE: 0

## 2021-01-19 NOTE — ASSESSMENT & PLAN NOTE
S/p total thyroidectomy 10/23/18  Following with endocrinology, he will let us know if he need another referral to new endocrinologist

## 2021-01-19 NOTE — ASSESSMENT & PLAN NOTE
Ref. Range 12/30/2019 08:16 7/31/2020 08:07   Glucose Latest Ref Range: 65 - 99 mg/dL 100 (H) 96      Ref. Range 1/23/2020 09:19   Glycohemoglobin Latest Ref Range: 0.0 - 5.6 % 5.4     Blood sugar 100 on prior blood work. He denies prior history of diabetes mellitus or use of glucose lowering medications. A1c obtained in clinic was found to be 5.4, normal range.

## 2021-01-19 NOTE — ASSESSMENT & PLAN NOTE
Status post cardiac triple bypass surgery with left internal mammary artery graft to left anterior descending artery, saphenous vein graft to diagonal branch, saphenous vein graft to distal obtuse marginal branch by Dr. Richardson (12/15/14).  Plavix, atorvastatin, losartan, metoprolol  Denies chest pain, SOB, palpitation, leg edema

## 2021-01-19 NOTE — ASSESSMENT & PLAN NOTE
Chronic, s/p Prostatectomy due to prostate cancer  will monitor PSA.  Has urinary incontinence, using pads

## 2021-01-19 NOTE — ASSESSMENT & PLAN NOTE
Ref. Range 12/30/2019 08:16   Cholesterol,Tot Latest Ref Range: 100 - 199 mg/dL 141   Triglycerides Latest Ref Range: 0 - 149 mg/dL 118   HDL Latest Ref Range: >=40 mg/dL 44   LDL Latest Ref Range: <100 mg/dL 73     He has a history of CABG, no history of cerebrovascular disease. He has carotid disease and hypertension. All of his lipid values are at goal on current regimen of atorvastatin 10 mg daily. No side effects from medication like myalgias or GI upset.

## 2021-01-19 NOTE — ASSESSMENT & PLAN NOTE
Follows with cardiologist, Dr Garcia, pending echo    5/2018 TTE  Prior echo 5-18-17. Compared to the report of the study done - there   has been no significant change.   Normal left ventricular systolic function.  Left ventricular ejection fraction is visually estimated to be 60%.  Grade II diastolic dysfunction.  Mild mitral regurgitation.  Aortic sclerosis with mild stenosis.  Vmax is 2.01 m/s. Transvalvular gradients are - Peak: 16 mmHg,  Mean: 9   mmHg.  Mild tricuspid regurgitation.  Estimated right ventricular systolic pressure is 45 mmHg.

## 2021-01-19 NOTE — PROGRESS NOTES
Annual Health Assessment Questions:    1.  Are you currently engaging in any exercise or physical activity? Yes    2.  How would you describe your mood or emotional well-being today? good    3.  Have you had any falls in the last year? No    4.  Have you noticed any problems with your balance or had difficulty walking? No    5.  In the last six months have you experienced any leakage of urine? Yes  Using pads    6. DPA/Advanced Directive: Patient does not have an Advanced Directive.  A packet and workshop information was given on Advanced Directives.     New Patient to Establish    Reason to establish: New patient to establish    Joshua Becerra is a 75 y.o. male who presents today with the following:    CC:   Chief Complaint   Patient presents with   • Establish Care     pain when justyna down stairs        HPI:     CAD (coronary artery disease)  Status post cardiac triple bypass surgery with left internal mammary artery graft to left anterior descending artery, saphenous vein graft to diagonal branch, saphenous vein graft to distal obtuse marginal branch by Dr. Richardson (12/15/14).  Plavix, atorvastatin, losartan, metoprolol  Denies chest pain, SOB, palpitation, leg edema      Carotid artery stenosis  Carotid Duplex (5/2018):  Probable distal right internal carotid occlusion, chronic. No changes from 2017. Mild stenosis of the left internal carotid (< 50%).  Flow within both subclavian arteries appears to be within normal limits. Antegrade flow, bilateral vertebral arteries.     Vascular surgery recommended medical management, he continues to follow with cardiology for a history of CABG. He is stable on Plavix and statin therapy.       Chronic pain of right knee  Chronic right knee pain, worse with weightbearing and walking downstairs  He takes tylenol as needed  Trial of topical capsaicin or lidocaine  Pending x ray          Essential hypertension  Stable on metoprolol and losartan    H/O  prostatectomy  Chronic, s/p Prostatectomy due to prostate cancer  will monitor PSA.  Has urinary incontinence, using pads      History of papillary adenocarcinoma of thyroid  S/p total thyroidectomy 10/23/18  Following with endocrinology, he will let us know if he need another referral to new endocrinologist      History of prostate cancer  He was diagnosed in 2002 and underwent total prostatectomy due to progression in 2008. He has post-operative incontinence for which he uses pads and briefs.     PSA 0.02 in 3/2017  Will monitor PSA    Impaired fasting blood sugar     Ref. Range 12/30/2019 08:16 7/31/2020 08:07   Glucose Latest Ref Range: 65 - 99 mg/dL 100 (H) 96      Ref. Range 1/23/2020 09:19   Glycohemoglobin Latest Ref Range: 0.0 - 5.6 % 5.4     Blood sugar 100 on prior blood work. He denies prior history of diabetes mellitus or use of glucose lowering medications. A1c obtained in clinic was found to be 5.4, normal range.        Mixed hyperlipidemia     Ref. Range 12/30/2019 08:16   Cholesterol,Tot Latest Ref Range: 100 - 199 mg/dL 141   Triglycerides Latest Ref Range: 0 - 149 mg/dL 118   HDL Latest Ref Range: >=40 mg/dL 44   LDL Latest Ref Range: <100 mg/dL 73     He has a history of CABG, no history of cerebrovascular disease. He has carotid disease and hypertension. All of his lipid values are at goal on current regimen of atorvastatin 10 mg daily. No side effects from medication like myalgias or GI upset.       Nonrheumatic aortic valve stenosis  Follows with cardiologist, Dr Garcia, pending echo    5/2018 TTE  Prior echo 5-18-17. Compared to the report of the study done - there   has been no significant change.   Normal left ventricular systolic function.  Left ventricular ejection fraction is visually estimated to be 60%.  Grade II diastolic dysfunction.  Mild mitral regurgitation.  Aortic sclerosis with mild stenosis.  Vmax is 2.01 m/s. Transvalvular gradients are - Peak: 16 mmHg,  Mean: 9   mmHg.  Mild  tricuspid regurgitation.  Estimated right ventricular systolic pressure is 45 mmHg.      Postoperative hypothyroidism  Uncontrolled  Following with endocrinology  Pending lab, titrate levothyroxine as appropriate    S/P CABG (coronary artery bypass graft)  He has a history of coronary artery disease with prior coronary bypass graft (x 3 with left internal mammary artery graft to left anterior descending artery, saphenous vein graft to diagonal branch, saphenous vein graft to distal obtuse marginal branch by Dr. Richardson 12/15/14). He continues to follow with cardiology. Current medication regimen includes clopidogrel, metoprolol, and atorvastatin.    No current chest pain, palpitations, or dyspnea on exertion. He is tolerating his medications without difficulty.          Current Outpatient Medications:   •  metoprolol (LOPRESSOR) 25 MG Tab, TAKE 1 TABLET BY MOUTH TWICE A DAY, Disp: 180 Tab, Rfl: 3  •  atorvastatin (LIPITOR) 10 MG Tab, TAKE 1 TAB BY MOUTH EVERY DAY., Disp: 100 Tab, Rfl: 3  •  levothyroxine (SYNTHROID) 125 MCG Tab, Take 1 Tab by mouth Every morning on an empty stomach., Disp: 90 Tab, Rfl: 3  •  losartan (COZAAR) 100 MG Tab, TAKE 1 TABLET BY MOUTH EVERY DAY, Disp: 100 Tab, Rfl: 3  •  clopidogrel (PLAVIX) 75 MG Tab, TAKE 1 TABLET BY MOUTH EVERY DAY, Disp: 90 Tab, Rfl: 3  •  Multiple Vitamins-Minerals (MULTIVITAMIN ADULT PO), Take 1 Tab by mouth every day., Disp: , Rfl:   •  Cyanocobalamin (VITAMIN B-12) 1000 MCG Tab, Take 500 mcg by mouth every day., Disp: , Rfl:   •  Calcium 600 MG Tab, Take 1 Tab by mouth every day., Disp: , Rfl:     Allergies, past medical history, past surgical history, medications, family history, social history reviewed and updated.    ROS     Constitutional: Denies fevers or chills  Eyes: Denies changes in vision  Ears/Nose/Throat/Mouth: Denies nasal congestion or sore throat   Cardiovascular: Denies chest pain or palpitations   Respiratory: Denies shortness of breath , Denies  "cough  Gastrointestinal/Hepatic: Denies abd pain, nausea, vomiting   Genitourinary: urinary incontinence at baseline Denies dysuria or frequency  Musculoskeletal/Rheum: knee pain, more on the right  Neurological: Denies headache  Psychiatric: Denies mood disorder   Endocrine: Denies hx of diabetes or thyroid dysfunction  Heme/Oncology/Lymph Nodes: Denies weight changes or enlarged LNs.    Physical Exam  /64 (BP Location: Left arm, Patient Position: Sitting, BP Cuff Size: Adult long)   Pulse (!) 56   Temp 36.9 °C (98.5 °F) (Temporal)   Resp 18   Ht 1.753 m (5' 9\")   Wt 90.2 kg (198 lb 13.7 oz)   SpO2 95%   BMI 29.37 kg/m²   General: Normal appearance.  Well developed, well nourished, no acute distress.  HEENT: Normocephalic.  Extraocular motion intact. Pupils are equally round, reactive to light and accommodation, conjunctiva clear, no scleral icterus.  Ears: normal shape and contour, ear canals clear, tympanic membranes intact. Hearing intact.  Oropharynx clear, no erythema, edema or exudate noted.  NECK: Thyroid is not enlarged. No JVD.  No carotid bruits. No masses.  Cardiovascular: Regular rhythm and rate.   Respiratory: Normal respiratory effort, clear to auscultation bilaterally.  Abdomen: Bowel sounds present, soft, nontender, nondistended, no rebound, no guarding.   : No suprapubic tenderness. No CVA tenderness.   EXT: no LE edema b/l. No cyanosis.  No clubbing.  Lymph: No cervical, supraclavicular or axillary lymph nodes are palpable  Skin: Warm and dry.    Psych: AAOx3,  Normal mood and affect, normal judgment and insight, memory at baseline      Assessment and Plan    1. Chronic pain of right knee  - DX-KNEE 3 VIEWS RIGHT; Future  - REFERRAL TO PHYSIATRY (PMR)  Tylenol prn  Trial of topical capsaicin or lidocaine    2. Postoperative hypothyroidism  3. History of papillary adenocarcinoma of thyroid  - TSH WITH REFLEX TO FT4; Future  - TRIIDOTHYRONINE; Future  - THYROGLOBULIN, QT; Future  - " ANTITHYROGLOBULIN AB; Future  Continue current regimen  Follow with endocrinology    4. History of prostate cancer  - PROSTATE SPECIFIC AG    5. H/O prostatectomy  Wearing pads for urinary incontinence    6. Coronary artery disease involving native coronary artery of native heart without angina pectoris  7. S/P CABG (coronary artery bypass graft)  Continue current regimen  Follow with cardiology    8. Bilateral carotid artery stenosis  Plavix, statin  Follow with cardiology  Pending doppler US    9. Nonrheumatic aortic valve stenosis  pending echo  Follow with cardiology    10. Essential hypertension  - CBC WITH DIFFERENTIAL; Future  - Comp Metabolic Panel; Future  - CBC WITH DIFFERENTIAL; Future  Continue current regimen    11. Mixed hyperlipidemia  - Lipid Profile; Future  Statin    12. Impaired fasting blood sugar  Lifestyle modifications    13. Macrocytosis without anemia  - VITAMIN B12; Future  - FOLATE; Future        Follow-up:Return in about 1 month (around 2/19/2021), or if symptoms worsen or fail to improve.    This note was created using voice recognition software. There may be unintended errors in spelling, grammar or content.

## 2021-01-19 NOTE — ASSESSMENT & PLAN NOTE
He was diagnosed in 2002 and underwent total prostatectomy due to progression in 2008. He has post-operative incontinence for which he uses pads and briefs.     PSA 0.02 in 3/2017  Will monitor PSA

## 2021-01-19 NOTE — ASSESSMENT & PLAN NOTE
Carotid Duplex (5/2018):  Probable distal right internal carotid occlusion, chronic. No changes from 2017. Mild stenosis of the left internal carotid (< 50%).  Flow within both subclavian arteries appears to be within normal limits. Antegrade flow, bilateral vertebral arteries.     Vascular surgery recommended medical management, he continues to follow with cardiology for a history of CABG. He is stable on Plavix and statin therapy.

## 2021-01-19 NOTE — ASSESSMENT & PLAN NOTE
Chronic right knee pain, worse with weightbearing and walking downstairs  He takes tylenol as needed  Trial of topical capsaicin or lidocaine  Pending x ray

## 2021-01-19 NOTE — ASSESSMENT & PLAN NOTE
He has a history of coronary artery disease with prior coronary bypass graft (x 3 with left internal mammary artery graft to left anterior descending artery, saphenous vein graft to diagonal branch, saphenous vein graft to distal obtuse marginal branch by Dr. Richardson 12/15/14). He continues to follow with cardiology. Current medication regimen includes clopidogrel, metoprolol, and atorvastatin.    No current chest pain, palpitations, or dyspnea on exertion. He is tolerating his medications without difficulty.

## 2021-02-08 DIAGNOSIS — I65.23 BILATERAL CAROTID ARTERY STENOSIS: ICD-10-CM

## 2021-02-09 ENCOUNTER — PATIENT MESSAGE (OUTPATIENT)
Dept: CARDIOLOGY | Facility: MEDICAL CENTER | Age: 76
End: 2021-02-09

## 2021-02-10 RX ORDER — CLOPIDOGREL BISULFATE 75 MG/1
TABLET ORAL
Qty: 100 TABLET | Refills: 3 | Status: SHIPPED | OUTPATIENT
Start: 2021-02-10 | End: 2022-01-25

## 2021-02-18 ENCOUNTER — HOSPITAL ENCOUNTER (OUTPATIENT)
Dept: LAB | Facility: MEDICAL CENTER | Age: 76
End: 2021-02-18
Attending: INTERNAL MEDICINE
Payer: MEDICARE

## 2021-02-18 DIAGNOSIS — I10 ESSENTIAL HYPERTENSION: ICD-10-CM

## 2021-02-18 DIAGNOSIS — E89.0 POSTOPERATIVE HYPOTHYROIDISM: ICD-10-CM

## 2021-02-18 DIAGNOSIS — E78.2 MIXED HYPERLIPIDEMIA: ICD-10-CM

## 2021-02-18 DIAGNOSIS — Z85.850 HISTORY OF PAPILLARY ADENOCARCINOMA OF THYROID: ICD-10-CM

## 2021-02-18 DIAGNOSIS — D75.89 MACROCYTOSIS WITHOUT ANEMIA: ICD-10-CM

## 2021-02-18 LAB
ALBUMIN SERPL BCP-MCNC: 4.3 G/DL (ref 3.2–4.9)
ALBUMIN/GLOB SERPL: 1.5 G/DL
ALP SERPL-CCNC: 55 U/L (ref 30–99)
ALT SERPL-CCNC: 20 U/L (ref 2–50)
ANION GAP SERPL CALC-SCNC: 10 MMOL/L (ref 7–16)
AST SERPL-CCNC: 19 U/L (ref 12–45)
BASOPHILS # BLD AUTO: 1.1 % (ref 0–1.8)
BASOPHILS # BLD: 0.05 K/UL (ref 0–0.12)
BILIRUB SERPL-MCNC: 0.5 MG/DL (ref 0.1–1.5)
BUN SERPL-MCNC: 16 MG/DL (ref 8–22)
CALCIUM SERPL-MCNC: 9.4 MG/DL (ref 8.5–10.5)
CHLORIDE SERPL-SCNC: 103 MMOL/L (ref 96–112)
CHOLEST SERPL-MCNC: 158 MG/DL (ref 100–199)
CO2 SERPL-SCNC: 28 MMOL/L (ref 20–33)
CREAT SERPL-MCNC: 0.9 MG/DL (ref 0.5–1.4)
EOSINOPHIL # BLD AUTO: 0.06 K/UL (ref 0–0.51)
EOSINOPHIL NFR BLD: 1.3 % (ref 0–6.9)
ERYTHROCYTE [DISTWIDTH] IN BLOOD BY AUTOMATED COUNT: 44.2 FL (ref 35.9–50)
FASTING STATUS PATIENT QL REPORTED: NORMAL
FOLATE SERPL-MCNC: 29.2 NG/ML
GLOBULIN SER CALC-MCNC: 2.9 G/DL (ref 1.9–3.5)
GLUCOSE SERPL-MCNC: 101 MG/DL (ref 65–99)
HCT VFR BLD AUTO: 46.2 % (ref 42–52)
HDLC SERPL-MCNC: 46 MG/DL
HGB BLD-MCNC: 15.7 G/DL (ref 14–18)
IMM GRANULOCYTES # BLD AUTO: 0.02 K/UL (ref 0–0.11)
IMM GRANULOCYTES NFR BLD AUTO: 0.4 % (ref 0–0.9)
LDLC SERPL CALC-MCNC: 93 MG/DL
LYMPHOCYTES # BLD AUTO: 1.33 K/UL (ref 1–4.8)
LYMPHOCYTES NFR BLD: 28.5 % (ref 22–41)
MCH RBC QN AUTO: 34.4 PG (ref 27–33)
MCHC RBC AUTO-ENTMCNC: 34 G/DL (ref 33.7–35.3)
MCV RBC AUTO: 101.1 FL (ref 81.4–97.8)
MONOCYTES # BLD AUTO: 0.29 K/UL (ref 0–0.85)
MONOCYTES NFR BLD AUTO: 6.2 % (ref 0–13.4)
NEUTROPHILS # BLD AUTO: 2.92 K/UL (ref 1.82–7.42)
NEUTROPHILS NFR BLD: 62.5 % (ref 44–72)
NRBC # BLD AUTO: 0 K/UL
NRBC BLD-RTO: 0 /100 WBC
PLATELET # BLD AUTO: 217 K/UL (ref 164–446)
PMV BLD AUTO: 11.2 FL (ref 9–12.9)
POTASSIUM SERPL-SCNC: 4 MMOL/L (ref 3.6–5.5)
PROT SERPL-MCNC: 7.2 G/DL (ref 6–8.2)
PSA SERPL-MCNC: 0.13 NG/ML (ref 0–4)
RBC # BLD AUTO: 4.57 M/UL (ref 4.7–6.1)
SODIUM SERPL-SCNC: 141 MMOL/L (ref 135–145)
T3 SERPL-MCNC: 88.4 NG/DL (ref 60–181)
TRIGL SERPL-MCNC: 96 MG/DL (ref 0–149)
TSH SERPL DL<=0.005 MIU/L-ACNC: 0.48 UIU/ML (ref 0.38–5.33)
VIT B12 SERPL-MCNC: 1711 PG/ML (ref 211–911)
WBC # BLD AUTO: 4.7 K/UL (ref 4.8–10.8)

## 2021-02-18 PROCEDURE — 82607 VITAMIN B-12: CPT

## 2021-02-18 PROCEDURE — 84480 ASSAY TRIIODOTHYRONINE (T3): CPT

## 2021-02-18 PROCEDURE — 80061 LIPID PANEL: CPT

## 2021-02-18 PROCEDURE — 84432 ASSAY OF THYROGLOBULIN: CPT

## 2021-02-18 PROCEDURE — 80053 COMPREHEN METABOLIC PANEL: CPT

## 2021-02-18 PROCEDURE — 84443 ASSAY THYROID STIM HORMONE: CPT

## 2021-02-18 PROCEDURE — 36415 COLL VENOUS BLD VENIPUNCTURE: CPT

## 2021-02-18 PROCEDURE — 85025 COMPLETE CBC W/AUTO DIFF WBC: CPT

## 2021-02-18 PROCEDURE — 84153 ASSAY OF PSA TOTAL: CPT

## 2021-02-18 PROCEDURE — 86800 THYROGLOBULIN ANTIBODY: CPT

## 2021-02-18 PROCEDURE — 82746 ASSAY OF FOLIC ACID SERUM: CPT

## 2021-02-23 ENCOUNTER — OFFICE VISIT (OUTPATIENT)
Dept: MEDICAL GROUP | Facility: MEDICAL CENTER | Age: 76
End: 2021-02-23
Payer: MEDICARE

## 2021-02-23 VITALS
OXYGEN SATURATION: 97 % | WEIGHT: 194 LBS | BODY MASS INDEX: 28.73 KG/M2 | TEMPERATURE: 98.3 F | HEIGHT: 69 IN | SYSTOLIC BLOOD PRESSURE: 118 MMHG | RESPIRATION RATE: 18 BRPM | DIASTOLIC BLOOD PRESSURE: 62 MMHG | HEART RATE: 62 BPM

## 2021-02-23 DIAGNOSIS — E89.0 POSTOPERATIVE HYPOTHYROIDISM: ICD-10-CM

## 2021-02-23 DIAGNOSIS — Z85.46 HISTORY OF PROSTATE CANCER: ICD-10-CM

## 2021-02-23 DIAGNOSIS — Z12.11 SCREENING FOR COLON CANCER: ICD-10-CM

## 2021-02-23 DIAGNOSIS — I25.10 CORONARY ARTERY DISEASE INVOLVING NATIVE CORONARY ARTERY OF NATIVE HEART WITHOUT ANGINA PECTORIS: ICD-10-CM

## 2021-02-23 DIAGNOSIS — Z95.1 S/P CABG (CORONARY ARTERY BYPASS GRAFT): ICD-10-CM

## 2021-02-23 DIAGNOSIS — Z90.79 H/O PROSTATECTOMY: ICD-10-CM

## 2021-02-23 DIAGNOSIS — E89.0 POST-OPERATIVE HYPOTHYROIDISM: ICD-10-CM

## 2021-02-23 DIAGNOSIS — I35.0 NONRHEUMATIC AORTIC VALVE STENOSIS: ICD-10-CM

## 2021-02-23 DIAGNOSIS — D75.89 MACROCYTOSIS WITHOUT ANEMIA: ICD-10-CM

## 2021-02-23 DIAGNOSIS — I65.23 BILATERAL CAROTID ARTERY STENOSIS: ICD-10-CM

## 2021-02-23 DIAGNOSIS — I10 ESSENTIAL HYPERTENSION: ICD-10-CM

## 2021-02-23 DIAGNOSIS — Z85.850 HISTORY OF PAPILLARY ADENOCARCINOMA OF THYROID: ICD-10-CM

## 2021-02-23 DIAGNOSIS — E78.2 MIXED HYPERLIPIDEMIA: ICD-10-CM

## 2021-02-23 PROCEDURE — 99214 OFFICE O/P EST MOD 30 MIN: CPT | Performed by: INTERNAL MEDICINE

## 2021-02-23 RX ORDER — ATORVASTATIN CALCIUM 10 MG/1
10 TABLET, FILM COATED ORAL
Qty: 100 TABLET | Refills: 3 | Status: SHIPPED | OUTPATIENT
Start: 2021-02-23 | End: 2021-05-04

## 2021-02-23 RX ORDER — LEVOTHYROXINE SODIUM 0.12 MG/1
125 TABLET ORAL
Qty: 90 TABLET | Refills: 3 | Status: SHIPPED | OUTPATIENT
Start: 2021-02-23 | End: 2022-01-31

## 2021-02-23 RX ORDER — LOSARTAN POTASSIUM 100 MG/1
100 TABLET ORAL
Qty: 100 TABLET | Refills: 3 | Status: SHIPPED | OUTPATIENT
Start: 2021-02-23 | End: 2022-04-29

## 2021-02-23 ASSESSMENT — FIBROSIS 4 INDEX: FIB4 SCORE: 1.43

## 2021-02-23 NOTE — PROGRESS NOTES
Established Patient    Joshua Becerra is a 75 y.o. male who presents today with the following:    CC:   Chief Complaint   Patient presents with   • Lab Results   • Referral Needed     Endocronologist       HPI:     CAD (coronary artery disease)  Status post cardiac triple bypass surgery with left internal mammary artery graft to left anterior descending artery, saphenous vein graft to diagonal branch, saphenous vein graft to distal obtuse marginal branch by Dr. Richardson (12/15/14).  Plavix, atorvastatin, losartan, metoprolol  Denies chest pain, SOB, palpitation, leg edema      S/P CABG (coronary artery bypass graft)  He has a history of coronary artery disease with prior coronary bypass graft (x 3 with left internal mammary artery graft to left anterior descending artery, saphenous vein graft to diagonal branch, saphenous vein graft to distal obtuse marginal branch by Dr. Richardson 12/15/14). He continues to follow with cardiology. Current medication regimen includes clopidogrel, metoprolol, and atorvastatin.    No current chest pain, palpitations, or dyspnea on exertion. He is tolerating his medications without difficulty.      Nonrheumatic aortic valve stenosis  Follows with cardiologist, Dr Garcia, pending echo    5/2018 TTE  Prior echo 5-18-17. Compared to the report of the study done - there   has been no significant change.   Normal left ventricular systolic function.  Left ventricular ejection fraction is visually estimated to be 60%.  Grade II diastolic dysfunction.  Mild mitral regurgitation.  Aortic sclerosis with mild stenosis.  Vmax is 2.01 m/s. Transvalvular gradients are - Peak: 16 mmHg,  Mean: 9   mmHg.  Mild tricuspid regurgitation.  Estimated right ventricular systolic pressure is 45 mmHg.      H/O prostatectomy  chronic, status post prostatectomy due to prostate cancer, will monitor PSA has history of urinary incontinence using pads   Ref. Range 12/2/2014 06:59 3/8/2017 08:53 2/18/2021 08:07    Prostatic Specific Antigen Tot Latest Ref Range: 0.00 - 4.00 ng/mL 0.01 0.02 0.13         History of prostate cancer  He was diagnosed in 2002 and underwent total prostatectomy due to progression in 2008. He has post-operative incontinence for which he uses pads and briefs.     PSA 0.02 in 3/2017     Ref. Range 12/2/2014 06:59 3/8/2017 08:53 2/18/2021 08:07   Prostatic Specific Antigen Tot Latest Ref Range: 0.00 - 4.00 ng/mL 0.01 0.02 0.13           Carotid artery stenosis  Carotid Duplex (5/2018):  Probable distal right internal carotid occlusion, chronic. No changes from 2017. Mild stenosis of the left internal carotid (< 50%).  Flow within both subclavian arteries appears to be within normal limits. Antegrade flow, bilateral vertebral arteries.     Vascular surgery recommended medical management, he continues to follow with cardiology for a history of CABG. He is stable on Plavix and statin therapy.       History of papillary adenocarcinoma of thyroid  Thyroid nodules discovered as an incidental finding on carotid imaging. He is status post total thyroidectomy on 10/23/18.  He completed a whole body iodine scan on 12/13/19 which did not show metastatic thyroid cancer. He follows with endocrinology who are working to adjust his levothyroxine based on hormone levels.     He denies any signs or symptoms or over-treatment or under-treatment at this time.       Postoperative hypothyroidism  controlled on levothyroxine 125 mcg daily       Ref. Range 2/18/2021 08:07   TSH Latest Ref Range: 0.380 - 5.330 uIU/mL 0.480   T3 Latest Ref Range: 60.0 - 181.0 ng/dL 88.4       Macrocytosis without anemia  Macrocytosis  TSH, B12, folate okay.   Does not drink ETOH   Ref. Range 2/18/2021 08:07   WBC Latest Ref Range: 4.8 - 10.8 K/uL 4.7 (L)   RBC Latest Ref Range: 4.70 - 6.10 M/uL 4.57 (L)   Hemoglobin Latest Ref Range: 14.0 - 18.0 g/dL 15.7   Hematocrit Latest Ref Range: 42.0 - 52.0 % 46.2   MCV Latest Ref Range: 81.4 - 97.8 fL  101.1 (H)   MCH Latest Ref Range: 27.0 - 33.0 pg 34.4 (H)   MCHC Latest Ref Range: 33.7 - 35.3 g/dL 34.0   RDW Latest Ref Range: 35.9 - 50.0 fL 44.2   Platelet Count Latest Ref Range: 164 - 446 K/uL 217   MPV Latest Ref Range: 9.0 - 12.9 fL 11.2         Essential hypertension  Stable on losartan and metoprolol.    Mixed hyperlipidemia  Stable on atorvastatin.      Current Outpatient Medications   Medication Sig Dispense Refill   • levothyroxine (SYNTHROID) 125 MCG Tab Take 1 tablet by mouth Every morning on an empty stomach. 90 tablet 3   • atorvastatin (LIPITOR) 10 MG Tab Take 1 tablet by mouth every day. TAKE 1 TAB BY MOUTH EVERY DAY. 100 tablet 3   • losartan (COZAAR) 100 MG Tab Take 1 tablet by mouth every day. 100 tablet 3   • metoprolol tartrate (LOPRESSOR) 25 MG Tab Take 1 tablet by mouth 2 times a day. TAKE 1 TABLET BY MOUTH TWICE A  tablet 2   • clopidogrel (PLAVIX) 75 MG Tab TAKE 1 TABLET BY MOUTH EVERY  tablet 3   • Multiple Vitamins-Minerals (MULTIVITAMIN ADULT PO) Take 1 Tab by mouth every day.     • Cyanocobalamin (VITAMIN B-12) 1000 MCG Tab Take 500 mcg by mouth every day.     • Calcium 600 MG Tab Take 1 Tab by mouth every day.       No current facility-administered medications for this visit.       Allergies, past medical history, past surgical history, medications, family history, social history reviewed and updated.    ROS   Constitutional: Denies fevers or chills  Eyes: Denies changes in vision  Ears/Nose/Throat/Mouth: Denies nasal congestion or sore throat   Cardiovascular: Denies chest pain or palpitations   Respiratory: Denies shortness of breath , Denies cough  Gastrointestinal/Hepatic: Denies abd pain, nausea, vomiting   Genitourinary: Denies dysuria or frequency  Musculoskeletal/Rheum: Denies joint pain and swelling   Neurological: Denies headache  Psychiatric: Denies mood disorder   Endocrine: hypothyroidism Denies hx of diabetes  Heme/Oncology/Lymph Nodes: Denies weight  "changes or enlarged LNs.    Physical Exam  Vitals: /62 (BP Location: Left arm, Patient Position: Sitting, BP Cuff Size: Adult)   Pulse 62   Temp 36.8 °C (98.3 °F) (Temporal)   Resp 18   Ht 1.74 m (5' 8.5\")   Wt 88 kg (194 lb 0.1 oz)   SpO2 97%   BMI 29.07 kg/m²   General: Alert, pleasant, NAD  HEENT: Normocephalic.  EOMI, no icterus or pallor.  Conjunctivae and lids normal. External ears normal. Oropharynx non-erythematous, mucous membranes moist.  Neck supple.  No thyromegaly or masses palpated.   Lymph: No cervical or supraclavicular lymphadenopathy.  Cardiovascular: Regular rate and rhythm.   Respiratory: Normal respiratory effort.  Clear to auscultation bilaterally.  Abdomen: Non-distended, soft, non-tender  Skin: Warm, dry, no rashes.  Musculoskeletal: Gait is normal.  Moves all extremities well.  Extremities: No leg edema.     Psych:  Affect/mood is normal, judgement is good, memory is intact, grooming is appropriate.      Labs (2/18/21) were reviewed and discussed with patients.  All questions were answered.       Ref. Range 2/18/2021 08:07   WBC Latest Ref Range: 4.8 - 10.8 K/uL 4.7 (L)   RBC Latest Ref Range: 4.70 - 6.10 M/uL 4.57 (L)   Hemoglobin Latest Ref Range: 14.0 - 18.0 g/dL 15.7   Hematocrit Latest Ref Range: 42.0 - 52.0 % 46.2   MCV Latest Ref Range: 81.4 - 97.8 fL 101.1 (H)   MCH Latest Ref Range: 27.0 - 33.0 pg 34.4 (H)   MCHC Latest Ref Range: 33.7 - 35.3 g/dL 34.0   RDW Latest Ref Range: 35.9 - 50.0 fL 44.2   Platelet Count Latest Ref Range: 164 - 446 K/uL 217   MPV Latest Ref Range: 9.0 - 12.9 fL 11.2   Neutrophils-Polys Latest Ref Range: 44.00 - 72.00 % 62.50   Neutrophils (Absolute) Latest Ref Range: 1.82 - 7.42 K/uL 2.92   Lymphocytes Latest Ref Range: 22.00 - 41.00 % 28.50   Lymphs (Absolute) Latest Ref Range: 1.00 - 4.80 K/uL 1.33   Monocytes Latest Ref Range: 0.00 - 13.40 % 6.20   Monos (Absolute) Latest Ref Range: 0.00 - 0.85 K/uL 0.29   Eosinophils Latest Ref Range: 0.00 - " 6.90 % 1.30   Eos (Absolute) Latest Ref Range: 0.00 - 0.51 K/uL 0.06   Basophils Latest Ref Range: 0.00 - 1.80 % 1.10   Baso (Absolute) Latest Ref Range: 0.00 - 0.12 K/uL 0.05   Immature Granulocytes Latest Ref Range: 0.00 - 0.90 % 0.40   Immature Granulocytes (abs) Latest Ref Range: 0.00 - 0.11 K/uL 0.02   Nucleated RBC Latest Units: /100 WBC 0.00   NRBC (Absolute) Latest Units: K/uL 0.00   Sodium Latest Ref Range: 135 - 145 mmol/L 141   Potassium Latest Ref Range: 3.6 - 5.5 mmol/L 4.0   Chloride Latest Ref Range: 96 - 112 mmol/L 103   Co2 Latest Ref Range: 20 - 33 mmol/L 28   Anion Gap Latest Ref Range: 7.0 - 16.0  10.0   Glucose Latest Ref Range: 65 - 99 mg/dL 101 (H)   Bun Latest Ref Range: 8 - 22 mg/dL 16   Creatinine Latest Ref Range: 0.50 - 1.40 mg/dL 0.90   GFR If  Latest Ref Range: >60 mL/min/1.73 m 2 >60   GFR If Non  Latest Ref Range: >60 mL/min/1.73 m 2 >60   Calcium Latest Ref Range: 8.5 - 10.5 mg/dL 9.4   AST(SGOT) Latest Ref Range: 12 - 45 U/L 19   ALT(SGPT) Latest Ref Range: 2 - 50 U/L 20   Alkaline Phosphatase Latest Ref Range: 30 - 99 U/L 55   Total Bilirubin Latest Ref Range: 0.1 - 1.5 mg/dL 0.5   Albumin Latest Ref Range: 3.2 - 4.9 g/dL 4.3   Total Protein Latest Ref Range: 6.0 - 8.2 g/dL 7.2   Globulin Latest Ref Range: 1.9 - 3.5 g/dL 2.9   A-G Ratio Latest Units: g/dL 1.5   Fasting Status Unknown Fasting   Cholesterol,Tot Latest Ref Range: 100 - 199 mg/dL 158   Triglycerides Latest Ref Range: 0 - 149 mg/dL 96   HDL Latest Ref Range: >=40 mg/dL 46   LDL Latest Ref Range: <100 mg/dL 93   Folate -Folic Acid Latest Ref Range: >4.0 ng/mL 29.2   Prostatic Specific Antigen Tot Latest Ref Range: 0.00 - 4.00 ng/mL 0.13   Vitamin B12 -True Cobalamin Latest Ref Range: 211 - 911 pg/mL 1711 (H)   TSH Latest Ref Range: 0.380 - 5.330 uIU/mL 0.480   T3 Latest Ref Range: 60.0 - 181.0 ng/dL 88.4   Thyroglobulin by LC-MC/MS-S/P Latest Ref Range: 1.3 - 31.8 ng/mL Not Applicable    Thyroglobulin Latest Ref Range: 1.3 - 31.8 ng/mL 0.2 (L)   Anti-Thyroglobulin Latest Ref Range: 0.0 - 4.0 IU/mL <0.9     Assessment and Plan    1. Postoperative hypothyroidism  - REFERRAL TO ENDOCRINOLOGY  - levothyroxine (SYNTHROID) 125 MCG Tab; Take 1 tablet by mouth Every morning on an empty stomach.  Dispense: 90 tablet; Refill: 3    2. History of papillary adenocarcinoma of thyroid  - REFERRAL TO ENDOCRINOLOGY    3. Macrocytosis without anemia  - REFERRAL TO HEMATOLOGY ONCOLOGY  B12, folate, TSH ok  Not using ETOH    4. Mixed hyperlipidemia  - atorvastatin (LIPITOR) 10 MG Tab; Take 1 tablet by mouth every day. TAKE 1 TAB BY MOUTH EVERY DAY.  Dispense: 100 tablet; Refill: 3    5. Essential hypertension  - losartan (COZAAR) 100 MG Tab; Take 1 tablet by mouth every day.  Dispense: 100 tablet; Refill: 3  - metoprolol tartrate (LOPRESSOR) 25 MG Tab; Take 1 tablet by mouth 2 times a day. TAKE 1 TABLET BY MOUTH TWICE A DAY  Dispense: 200 tablet; Refill: 2    6. Coronary artery disease involving native coronary artery of native heart without angina pectoris  7. S/P CABG (coronary artery bypass graft)  8. Nonrheumatic aortic valve stenosis  Continue current regimen follow-up with cardiology.    9. H/O prostatectomy  10. History of prostate cancer  Monitor PSA     11. Bilateral carotid artery stenosis  Follow with cardiology  On plavix and statin    12. Screening for colon cancer  - REFERRAL TO GI FOR COLONOSCOPY        Follow-up:Return in about 6 months (around 8/23/2021), or if symptoms worsen or fail to improve.    This note was created using voice recognition software. There may be unintended errors in spelling, grammar or content.

## 2021-02-23 NOTE — ASSESSMENT & PLAN NOTE
controlled on levothyroxine 125 mcg daily       Ref. Range 2/18/2021 08:07   TSH Latest Ref Range: 0.380 - 5.330 uIU/mL 0.480   T3 Latest Ref Range: 60.0 - 181.0 ng/dL 88.4

## 2021-02-23 NOTE — ASSESSMENT & PLAN NOTE
Thyroid nodules discovered as an incidental finding on carotid imaging. He is status post total thyroidectomy on 10/23/18.  He completed a whole body iodine scan on 12/13/19 which did not show metastatic thyroid cancer. He follows with endocrinology who are working to adjust his levothyroxine based on hormone levels.     He denies any signs or symptoms or over-treatment or under-treatment at this time.

## 2021-02-23 NOTE — ASSESSMENT & PLAN NOTE
chronic, status post prostatectomy due to prostate cancer, will monitor PSA has history of urinary incontinence using pads   Ref. Range 12/2/2014 06:59 3/8/2017 08:53 2/18/2021 08:07   Prostatic Specific Antigen Tot Latest Ref Range: 0.00 - 4.00 ng/mL 0.01 0.02 0.13

## 2021-02-23 NOTE — ASSESSMENT & PLAN NOTE
He was diagnosed in 2002 and underwent total prostatectomy due to progression in 2008. He has post-operative incontinence for which he uses pads and briefs.     PSA 0.02 in 3/2017     Ref. Range 12/2/2014 06:59 3/8/2017 08:53 2/18/2021 08:07   Prostatic Specific Antigen Tot Latest Ref Range: 0.00 - 4.00 ng/mL 0.01 0.02 0.13

## 2021-02-23 NOTE — ASSESSMENT & PLAN NOTE
Macrocytosis  TSH, B12, folate okay.   Does not drink ETOH   Ref. Range 2/18/2021 08:07   WBC Latest Ref Range: 4.8 - 10.8 K/uL 4.7 (L)   RBC Latest Ref Range: 4.70 - 6.10 M/uL 4.57 (L)   Hemoglobin Latest Ref Range: 14.0 - 18.0 g/dL 15.7   Hematocrit Latest Ref Range: 42.0 - 52.0 % 46.2   MCV Latest Ref Range: 81.4 - 97.8 fL 101.1 (H)   MCH Latest Ref Range: 27.0 - 33.0 pg 34.4 (H)   MCHC Latest Ref Range: 33.7 - 35.3 g/dL 34.0   RDW Latest Ref Range: 35.9 - 50.0 fL 44.2   Platelet Count Latest Ref Range: 164 - 446 K/uL 217   MPV Latest Ref Range: 9.0 - 12.9 fL 11.2

## 2021-04-20 ENCOUNTER — HOSPITAL ENCOUNTER (OUTPATIENT)
Dept: RADIOLOGY | Facility: MEDICAL CENTER | Age: 76
End: 2021-04-20
Attending: INTERNAL MEDICINE
Payer: MEDICARE

## 2021-04-20 DIAGNOSIS — I65.23 BILATERAL CAROTID ARTERY STENOSIS: ICD-10-CM

## 2021-04-20 PROCEDURE — 93880 EXTRACRANIAL BILAT STUDY: CPT

## 2021-04-21 ENCOUNTER — TELEPHONE (OUTPATIENT)
Dept: CARDIOLOGY | Facility: MEDICAL CENTER | Age: 76
End: 2021-04-21

## 2021-04-21 DIAGNOSIS — I10 ESSENTIAL HYPERTENSION: ICD-10-CM

## 2021-04-21 DIAGNOSIS — I65.23 BILATERAL CAROTID ARTERY STENOSIS: ICD-10-CM

## 2021-04-22 ENCOUNTER — HOSPITAL ENCOUNTER (OUTPATIENT)
Dept: LAB | Facility: MEDICAL CENTER | Age: 76
End: 2021-04-22
Attending: INTERNAL MEDICINE
Payer: MEDICARE

## 2021-04-22 DIAGNOSIS — I65.23 BILATERAL CAROTID ARTERY STENOSIS: ICD-10-CM

## 2021-04-22 DIAGNOSIS — I10 ESSENTIAL HYPERTENSION: ICD-10-CM

## 2021-04-22 LAB
ANION GAP SERPL CALC-SCNC: 10 MMOL/L (ref 7–16)
BUN SERPL-MCNC: 15 MG/DL (ref 8–22)
CALCIUM SERPL-MCNC: 9.3 MG/DL (ref 8.5–10.5)
CHLORIDE SERPL-SCNC: 103 MMOL/L (ref 96–112)
CO2 SERPL-SCNC: 26 MMOL/L (ref 20–33)
CREAT SERPL-MCNC: 1.05 MG/DL (ref 0.5–1.4)
GLUCOSE SERPL-MCNC: 80 MG/DL (ref 65–99)
POTASSIUM SERPL-SCNC: 3.9 MMOL/L (ref 3.6–5.5)
SODIUM SERPL-SCNC: 139 MMOL/L (ref 135–145)

## 2021-04-22 PROCEDURE — 36415 COLL VENOUS BLD VENIPUNCTURE: CPT

## 2021-04-22 PROCEDURE — 80048 BASIC METABOLIC PNL TOTAL CA: CPT

## 2021-04-22 NOTE — TELEPHONE ENCOUNTER
Message  Received: Today  Message Contents   Nahun Garcia M.D.  Farnaz Mills R.N.   Please schedule a CTA with contrast to assess for significant  carotid artery stenosis as recommended by the radiologist per findings on the /carotid ultrasound. Thanks.  CHERY.   --------------------------------------------------------------------    S/w pt and explained results and Dr. Garcia's recommendations above. Pt agrees with plan. Provided imaging contact number to arrange imaging.   CTA with contrast ordered.

## 2021-04-22 NOTE — TELEPHONE ENCOUNTER
Pt called back stating he was told he needed to have lab work done before he gets the CTA done on 4/29. Pt states he will get the lab work done at a [facility] location. If any questions please call Pt back at [phone].    Thank you

## 2021-04-23 ENCOUNTER — HOSPITAL ENCOUNTER (OUTPATIENT)
Dept: CARDIOLOGY | Facility: MEDICAL CENTER | Age: 76
End: 2021-04-23
Attending: INTERNAL MEDICINE
Payer: MEDICARE

## 2021-04-23 DIAGNOSIS — I35.0 NONRHEUMATIC AORTIC VALVE STENOSIS: ICD-10-CM

## 2021-04-23 PROCEDURE — 93306 TTE W/DOPPLER COMPLETE: CPT

## 2021-04-26 LAB
LV EJECT FRACT  99904: 65
LV EJECT FRACT MOD 2C 99903: 71.86
LV EJECT FRACT MOD 4C 99902: 70.21
LV EJECT FRACT MOD BP 99901: 71.38

## 2021-04-26 PROCEDURE — 93306 TTE W/DOPPLER COMPLETE: CPT | Mod: 26 | Performed by: INTERNAL MEDICINE

## 2021-04-29 ENCOUNTER — HOSPITAL ENCOUNTER (OUTPATIENT)
Dept: RADIOLOGY | Facility: MEDICAL CENTER | Age: 76
End: 2021-04-29
Attending: INTERNAL MEDICINE
Payer: MEDICARE

## 2021-04-29 DIAGNOSIS — I65.23 BILATERAL CAROTID ARTERY STENOSIS: ICD-10-CM

## 2021-04-29 PROCEDURE — 70498 CT ANGIOGRAPHY NECK: CPT | Mod: MG

## 2021-04-29 PROCEDURE — 700117 HCHG RX CONTRAST REV CODE 255: Performed by: INTERNAL MEDICINE

## 2021-04-29 RX ADMIN — IOHEXOL 80 ML: 350 INJECTION, SOLUTION INTRAVENOUS at 13:46

## 2021-05-03 ENCOUNTER — PATIENT MESSAGE (OUTPATIENT)
Dept: CARDIOLOGY | Facility: MEDICAL CENTER | Age: 76
End: 2021-05-03

## 2021-05-03 DIAGNOSIS — I65.23 BILATERAL CAROTID ARTERY STENOSIS: ICD-10-CM

## 2021-05-03 DIAGNOSIS — I25.10 CORONARY ARTERY DISEASE INVOLVING NATIVE CORONARY ARTERY OF NATIVE HEART WITHOUT ANGINA PECTORIS: ICD-10-CM

## 2021-05-03 DIAGNOSIS — E78.2 MIXED HYPERLIPIDEMIA: ICD-10-CM

## 2021-05-04 ENCOUNTER — DOCUMENTATION (OUTPATIENT)
Dept: CARDIOLOGY | Facility: MEDICAL CENTER | Age: 76
End: 2021-05-04

## 2021-05-04 ENCOUNTER — OFFICE VISIT (OUTPATIENT)
Dept: CARDIOLOGY | Facility: MEDICAL CENTER | Age: 76
End: 2021-05-04
Payer: MEDICARE

## 2021-05-04 VITALS
SYSTOLIC BLOOD PRESSURE: 140 MMHG | DIASTOLIC BLOOD PRESSURE: 80 MMHG | HEIGHT: 68 IN | BODY MASS INDEX: 29.16 KG/M2 | HEART RATE: 64 BPM | RESPIRATION RATE: 14 BRPM | WEIGHT: 192.4 LBS | OXYGEN SATURATION: 95 %

## 2021-05-04 DIAGNOSIS — I25.10 CORONARY ARTERY DISEASE INVOLVING NATIVE CORONARY ARTERY OF NATIVE HEART WITHOUT ANGINA PECTORIS: ICD-10-CM

## 2021-05-04 DIAGNOSIS — I65.23 BILATERAL CAROTID ARTERY STENOSIS: ICD-10-CM

## 2021-05-04 DIAGNOSIS — E78.2 MIXED HYPERLIPIDEMIA: ICD-10-CM

## 2021-05-04 DIAGNOSIS — I10 ESSENTIAL HYPERTENSION: ICD-10-CM

## 2021-05-04 DIAGNOSIS — Z95.1 S/P CABG (CORONARY ARTERY BYPASS GRAFT): ICD-10-CM

## 2021-05-04 PROCEDURE — 99214 OFFICE O/P EST MOD 30 MIN: CPT | Performed by: INTERNAL MEDICINE

## 2021-05-04 RX ORDER — ATORVASTATIN CALCIUM 20 MG/1
20 TABLET, FILM COATED ORAL DAILY
Qty: 90 TABLET | Refills: 3 | Status: SHIPPED | OUTPATIENT
Start: 2021-05-04 | End: 2021-05-05 | Stop reason: SDUPTHER

## 2021-05-04 ASSESSMENT — ENCOUNTER SYMPTOMS
EYES NEGATIVE: 1
CLAUDICATION: 0
SHORTNESS OF BREATH: 0
HEADACHES: 0
DIZZINESS: 0
CONSTITUTIONAL NEGATIVE: 1
FOCAL WEAKNESS: 0
FEVER: 0
COUGH: 0
RESPIRATORY NEGATIVE: 1
GASTROINTESTINAL NEGATIVE: 1
NAUSEA: 0
NERVOUS/ANXIOUS: 0
BLURRED VISION: 0
DEPRESSION: 0
VOMITING: 0
DOUBLE VISION: 0
WEAKNESS: 0
MYALGIAS: 0
NEUROLOGICAL NEGATIVE: 1
MUSCULOSKELETAL NEGATIVE: 1
WEIGHT LOSS: 0
PSYCHIATRIC NEGATIVE: 1
PALPITATIONS: 0
CHILLS: 0
BRUISES/BLEEDS EASILY: 0
CARDIOVASCULAR NEGATIVE: 1
ABDOMINAL PAIN: 0

## 2021-05-04 ASSESSMENT — FIBROSIS 4 INDEX: FIB4 SCORE: 1.43

## 2021-05-04 NOTE — PROGRESS NOTES
Chief Complaint   Patient presents with   • Coronary Artery Disease     F/V Coronary artery disease involving native coronary artery of native heart without angina pectoris   • Carotid Artery Stenosis     F/V   • Hypertension     F/V Dx: Essential hypertension       Subjective:   Ed Ed Becerra is a 75 y.o. male who presents today for follow up of aortic stenosis, coronary artery disease with prior coronary artery bypass graft surgery, carotid artery stenosis study result review.    Since the patient's last visit on 10/19/20, he has been doing well clinically. He denies chest pain, shortness of breath, palpitations, nausea/vomiting or diaphoresis.    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/18/2018   • High cholesterol    • Hyperlipidemia    • Hypertension    • Unspecified cataract    • Unspecified urinary incontinence 10/18/2018   • Valvular heart disease      Past Surgical History:   Procedure Laterality Date   • THYROIDECTOMY TOTAL  10/23/2018    Procedure: THYROIDECTOMY TOTAL;  Surgeon: nAgel Adams M.D.;  Location: [facility];  Service: Ent   • NECK DISSECTION  10/23/2018    Procedure: NECK DISSECTION - POSSIBLE LIMITED CENTRAL;  Surgeon: Angel Adams M.D.;  Location: [facility];  Service: Ent   • CATARACT PHACO WITH IOL Left 7/2/2015    Procedure: CATARACT PHACO WITH IOL;  Surgeon: Matty Augustin M.D.;  Location: [facility];  Service:    • CATARACT PHACO WITH IOL Right 6/4/2015    Procedure: CATARACT PHACO WITH IOL;  Surgeon: Matty Augustin M.D.;  Location: [facility];  Service:    • MULTIPLE CORONARY ARTERY BYPASS ENDO VEIN HARVEST  12/15/2014    Performed by Gustavo Richardson M.D. at [facility]   • PROSTATECTOMY, RADICAL RETRO  12/17/2008     Family History   Problem Relation Age of  Onset   • Cancer Sister         colon   • Cancer Brother         prostate   • Heart Disease Mother    • Arthritis Mother    • Hypertension Mother    • Hyperlipidemia Mother    • Stroke Mother    • Heart Disease Father    • Hypertension Father    • Hyperlipidemia Father    • Stroke Father    • Arthritis Brother    • Hyperlipidemia Brother    • Diabetes Neg Hx      Social History     Socioeconomic History   • Marital status:      Spouse name: Not on file   • Number of children: Not on file   • Years of education: Not on file   • Highest education level: Bachelor's degree (e.g., BA, AB, BS)   Occupational History   • Not on file   Tobacco Use   • Smoking status: Never Smoker   • Smokeless tobacco: Never Used   • Tobacco comment: continued abstinence   Substance and Sexual Activity   • Alcohol use: No     Alcohol/week: 0.0 oz   • Drug use: No   • Sexual activity: Not Currently     Partners: Female     Comment: s/p prostatectomy   Other Topics Concern   • Not on file   Social History Narrative    Clint was born and raised in the [facility]. He has been in [place] since 1989. He continues to work doing taxes, he is a CPA. He is  to [place] for 50 years, they met in college. They have 3 kids- Larissa (49; [place]), Marina (46; [place]), Atif (39; [place]). They enjoy spending times with their grandkids, 4 in [place] and 2 in [place].      Social Determinants of Health     Financial Resource Strain: Low Risk    • Difficulty of Paying Living Expenses: Not very hard   Food Insecurity: No Food Insecurity   • Worried About Running Out of Food in the Last Year: Never true   • Ran Out of Food in the Last Year: Never true   Transportation Needs: No Transportation Needs   • Lack of Transportation (Medical): No   • Lack of Transportation (Non-Medical): No   Physical Activity: Insufficiently Active   • Days of Exercise per Week: 3 days   • Minutes of Exercise per Session: 10 min   Stress: No Stress Concern Present   • Feeling of Stress  : Only a little   Social Connections: Slightly Isolated   • Frequency of Communication with Friends and Family: More than three times a week   • Frequency of Social Gatherings with Friends and Family: Once a week   • Attends [employer] Services: More than 4 times per year   • Active Member of Clubs or Organizations: No   • Attends Club or Organization Meetings: Never   • Marital Status:    Intimate Partner Violence:    • Fear of Current or Ex-Partner:    • Emotionally Abused:    • Physically Abused:    • Sexually Abused:      No Known Allergies     (Medications reviewed.)  Outpatient Encounter Medications as of 5/4/2021   Medication Sig Dispense Refill   • levothyroxine (SYNTHROID) 125 MCG Tab Take 1 tablet by mouth Every morning on an empty stomach. 90 tablet 3   • atorvastatin (LIPITOR) 10 MG Tab Take 1 tablet by mouth every day. TAKE 1 TAB BY MOUTH EVERY DAY. 100 tablet 3   • losartan (COZAAR) 100 MG Tab Take 1 tablet by mouth every day. 100 tablet 3   • metoprolol tartrate (LOPRESSOR) 25 MG Tab Take 1 tablet by mouth 2 times a day. TAKE 1 TABLET BY MOUTH TWICE A  tablet 2   • clopidogrel (PLAVIX) 75 MG Tab TAKE 1 TABLET BY MOUTH EVERY  tablet 3   • Multiple Vitamins-Minerals (MULTIVITAMIN ADULT PO) Take 1 Tab by mouth every day.     • Calcium 600 MG Tab Take 1 Tab by mouth every day.     • [DISCONTINUED] Cyanocobalamin (VITAMIN B-12) 1000 MCG Tab Take 500 mcg by mouth every day.       No facility-administered encounter medications on file as of 5/4/2021.     Review of Systems   Constitutional: Negative.  Negative for chills, fever, malaise/fatigue and weight loss.   HENT: Negative.  Negative for hearing loss.    Eyes: Negative.  Negative for blurred vision and double vision.   Respiratory: Negative.  Negative for cough and shortness of breath.    Cardiovascular: Negative.  Negative for chest pain, palpitations, claudication and leg swelling.   Gastrointestinal: Negative.  Negative for  "abdominal pain, nausea and vomiting.   Genitourinary: Negative.  Negative for dysuria and urgency.   Musculoskeletal: Negative.  Negative for joint pain and myalgias.   Skin: Negative.  Negative for itching and rash.   Neurological: Negative.  Negative for dizziness, focal weakness, weakness and headaches.   Endo/Heme/Allergies: Negative.  Does not bruise/bleed easily.   Psychiatric/Behavioral: Negative.  Negative for depression. The patient is not nervous/anxious.         Objective:   /80 (BP Location: Left arm, Patient Position: Sitting, BP Cuff Size: Adult)   Pulse 64   Resp 14   Ht 1.727 m (5' 8\")   Wt 87.3 kg (192 lb 6.4 oz)   SpO2 95%   BMI 29.25 kg/m²     Physical Exam   Constitutional: He is oriented to person, place, and time. He appears well-developed and well-nourished.   HENT:   Head: Normocephalic and atraumatic.   Eyes: EOM are normal.   Neck: No JVD present.   Cardiovascular: Normal rate, regular rhythm and normal heart sounds.   Pulmonary/Chest: Effort normal and breath sounds normal.   Abdominal: Soft. Bowel sounds are normal.   No hepatosplenomegaly.   Musculoskeletal:         General: Normal range of motion.   Lymphadenopathy:     He has no cervical adenopathy.   Neurological: He is alert and oriented to person, place, and time.   Skin: Skin is warm and dry.   Psychiatric: He has a normal mood and affect.     CARDIAC STUDIES/PROCEDURES:     CARDIAC CATHETERIZATION CONCLUSIONS (12/13/14)  1. Two-vessel coronary artery disease with mid left anterior descending   artery, ostial diagonal branch and proximal circumflex artery stenosis.   2. Normal left ventricular systolic function with ejection fraction of 55%.   3. Mildly elevated left ventricular end diastolic pressure.   4. Mild aortic stenosis.     CAROTID ULTRASOUND (04/20/21)  1.  There is moderate amount of atherosclerotic plaque.  Plaque is located in carotid bulbs and proximal internal carotid arteries.  Plaque characterization:  " Heterogeneous, partially calcified  2.  There is no evidence of carotid occlusion   3. Vertebral arteries demonstrate antegrade flow.  4. Diameter reduction in the internal carotid arteries: less than 50% where visualized. However, there is markedly decreased flow in the right ICA, with parvus-tardus waveform, suggestive of high-grade upstream ICA stenosis, most likely in the region of   right common carotid bifurcation. Consider further evaluation with neck CTA.  (study result reviewed)    CAROTID ULTRASOUND (05/17/18)  Probable distal right internal carotid occlusion, chronic. No changes from 2017.  Mild stenosis of the left internal carotid (< 50%).   Flow within both subclavian arteries appears to be within normal limits.   Antegrade flow, bilateral vertebral arteries.   Incidental left-sided thyroid cyst noted.  Follow as indicated clinically.     CAROTID ULTRASOUND (12/13/14)  RIGHT:  Very mild smooth plaque of the common carotids & bifurcation with minimal   plaque in the proximal internal carotid artery. The ICA waveforms are   resistive with low peak systolic & end diastolic velocities which may   suggest distal obstruction or occlusion.  LEFT:  Very mild smooth plaque of the common carotids & bifurcation extending into   the internal carotid. Velocities are consistent with < 50% stenosis of the   internal carotid artery.   Bilateral subclavian and vertebral artery waveforms are antegrade and   waveforms are normal in character and velocity.      CT OF HEAD (12/18/14)  1. Occlusion or near occlusion of the intracranial right internal carotid artery with apparent tiny branch extending to the middle cerebral artery. The right middle cerebral artery appears to be supplied primarily by the anterior communicating artery.    CTA OF NECK (04/29/21)  1.  CTA neck stable since 12/14/2014  2.  Long segment approximately 80% stenosis of the right internal carotid artery extending from approximately 2 cm distal to the  carotid bifurcation to the skull base. The supraclinoid portion has either occlusion with reconstitution or a very high-grade stenosis with reconstitution  3.  Findings are consistent with either a chronic dissection or atherosclerotic narrowing  4.  No other significant finding  (study result reviewed)     CT OF NECK (12/18/14)  1. Diffuse narrowing of the right internal carotid artery beginning approximately 2 cm distal to its origin could be due to diffuse atherosclerotic disease or chronic dissection.  2. Occlusion or near occlusion of the intracranial portion of the right internal carotid artery.  3. No evidence of left internal carotid artery stenosis.    ECHOCARDIOGRAM CONCLUSIONS (04/23/21)  Prior echo 05/17/18. Compared to the report of the study done - there   has been no significant change.   Normal left ventricular systolic function.  Left ventricular ejection fraction is visually estimated to be 65%.  Mild mitral regurgitation.  Mild aortic stenosis.  Vmax is  2.3 m/s. Transvalvular gradients are - Peak: 22 mmHg, Mean: 14 mmHg.   Mild tricuspid regurgitation.  Estimated right ventricular systolic pressure is 35 mmHg.  (study result reviewed)     ECHOCARDIOGRAM CONCLUSIONS (05/17/18)  Prior echo 5-18-17. Compared to the report of the study done - there has been no significant change.   Normal left ventricular systolic function.  Left ventricular ejection fraction is visually estimated to be 60%.  Grade II diastolic dysfunction.  Mild mitral regurgitation.  Aortic sclerosis with mild stenosis.  Vmax is 2.01 m/s. Transvalvular gradients are - Peak: 16 mmHg,  Mean: 9 mmHg.  Mild tricuspid regurgitation.  Estimated right ventricular systolic pressure is 45 mmHg.     ECHOCARDIOGRAM CONCLUSIONS (05/17/18)  Prior echo 5-18-17. Compared to the report of the study done - there has been no significant change.   Normal left ventricular systolic function.  Left ventricular ejection fraction is visually estimated to  be 60%.  Grade II diastolic dysfunction.  Mild mitral regurgitation.  Aortic sclerosis with mild stenosis.  Vmax is 2.01 m/s. Transvalvular gradients are - Peak: 16 mmHg, Mean: 9 mmHg.  Mild tricuspid regurgitation.  Estimated right ventricular systolic pressure is 45 mmHg.     ECHOCARDIOGRAM CONCLUSIONS (05/18/17)  Compared to the prior echo dated 7/9/15, the aortic valve still appears mildly stenotic.  1. Left ventricular ejection fraction is visually estimated to be 60%.   Grade II diastolic dysfunction.  2. There is mild aortic stenosis.  AV Peak Velocity 1.9 m/s                 AV Peak Gradient 14.6 mmHg               AV Mean Gradient 8 mmHg   3. Estimated right ventricular systolic pressure  is 30 mmHg.     ECHOCARDIOGRAM CONCLUSIONS (07/09/15)  Normal left ventricular systolic function.  Moderate concentric left ventricular hypertrophy.  Normal diastolic function - normal mitral inflow pattern and E/E' is normal.  Mitral annular calcification.  Mild mitral regurgitation.  Mild aortic stenosis.  Mild tricuspid regurgitation.     ECHOCARDIOGRAM CONCLUSIONS (12/12/14)  Technically good study.  Normal left ventricular systolic function.  Left ventricular ejection fraction is 60% to 65%.  Grade I diastolic dysfunction - mitral inflow E/A is <1.0.  Mild mitral regurgitation.  Mild aortic stenosis.  Mild tricuspid regurgitation.     EKG performed on (12/18/14) EKG shows atrial fibrillation. (Post CABG)  EKG performed on (12/13/14) EKG shows normal sinus rhythm.     Laboratory results of (02/18/21) were reviewed. Cholesterol profile of 158/96/46/93 mg/dL noted.  Laboratory results of (12/20/19) Cholesterol profile of 141/118/44/73 mg/dl noted.  Laboratory results of (07/20/19) Cholesterol profile of 145/78/53/76 noted.  Laboratory results of (09/11/18) Cholesterol profile of 152/82/48/88 noted.  Laboratory results of (03/13/18) Cholesterol profile of 141/78/51/74 noted.  Laboratory results of (03/08/17) Cholesterol  profile of 158/97/58/81 noted.  Laboratory results of (06/03/16) Cholesterol profile of 106/102/45/41 noted.  Laboratory results of (12/13/14) Cholesterol profile of 139/98/46/73 noted.     MYOCARDIAL PERFUSION STUDY CONCLUSIONS (09/17/19)  No evidence of significant jeopardized viable myocardium or prior myocardial infarction.  Normal left ventricular size, ejection fraction, and wall motion.  Nondiagnostic stress test.   ECG INTERPRETATION  Negative stress ECG for ischemia.     Assessment:     1. Coronary artery disease involving native coronary artery of native heart without angina pectoris     2. S/P CABG (coronary artery bypass graft)     3. Essential hypertension     4. Mixed hyperlipidemia     5. Bilateral carotid artery stenosis       Medical Decision Making:  Today's Assessment / Status / Plan:     1. Coronary artery disease with prior coronary bypass graft (x 3 with left internal mammary artery graft to left anterior descending artery, saphenous vein graft to diagonal branch, saphenous vein graft to distal obtuse marginal branch by Dr. Richardson 12/15/14): He is clinically doing well. I will continue with current medical care including clopidogrel, metoprolol, losartan and atorvastatin.  2. Aortic stenosis: He remains asymptomatic with mild aortic stenosis. We will follow him clinically and repeat an echocardiogram in one year.  3. Hypertension: Blood pressure is well controlled. We will continue with beta blockade therapy and angiotensin receptor blockade.  4. Hyperlipidemia: He is doing well on statin therapy without myalgia symptoms. His LDL level is not at target. We will increase atorvastatin dose to 20 mg. We will repeat labs including fasting lipid profile in 3 months.  5. 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 No intervention is warranted per Andrea Askew on 12/14/14): Clinically stable on above medical therapy. (Managed by  Vito Barone)    We will follow up in three months with labs.     CC Dr. Baez, Kulwant Nugent    Addendum: Additional records obtained.  Per Vito Barone 08/08/117: Based on the patient's asymptomatic status and the fact that the carotid artery occlusion is in the distal internal carotid artery which is surgically inaccessible I have recommended continued conservative management. The fact that he has no symptoms of TIA or stroke would led itself to a more conservative approach. I don't think that the would benefit from any type of neuro-interventional consultation based on the fact that the patient is asymptomatic.

## 2021-05-04 NOTE — PROGRESS NOTES
Per JI request for labs and last OV note from Dr. Peguero faxed over. Fax confirmation received.    Pending records.

## 2021-05-05 DIAGNOSIS — I25.10 CORONARY ARTERY DISEASE INVOLVING NATIVE CORONARY ARTERY OF NATIVE HEART WITHOUT ANGINA PECTORIS: ICD-10-CM

## 2021-05-05 RX ORDER — ATORVASTATIN CALCIUM 20 MG/1
20 TABLET, FILM COATED ORAL DAILY
Qty: 100 TABLET | Refills: 3 | Status: SHIPPED | OUTPATIENT
Start: 2021-05-05 | End: 2022-05-23

## 2021-05-14 ENCOUNTER — TELEPHONE (OUTPATIENT)
Dept: VASCULAR LAB | Facility: MEDICAL CENTER | Age: 76
End: 2021-05-14

## 2021-06-19 ENCOUNTER — OFFICE VISIT (OUTPATIENT)
Dept: URGENT CARE | Facility: CLINIC | Age: 76
End: 2021-06-19
Payer: MEDICARE

## 2021-06-19 VITALS
OXYGEN SATURATION: 98 % | HEART RATE: 62 BPM | SYSTOLIC BLOOD PRESSURE: 146 MMHG | DIASTOLIC BLOOD PRESSURE: 84 MMHG | HEIGHT: 69 IN | TEMPERATURE: 97.5 F | WEIGHT: 197 LBS | BODY MASS INDEX: 29.18 KG/M2

## 2021-06-19 DIAGNOSIS — M10.9 GOUTY ARTHRITIS OF LEFT GREAT TOE: ICD-10-CM

## 2021-06-19 PROCEDURE — 99213 OFFICE O/P EST LOW 20 MIN: CPT | Performed by: FAMILY MEDICINE

## 2021-06-19 RX ORDER — COLCHICINE 0.6 MG/1
TABLET ORAL
Qty: 10 TABLET | Refills: 0 | Status: SHIPPED | OUTPATIENT
Start: 2021-06-19 | End: 2021-06-24

## 2021-06-19 ASSESSMENT — ENCOUNTER SYMPTOMS
SORE THROAT: 0
COUGH: 0
FEVER: 0
VOMITING: 0

## 2021-06-19 ASSESSMENT — FIBROSIS 4 INDEX: FIB4 SCORE: 1.45

## 2021-06-19 NOTE — PROGRESS NOTES
Subjective:     Joshua Becerra is a 76 y.o. male who presents for Toe Injury (big toe is swollen x5 days )    HPI  Pt presents for evaluation of an acute problem  Patient with left great toe and second toe swelling for the past 5 days  Great toe swelling initially started more at the base of the great toe and is now mainly in the mid great toe   Has an ingrown toenail in great toe, however no pain in the toenail  Has erythema, swelling, and pain  Able to walk okay with mild pain  No open wounds, drainage, or bleeding    Review of Systems   Constitutional: Negative for fever.   HENT: Negative for sore throat.    Respiratory: Negative for cough.    Gastrointestinal: Negative for vomiting.   Skin: Positive for rash.     PMH:  has a past medical history of CAD (coronary artery disease), Cancer (HCC), Carotid artery disease (HCC), Disorder of thyroid (10/18/2018), High cholesterol, Hyperlipidemia, Hypertension, Unspecified cataract, Unspecified urinary incontinence (10/18/2018), and Valvular heart disease. He also has no past medical history of Encounter for long-term (current) use of other medications.  MEDS:   Current Outpatient Medications:   •  atorvastatin (LIPITOR) 20 MG Tab, Take 1 tablet by mouth every day., Disp: 100 tablet, Rfl: 3  •  levothyroxine (SYNTHROID) 125 MCG Tab, Take 1 tablet by mouth Every morning on an empty stomach., Disp: 90 tablet, Rfl: 3  •  losartan (COZAAR) 100 MG Tab, Take 1 tablet by mouth every day., Disp: 100 tablet, Rfl: 3  •  metoprolol tartrate (LOPRESSOR) 25 MG Tab, Take 1 tablet by mouth 2 times a day. TAKE 1 TABLET BY MOUTH TWICE A DAY, Disp: 200 tablet, Rfl: 2  •  clopidogrel (PLAVIX) 75 MG Tab, TAKE 1 TABLET BY MOUTH EVERY DAY, Disp: 100 tablet, Rfl: 3  •  Multiple Vitamins-Minerals (MULTIVITAMIN ADULT PO), Take 1 Tab by mouth every day., Disp: , Rfl:   •  Calcium 600 MG Tab, Take 1 Tab by mouth every day., Disp: , Rfl:   ALLERGIES: No Known Allergies  SURGHX:   Past  "Surgical History:   Procedure Laterality Date   • THYROIDECTOMY TOTAL  10/23/2018    Procedure: THYROIDECTOMY TOTAL;  Surgeon: Angel Adams M.D.;  Location: [facility];  Service: Ent   • NECK DISSECTION  10/23/2018    Procedure: NECK DISSECTION - POSSIBLE LIMITED CENTRAL;  Surgeon: Angel Adams M.D.;  Location: [facility];  Service: Ent   • CATARACT PHACO WITH IOL Left 7/2/2015    Procedure: CATARACT PHACO WITH IOL;  Surgeon: Matty Augustin M.D.;  Location: SURGERY [facility];  Service:    • CATARACT PHACO WITH IOL Right 6/4/2015    Procedure: CATARACT PHACO WITH IOL;  Surgeon: Matty Augustin M.D.;  Location: [facility];  Service:    • MULTIPLE CORONARY ARTERY BYPASS ENDO VEIN HARVEST  12/15/2014    Performed by Gustavo Richardson M.D. at [facility]   • PROSTATECTOMY, RADICAL RETRO  12/17/2008     SOCHX:  reports that he has never smoked. He has never used smokeless tobacco. He reports that he does not drink alcohol and does not use drugs.     Objective:   /84 (BP Location: Left arm, Patient Position: Sitting, BP Cuff Size: Adult)   Pulse 62   Temp 36.4 °C (97.5 °F) (Temporal)   Ht 1.74 m (5' 8.5\")   Wt 89.4 kg (197 lb)   SpO2 98%   BMI 29.52 kg/m²     Physical Exam  Constitutional:       General: He is not in acute distress.     Appearance: He is well-developed. He is not diaphoretic.   HENT:      Head: Normocephalic and atraumatic.   Pulmonary:      Effort: Pulmonary effort is normal.   Musculoskeletal:      Comments: Left great toe and second toe with erythema and swelling.  Erythema and great toe is focalized over the IPJ and erythema in the second toe is focal worst over the DIP joint.  Areas of erythema are swollen with tenderness and warmth.  No erythema of distal toe tips    Skin:     General: Skin is warm and dry.   Neurological:      Mental Status: He is alert.         Assessment/Plan:   Assessment    1. Gouty arthritis of left " great toe  - colchicine (COLCRYS) 0.6 MG Tab; Take 1 tab (0.6) BID x 1 day, then take 1 tab PO daily until resolved  Dispense: 10 tablet; Refill: 0    Patient with gout of left great toe and second toe.  Reviewed medication instructions and given close follow-up precautions if not making great improvements with colchicine treatment.  Follow-up as needed.

## 2021-06-21 ENCOUNTER — HOSPITAL ENCOUNTER (OUTPATIENT)
Dept: LAB | Facility: MEDICAL CENTER | Age: 76
End: 2021-06-21
Attending: INTERNAL MEDICINE
Payer: MEDICARE

## 2021-06-21 DIAGNOSIS — M10.9 GOUT INVOLVING TOE, UNSPECIFIED CAUSE, UNSPECIFIED CHRONICITY, UNSPECIFIED LATERALITY: ICD-10-CM

## 2021-06-21 LAB — URATE SERPL-MCNC: 6.5 MG/DL (ref 2.5–8.3)

## 2021-06-21 PROCEDURE — 36415 COLL VENOUS BLD VENIPUNCTURE: CPT

## 2021-06-21 PROCEDURE — 84550 ASSAY OF BLOOD/URIC ACID: CPT

## 2021-06-21 NOTE — PROGRESS NOTES
Gout involving toe, unspecified cause, unspecified chronicity, unspecified laterality  -     URIC ACID; Future

## 2021-06-24 ENCOUNTER — OFFICE VISIT (OUTPATIENT)
Dept: MEDICAL GROUP | Facility: MEDICAL CENTER | Age: 76
End: 2021-06-24
Payer: MEDICARE

## 2021-06-24 VITALS
WEIGHT: 196.21 LBS | RESPIRATION RATE: 16 BRPM | BODY MASS INDEX: 29.74 KG/M2 | TEMPERATURE: 97.3 F | DIASTOLIC BLOOD PRESSURE: 68 MMHG | SYSTOLIC BLOOD PRESSURE: 120 MMHG | HEIGHT: 68 IN | HEART RATE: 78 BPM | OXYGEN SATURATION: 95 %

## 2021-06-24 DIAGNOSIS — M10.9 ACUTE GOUT INVOLVING TOE OF LEFT FOOT, UNSPECIFIED CAUSE: ICD-10-CM

## 2021-06-24 PROCEDURE — 99212 OFFICE O/P EST SF 10 MIN: CPT | Performed by: INTERNAL MEDICINE

## 2021-06-24 ASSESSMENT — FIBROSIS 4 INDEX: FIB4 SCORE: 1.45

## 2021-06-24 NOTE — ASSESSMENT & PLAN NOTE
Left great toe gout attack few weeks ago that resolved with colchicine   The day before he had a lot of sushi  His uric acid 6.5  Healthful lifestyle measures discussed to prevent gout.

## 2021-06-24 NOTE — PROGRESS NOTES
Established Patient    Joshua Becerra is a 76 y.o. male who presents today with the following:    CC:   Chief Complaint   Patient presents with   • Other     follow up on gout   • Lab Results   • Other     Question on medication       HPI:     Acute gout involving toe of left foot  Left great toe gout attack few weeks ago that resolved with colchicine   The day before he had a lot of sushi  His uric acid 6.5  Healthful lifestyle measures discussed to prevent gout.            Current Outpatient Medications   Medication Sig Dispense Refill   • atorvastatin (LIPITOR) 20 MG Tab Take 1 tablet by mouth every day. 100 tablet 3   • levothyroxine (SYNTHROID) 125 MCG Tab Take 1 tablet by mouth Every morning on an empty stomach. 90 tablet 3   • losartan (COZAAR) 100 MG Tab Take 1 tablet by mouth every day. 100 tablet 3   • metoprolol tartrate (LOPRESSOR) 25 MG Tab Take 1 tablet by mouth 2 times a day. TAKE 1 TABLET BY MOUTH TWICE A  tablet 2   • clopidogrel (PLAVIX) 75 MG Tab TAKE 1 TABLET BY MOUTH EVERY  tablet 3   • Calcium 600 MG Tab Take 1 Tab by mouth every day.     • Multiple Vitamins-Minerals (MULTIVITAMIN ADULT PO) Take 1 Tab by mouth every day.       No current facility-administered medications for this visit.       Allergies, past medical history, past surgical history, medications, family history, social history reviewed and updated.    ROS   Constitutional: Denies fevers or chills  Eyes: Denies changes in vision  Ears/Nose/Throat/Mouth: Denies nasal congestion or sore throat   Cardiovascular: Denies chest pain or palpitations   Respiratory: Denies shortness of breath , Denies cough  Gastrointestinal/Hepatic: Denies abd pain, nausea, vomiting   Genitourinary: Denies dysuria or frequency  Musculoskeletal/Rheum: Denies joint pain and swelling   Neurological: Denies headache  Psychiatric: Denies mood disorder   Endocrine: Denies hx of diabetes or thyroid dysfunction  Heme/Oncology/Lymph Nodes:  "Denies weight changes or enlarged LNs.    Physical Exam  Vitals: /68 (BP Location: Left arm, Patient Position: Sitting, BP Cuff Size: Adult)   Pulse 78   Temp 36.3 °C (97.3 °F) (Temporal)   Resp 16   Ht 1.727 m (5' 8\")   Wt 89 kg (196 lb 3.4 oz)   SpO2 95%   BMI 29.83 kg/m²   General: Alert, pleasant, NAD  HEENT: Normocephalic.  EOMI, no icterus or pallor.  Conjunctivae and lids normal. External ears normal. Wearing a mask.  Neck supple.  No thyromegaly or masses palpated.   Cardiovascular: Regular rate and rhythm.    Respiratory: Normal respiratory effort.  Clear to auscultation bilaterally.  Abdomen: Non-distended, soft  Skin: Warm, dry, no rashes.  Musculoskeletal: Gait is normal.  Moves all extremities well.  Extremities: No leg edema. Slight erythema of left great toe  Psych:  Affect/mood is normal, judgement is good, memory is intact, grooming is appropriate.      Labs (6/21/21) were reviewed and discussed with patients.  All questions were answered.      Assessment and Plan    Acute gout involving toe of left foot, unspecified cause  Resolved with colchicine  Healthful lifestyle measures discussed    Starting a gout diet? Understand which foods are OK and which to avoid.    Gout is a painful form of arthritis that occurs when high levels of uric acid in the blood cause crystals to form and accumulate in and around a joint.  Uric acid is produced when the body breaks down a chemical called purine. Purine occurs naturally in your body, but it's also found in certain foods. Uric acid is eliminated from the body in urine.  A gout diet may help decrease uric acid levels in the blood. A gout diet isn't a cure. But it may lower the risk of recurring gout attacks and slow the progression of joint damage.  People with gout who follow a gout diet generally still need medication to manage pain and to lower levels of uric acid.  Gout diet goals  A gout diet is designed to help you:  • Achieve a healthy " weight and good eating habits  • Avoid some, but not all, foods with purines  • Include some foods that can control uric acid levels  A good rule of thumb is to eat moderate portions of healthy foods.  Diet details  The general principles of a gout diet follow typical healthy-diet recommendations:  • Weight loss. Being overweight increases the risk of developing gout, and losing weight lowers the risk of gout. Research suggests that reducing the number of calories and losing weight -- even without a purine-restricted diet -- lower uric acid levels and reduce the number of gout attacks. Losing weight also lessens the overall stress on joints.  • Complex carbs. Eat more fruits, vegetables and whole grains, which provide complex carbohydrates. Avoid foods and beverages with high-fructose corn syrup, and limit consumption of naturally sweet fruit juices.  • Water. Stay well-hydrated by drinking water.  • Fats. Cut back on saturated fats from red meat, fatty poultry and high-fat dairy products.  • Proteins. Focus on lean meat and poultry, low-fat dairy and lentils as sources of protein.  Recommendations for specific foods or supplements include:  • Organ and glandular meats. Avoid meats such as liver, kidney and sweetbreads, which have high purine levels and contribute to high blood levels of uric acid.  • Red meat. Limit serving sizes of beef, lamb and pork.  • Seafood. Some types of seafood -- such as anchovies, shellfish, sardines and tuna -- are higher in purines than are other types. But the overall health benefits of eating fish may outweigh the risks for people with gout. Moderate portions of fish can be part of a gout diet.  • High-purine vegetables. Studies have shown that vegetables high in purines, such as asparagus and spinach, don't increase the risk of gout or recurring gout attacks.  • Alcohol. Beer and distilled liquors are associated with an increased risk of gout and recurring attacks. Moderate  consumption of wine doesn't appear to increase the risk of gout attacks. Avoid alcohol during gout attacks, and limit alcohol, especially beer, between attacks.  • Sugary foods and beverages. Limit or avoid sugar-sweetened foods such as sweetened cereals, bakery goods and candies. Limit consumption of naturally sweet fruit juices.  • Vitamin C. Vitamin C may help lower uric acid levels. Talk to your doctor about whether a 500-milligram vitamin C supplement fits into your diet and medication plan.  • Coffee. Some research suggests that drinking coffee in moderation, especially regular caffeinated coffee, may be associated with a reduced risk of gout. Drinking coffee may not be appropriate if you have other medical conditions. Talk to your doctor about how much coffee is right for you.  • Cherries. There is some evidence that eating cherries is associated with a reduced risk of gout attacks.  Sample menu  Here's what you might eat during a typical day on a gout diet.  Breakfast  • Whole-grain, unsweetened cereal with skim or low-fat milk  • 1 cup fresh strawberries  • Coffee  • Water  Lunch  • Roasted chicken breast slices (2 ounces) on a whole-grain roll with mustard  • Mixed green salad with vegetables, 1 tablespoon nuts, and balsamic vinegar and olive oil dressing  • Skim or low-fat milk or water  Afternoon snack  • 1 cup fresh cherries  • Water  Dinner  • Roasted salmon (3 to 4 ounces)  • Roasted or steamed green beans  • 1/2 to 1 cup whole-grain pasta with olive oil and lemon pepper  • Water  • Low-fat yogurt  • 1 cup fresh melon  • Caffeine-free beverage, such as herbal tea  Results  Following a gout diet can help limit uric acid production and increase its elimination. A gout diet isn't likely to lower the uric acid concentration in your blood enough to treat your gout without medication. But it may help decrease the number of attacks and limit their severity.  Following a gout diet, along with limiting  calories and getting regular exercise, can also improve your overall health by helping you achieve and maintain a healthy weight.      Follow-up:Return if symptoms worsen or fail to improve.    This note was created using voice recognition software. There may be unintended errors in spelling, grammar or content.

## 2021-06-30 ENCOUNTER — PATIENT MESSAGE (OUTPATIENT)
Dept: HEALTH INFORMATION MANAGEMENT | Facility: OTHER | Age: 76
End: 2021-06-30

## 2021-07-08 DIAGNOSIS — M10.9 ACUTE GOUT INVOLVING TOE OF LEFT FOOT, UNSPECIFIED CAUSE: ICD-10-CM

## 2021-07-08 RX ORDER — COLCHICINE 0.6 MG/1
TABLET ORAL
Qty: 30 TABLET | Refills: 0 | Status: SHIPPED | OUTPATIENT
Start: 2021-07-08 | End: 2021-08-11

## 2021-07-08 NOTE — PROGRESS NOTES
-  Acute gout involving toe of left foot, unspecified cause  -     colchicine (COLCRYS) 0.6 MG Tab; Day 1: 1.2 mg once, followed in 1 hour with a single dose of 0.6 mg.  Day 2 and thereafter: Oral: 0.6 mg once daily until flare resolves

## 2021-07-22 ENCOUNTER — HOSPITAL ENCOUNTER (OUTPATIENT)
Dept: LAB | Facility: MEDICAL CENTER | Age: 76
End: 2021-07-22
Attending: INTERNAL MEDICINE
Payer: MEDICARE

## 2021-07-22 DIAGNOSIS — E78.2 MIXED HYPERLIPIDEMIA: ICD-10-CM

## 2021-07-22 LAB
ALBUMIN SERPL BCP-MCNC: 4.2 G/DL (ref 3.2–4.9)
ALBUMIN/GLOB SERPL: 1.5 G/DL
ALP SERPL-CCNC: 51 U/L (ref 30–99)
ALT SERPL-CCNC: 17 U/L (ref 2–50)
ANION GAP SERPL CALC-SCNC: 11 MMOL/L (ref 7–16)
AST SERPL-CCNC: 22 U/L (ref 12–45)
BILIRUB SERPL-MCNC: 0.5 MG/DL (ref 0.1–1.5)
BUN SERPL-MCNC: 16 MG/DL (ref 8–22)
CALCIUM SERPL-MCNC: 8.6 MG/DL (ref 8.5–10.5)
CHLORIDE SERPL-SCNC: 105 MMOL/L (ref 96–112)
CHOLEST SERPL-MCNC: 147 MG/DL (ref 100–199)
CO2 SERPL-SCNC: 26 MMOL/L (ref 20–33)
CREAT SERPL-MCNC: 1.02 MG/DL (ref 0.5–1.4)
FASTING STATUS PATIENT QL REPORTED: NORMAL
GLOBULIN SER CALC-MCNC: 2.8 G/DL (ref 1.9–3.5)
GLUCOSE SERPL-MCNC: 90 MG/DL (ref 65–99)
HDLC SERPL-MCNC: 57 MG/DL
LDLC SERPL CALC-MCNC: 66 MG/DL
POTASSIUM SERPL-SCNC: 4 MMOL/L (ref 3.6–5.5)
PROT SERPL-MCNC: 7 G/DL (ref 6–8.2)
SODIUM SERPL-SCNC: 142 MMOL/L (ref 135–145)
TRIGL SERPL-MCNC: 119 MG/DL (ref 0–149)

## 2021-07-22 PROCEDURE — 80053 COMPREHEN METABOLIC PANEL: CPT

## 2021-07-22 PROCEDURE — 80061 LIPID PANEL: CPT

## 2021-07-22 PROCEDURE — 36415 COLL VENOUS BLD VENIPUNCTURE: CPT

## 2021-07-23 ENCOUNTER — PATIENT MESSAGE (OUTPATIENT)
Dept: CARDIOLOGY | Facility: MEDICAL CENTER | Age: 76
End: 2021-07-23

## 2021-08-03 ENCOUNTER — OFFICE VISIT (OUTPATIENT)
Dept: CARDIOLOGY | Facility: MEDICAL CENTER | Age: 76
End: 2021-08-03
Payer: MEDICARE

## 2021-08-03 VITALS
DIASTOLIC BLOOD PRESSURE: 82 MMHG | SYSTOLIC BLOOD PRESSURE: 120 MMHG | RESPIRATION RATE: 12 BRPM | HEART RATE: 56 BPM | HEIGHT: 68 IN | BODY MASS INDEX: 29.95 KG/M2 | WEIGHT: 197.6 LBS | OXYGEN SATURATION: 94 %

## 2021-08-03 DIAGNOSIS — I35.0 NONRHEUMATIC AORTIC VALVE STENOSIS: ICD-10-CM

## 2021-08-03 DIAGNOSIS — I25.10 CORONARY ARTERY DISEASE INVOLVING NATIVE CORONARY ARTERY OF NATIVE HEART WITHOUT ANGINA PECTORIS: ICD-10-CM

## 2021-08-03 DIAGNOSIS — E78.2 MIXED HYPERLIPIDEMIA: ICD-10-CM

## 2021-08-03 DIAGNOSIS — I65.23 BILATERAL CAROTID ARTERY STENOSIS: ICD-10-CM

## 2021-08-03 DIAGNOSIS — Z95.1 S/P CABG (CORONARY ARTERY BYPASS GRAFT): ICD-10-CM

## 2021-08-03 DIAGNOSIS — I10 ESSENTIAL HYPERTENSION: ICD-10-CM

## 2021-08-03 PROCEDURE — 99214 OFFICE O/P EST MOD 30 MIN: CPT | Performed by: INTERNAL MEDICINE

## 2021-08-03 ASSESSMENT — ENCOUNTER SYMPTOMS
GASTROINTESTINAL NEGATIVE: 1
NERVOUS/ANXIOUS: 0
CARDIOVASCULAR NEGATIVE: 1
EYES NEGATIVE: 1
CHILLS: 0
FEVER: 0
PSYCHIATRIC NEGATIVE: 1
BLURRED VISION: 0
DIZZINESS: 0
MYALGIAS: 0
SHORTNESS OF BREATH: 0
BRUISES/BLEEDS EASILY: 0
DOUBLE VISION: 0
DEPRESSION: 0
NEUROLOGICAL NEGATIVE: 1
WEAKNESS: 0
NAUSEA: 0
CONSTITUTIONAL NEGATIVE: 1
CLAUDICATION: 0
HEADACHES: 0
WEIGHT LOSS: 0
ABDOMINAL PAIN: 0
VOMITING: 0
FOCAL WEAKNESS: 0
PALPITATIONS: 0
COUGH: 0
RESPIRATORY NEGATIVE: 1

## 2021-08-03 ASSESSMENT — FIBROSIS 4 INDEX: FIB4 SCORE: 1.82

## 2021-08-03 NOTE — PROGRESS NOTES
Chief Complaint   Patient presents with   • Coronary Artery Disease   • Aortic Stenosis     F/V Dx: Nonrheumatic aortic valve stenosis   • Hypertension     F/V Dx: Essential hypertension       Subjective:   Ed Ed Becerra is a 76 y.o. male who presents today dyslipidemia, medication change evaluation, Laboratory results reviewed.    Since the patient's last visit on 05/04/21, he has been doing well clinically. He denies chest pain, shortness of breath, palpitations, nausea/vomiting or diaphoresis. He is suffering with gout. On the last visit his atorvastatin dose was increased. He stays active walking or riding his bicycle.     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/18/2018   • High cholesterol    • Hyperlipidemia    • Hypertension    • Unspecified cataract    • Unspecified urinary incontinence 10/18/2018   • Valvular heart disease      Past Surgical History:   Procedure Laterality Date   • THYROIDECTOMY TOTAL  10/23/2018    Procedure: THYROIDECTOMY TOTAL;  Surgeon: Angel Adams M.D.;  Location: [facility];  Service: Ent   • NECK DISSECTION  10/23/2018    Procedure: NECK DISSECTION - POSSIBLE LIMITED CENTRAL;  Surgeon: Angel Adams M.D.;  Location: [facility];  Service: Ent   • CATARACT PHACO WITH IOL Left 7/2/2015    Procedure: CATARACT PHACO WITH IOL;  Surgeon: Matty Augustin M.D.;  Location: [facility];  Service:    • CATARACT PHACO WITH IOL Right 6/4/2015    Procedure: CATARACT PHACO WITH IOL;  Surgeon: Matty Augustin M.D.;  Location: [facility];  Service:    • MULTIPLE CORONARY ARTERY BYPASS ENDO VEIN HARVEST  12/15/2014    Performed by Gustavo Richardson M.D. at [facility]   • PROSTATECTOMY, RADICAL RETRO  12/17/2008     Family History   Problem Relation Age of Onset   • Cancer  Sister         colon   • Cancer Brother         prostate   • Heart Disease Mother    • Arthritis Mother    • Hypertension Mother    • Hyperlipidemia Mother    • Stroke Mother    • Heart Disease Father    • Hypertension Father    • Hyperlipidemia Father    • Stroke Father    • Arthritis Brother    • Hyperlipidemia Brother    • Diabetes Neg Hx      Social History     Socioeconomic History   • Marital status:      Spouse name: Not on file   • Number of children: Not on file   • Years of education: Not on file   • Highest education level: Bachelor's degree (e.g., BA, AB, BS)   Occupational History   • Not on file   Tobacco Use   • Smoking status: Never Smoker   • Smokeless tobacco: Never Used   • Tobacco comment: continued abstinence   Vaping Use   • Vaping Use: Never used   Substance and Sexual Activity   • Alcohol use: No     Alcohol/week: 0.0 oz   • Drug use: No   • Sexual activity: Not Currently     Partners: Female     Comment: s/p prostatectomy   Other Topics Concern   • Not on file   Social History Narrative    Clint was born and raised in the [facility]. He has been in [place] since 1989. He continues to work doing taxes, he is a CPA. He is  to Channahon for 50 years, they met in college. They have 3 kids- Larissa (49; [place]), Marina (46; [place]), Atif (39; [place]). They enjoy spending times with their grandkids, 4 in [place] and 2 in [place].      Social Determinants of Health     Financial Resource Strain: Low Risk    • Difficulty of Paying Living Expenses: Not very hard   Food Insecurity: No Food Insecurity   • Worried About Running Out of Food in the Last Year: Never true   • Ran Out of Food in the Last Year: Never true   Transportation Needs: No Transportation Needs   • Lack of Transportation (Medical): No   • Lack of Transportation (Non-Medical): No   Physical Activity: Insufficiently Active   • Days of Exercise per Week: 3 days   • Minutes of Exercise per Session: 10 min   Stress: No Stress Concern  Present   • Feeling of Stress : Only a little   Social Connections: Moderately Integrated   • Frequency of Communication with Friends and Family: More than three times a week   • Frequency of Social Gatherings with Friends and Family: Once a week   • Attends [employer] Services: More than 4 times per year   • Active Member of Clubs or Organizations: No   • Attends Club or Organization Meetings: Never   • Marital Status:    Intimate Partner Violence:    • Fear of Current or Ex-Partner:    • Emotionally Abused:    • Physically Abused:    • Sexually Abused:      No Known Allergies   (Medications reviewed.)  Outpatient Encounter Medications as of 8/3/2021   Medication Sig Dispense Refill   • colchicine (COLCRYS) 0.6 MG Tab Day 1: 1.2 mg once, followed in 1 hour with a single dose of 0.6 mg.  Day 2 and thereafter: Oral: 0.6 mg once daily until flare resolves 30 tablet 0   • atorvastatin (LIPITOR) 20 MG Tab Take 1 tablet by mouth every day. 100 tablet 3   • levothyroxine (SYNTHROID) 125 MCG Tab Take 1 tablet by mouth Every morning on an empty stomach. 90 tablet 3   • losartan (COZAAR) 100 MG Tab Take 1 tablet by mouth every day. 100 tablet 3   • metoprolol tartrate (LOPRESSOR) 25 MG Tab Take 1 tablet by mouth 2 times a day. TAKE 1 TABLET BY MOUTH TWICE A  tablet 2   • clopidogrel (PLAVIX) 75 MG Tab TAKE 1 TABLET BY MOUTH EVERY  tablet 3   • Multiple Vitamins-Minerals (MULTIVITAMIN ADULT PO) Take 1 Tab by mouth every day.     • Calcium 600 MG Tab Take 1 Tab by mouth every day.       No facility-administered encounter medications on file as of 8/3/2021.     Review of Systems   Constitutional: Negative.  Negative for chills, fever, malaise/fatigue and weight loss.   HENT: Negative.  Negative for hearing loss.    Eyes: Negative.  Negative for blurred vision and double vision.   Respiratory: Negative.  Negative for cough and shortness of breath.    Cardiovascular: Negative.  Negative for chest pain,  "palpitations, claudication and leg swelling.   Gastrointestinal: Negative.  Negative for abdominal pain, nausea and vomiting.   Genitourinary: Negative.  Negative for dysuria and urgency.   Musculoskeletal: Positive for joint pain. Negative for myalgias.   Skin: Negative.  Negative for itching and rash.   Neurological: Negative.  Negative for dizziness, focal weakness, weakness and headaches.   Endo/Heme/Allergies: Negative.  Does not bruise/bleed easily.   Psychiatric/Behavioral: Negative.  Negative for depression. The patient is not nervous/anxious.         Objective:   /82 (BP Location: Left arm, Patient Position: Sitting, BP Cuff Size: Adult)   Pulse (!) 56   Resp 12   Ht 1.727 m (5' 8\")   Wt 89.6 kg (197 lb 9.6 oz)   SpO2 94%   BMI 30.04 kg/m²     Physical Exam   Constitutional: He is oriented to person, place, and time. He appears well-developed.   HENT:   Head: Normocephalic and atraumatic.   Neck: No JVD present.   Cardiovascular: Normal rate and regular rhythm.   Murmur heard.  Pulmonary/Chest: Effort normal and breath sounds normal.   Abdominal: Soft. Bowel sounds are normal.   No hepatosplenomegaly.   Musculoskeletal:         General: Normal range of motion.   Lymphadenopathy:     He has no cervical adenopathy.   Neurological: He is alert and oriented to person, place, and time.   Skin: Skin is warm and dry.     CARDIAC STUDIES/PROCEDURES:     CARDIAC CATHETERIZATION CONCLUSIONS (12/13/14)  1. Two-vessel coronary artery disease with mid left anterior descending   artery, ostial diagonal branch and proximal circumflex artery stenosis.   2. Normal left ventricular systolic function with ejection fraction of 55%.   3. Mildly elevated left ventricular end diastolic pressure.   4. Mild aortic stenosis.     CAROTID ULTRASOUND (04/20/21)  1.  There is moderate amount of atherosclerotic plaque.  Plaque is located in carotid bulbs and proximal internal carotid arteries.  Plaque characterization: "  Heterogeneous, partially calcified  2.  There is no evidence of carotid occlusion   3. Vertebral arteries demonstrate antegrade flow.  4. Diameter reduction in the internal carotid arteries: less than 50% where visualized. However, there is markedly decreased flow in the right ICA, with parvus-tardus waveform, suggestive of high-grade upstream ICA stenosis, most likely in the region of   right common carotid bifurcation. Consider further evaluation with neck CTA.     CAROTID ULTRASOUND (05/17/18)  Probable distal right internal carotid occlusion, chronic. No changes from 2017.  Mild stenosis of the left internal carotid (< 50%).   Flow within both subclavian arteries appears to be within normal limits.   Antegrade flow, bilateral vertebral arteries.   Incidental left-sided thyroid cyst noted.  Follow as indicated clinically.     CAROTID ULTRASOUND (12/13/14)  RIGHT:  Very mild smooth plaque of the common carotids & bifurcation with minimal   plaque in the proximal internal carotid artery. The ICA waveforms are   resistive with low peak systolic & end diastolic velocities which may   suggest distal obstruction or occlusion.  LEFT:  Very mild smooth plaque of the common carotids & bifurcation extending into   the internal carotid. Velocities are consistent with < 50% stenosis of the   internal carotid artery.   Bilateral subclavian and vertebral artery waveforms are antegrade and   waveforms are normal in character and velocity.      CT OF HEAD (12/18/14)  1. Occlusion or near occlusion of the intracranial right internal carotid artery with apparent tiny branch extending to the middle cerebral artery. The right middle cerebral artery appears to be supplied primarily by the anterior communicating artery.     CTA OF NECK (04/29/21)  1.  CTA neck stable since 12/14/2014  2.  Long segment approximately 80% stenosis of the right internal carotid artery extending from approximately 2 cm distal to the carotid bifurcation to  the skull base. The supraclinoid portion has either occlusion with reconstitution or a very high-grade stenosis with reconstitution  3.  Findings are consistent with either a chronic dissection or atherosclerotic narrowing  4.  No other significant finding     CT OF NECK (12/18/14)  1. Diffuse narrowing of the right internal carotid artery beginning approximately 2 cm distal to its origin could be due to diffuse atherosclerotic disease or chronic dissection.  2. Occlusion or near occlusion of the intracranial portion of the right internal carotid artery.  3. No evidence of left internal carotid artery stenosis.     ECHOCARDIOGRAM CONCLUSIONS (04/23/21)  Prior echo 05/17/18. Compared to the report of the study done - there   has been no significant change.   Normal left ventricular systolic function.  Left ventricular ejection fraction is visually estimated to be 65%.  Mild mitral regurgitation.  Mild aortic stenosis.  Vmax is 2.3 m/s. Transvalvular gradients are - Peak: 22 mmHg, Mean: 14 mmHg.   Mild tricuspid regurgitation.  Estimated right ventricular systolic pressure is 35 mmHg.     ECHOCARDIOGRAM CONCLUSIONS (05/17/18)  Prior echo 5-18-17. Compared to the report of the study done - there has been no significant change.   Normal left ventricular systolic function.  Left ventricular ejection fraction is visually estimated to be 60%.  Grade II diastolic dysfunction.  Mild mitral regurgitation.  Aortic sclerosis with mild stenosis.  Vmax is 2.01 m/s. Transvalvular gradients are - Peak: 16 mmHg,  Mean: 9 mmHg.  Mild tricuspid regurgitation.  Estimated right ventricular systolic pressure is 45 mmHg.     ECHOCARDIOGRAM CONCLUSIONS (05/17/18)  Prior echo 5-18-17. Compared to the report of the study done - there has been no significant change.   Normal left ventricular systolic function.  Left ventricular ejection fraction is visually estimated to be 60%.  Grade II diastolic dysfunction.  Mild mitral  regurgitation.  Aortic sclerosis with mild stenosis.  Vmax is 2.01 m/s. Transvalvular gradients are - Peak: 16 mmHg, Mean: 9 mmHg.  Mild tricuspid regurgitation.  Estimated right ventricular systolic pressure is 45 mmHg.     ECHOCARDIOGRAM CONCLUSIONS (05/18/17)  Compared to the prior echo dated 7/9/15, the aortic valve still appears mildly stenotic.  1. Left ventricular ejection fraction is visually estimated to be 60%.   Grade II diastolic dysfunction.  2. There is mild aortic stenosis.  AV Peak Velocity 1.9 m/s                 AV Peak Gradient 14.6 mmHg               AV Mean Gradient 8 mmHg   3. Estimated right ventricular systolic pressure  is 30 mmHg.     ECHOCARDIOGRAM CONCLUSIONS (07/09/15)  Normal left ventricular systolic function.  Moderate concentric left ventricular hypertrophy.  Normal diastolic function - normal mitral inflow pattern and E/E' is normal.  Mitral annular calcification.  Mild mitral regurgitation.  Mild aortic stenosis.  Mild tricuspid regurgitation.     ECHOCARDIOGRAM CONCLUSIONS (12/12/14)  Technically good study.  Normal left ventricular systolic function.  Left ventricular ejection fraction is 60% to 65%.  Grade I diastolic dysfunction - mitral inflow E/A is <1.0.  Mild mitral regurgitation.  Mild aortic stenosis.  Mild tricuspid regurgitation.     EKG performed on (12/18/14) EKG shows atrial fibrillation. (Post CABG)  EKG performed on (12/13/14) EKG shows normal sinus rhythm.     Laboratory results of (07/22/21) were reviewed. Cholesterol profile of 147/119/57/66 mg/dL noted.  Laboratory results of (02/18/21) Cholesterol profile of 158/96/46/93 mg/dL noted.  Laboratory results of (12/20/19) Cholesterol profile of 141/118/44/73 mg/dl noted.  Laboratory results of (07/20/19) Cholesterol profile of 145/78/53/76 noted.  Laboratory results of (09/11/18) Cholesterol profile of 152/82/48/88 noted.  Laboratory results of (03/13/18) Cholesterol profile of 141/78/51/74 noted.  Laboratory  results of (03/08/17) Cholesterol profile of 158/97/58/81 noted.  Laboratory results of (06/03/16) Cholesterol profile of 106/102/45/41 noted.  Laboratory results of (12/13/14) Cholesterol profile of 139/98/46/73 noted.     MYOCARDIAL PERFUSION STUDY CONCLUSIONS (09/17/19)  No evidence of significant jeopardized viable myocardium or prior myocardial infarction.  Normal left ventricular size, ejection fraction, and wall motion.  Nondiagnostic stress test.   ECG INTERPRETATION  Negative stress ECG for ischemia.    Assessment:     1. Mixed hyperlipidemia     2. Coronary artery disease involving native coronary artery of native heart without angina pectoris     3. S/P CABG (coronary artery bypass graft)     4. Nonrheumatic aortic valve stenosis     5. Essential hypertension     6. Bilateral carotid artery stenosis         Medical Decision Making:  Today's Assessment / Status / Plan:     1. Hyperlipidemia: He is doing well on statin therapy without myalgia symptoms. His LDL level is at target  2. Coronary artery disease with prior coronary bypass graft (x 3 with left internal mammary artery graft to left anterior descending artery, saphenous vein graft to diagonal branch, saphenous vein graft to distal obtuse marginal branch by Dr. Richardson 12/15/14): He is clinically doing well. I will continue with current medical care including clopidogrel, metoprolol, losartan and atorvastatin.  2. Aortic stenosis: He remains asymptomatic with mild aortic stenosis. We will follow him clinically and repeat an echocardiogram in one year.  3. Hypertension: Blood pressure is well controlled. We will continue with beta blockade therapy and angiotensin receptor blockade  4. 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 No intervention is warranted per Andrea Askew on 12/14/14; Per Vito Barone 08/08/117: Based on the patient's asymptomatic status and the fact that the  carotid artery occlusion is in the distal internal carotid artery which is surgically inaccessible I have recommended continued conservative management. The fact that he has no symptoms of TIA or stroke would led itself to a more conservative approach. I don't think that the would benefit from any type of neuro-interventional consultation based on the fact that the patient is asymptomatic.): Clinically stable on above medical therapy.    We will follow up the patient in one year with echocardiogram.    CC Kulwant Almaraz

## 2021-08-11 DIAGNOSIS — M10.9 ACUTE GOUT INVOLVING TOE OF LEFT FOOT, UNSPECIFIED CAUSE: ICD-10-CM

## 2021-08-11 RX ORDER — COLCHICINE 0.6 MG/1
TABLET ORAL
Qty: 30 TABLET | Refills: 0 | Status: SHIPPED | OUTPATIENT
Start: 2021-08-11 | End: 2021-08-26

## 2021-08-17 ENCOUNTER — TELEPHONE (OUTPATIENT)
Dept: MEDICAL GROUP | Facility: MEDICAL CENTER | Age: 76
End: 2021-08-17

## 2021-08-17 NOTE — TELEPHONE ENCOUNTER
ANNUAL WELLNESS VISIT PRE-VISIT PLANNING    Patient was NOT contacted to complete the Annual Wellness Visit Pre-Visit Planning. Information was reviewed in the chart.     1.  Reviewed notes from the last office visit: Yes    2.  If any orders were ordered or intended to be done prior to visit (i.e. 6 mos follow-up), do we have results/consult notes or has patient scheduled?   · Labs - Labs were not ordered at last office visit. Recent labs are in chart.   Note: If patient appointment is for lab review and patient did not complete labs, check with provider if OK to reschedule patient until labs completed.  · Imaging - Imaging was not ordered at last office visit.  · Referrals - Referral ordered, patient has NOT been seen. Referral to GI and Endocrinology has been AUTHORIZED.     3.  Immunizations were updated in Epic using Reconcile Outside Information activity? Yes. Immunizations queried through [employer] and reconciled from outside sources. Immunization records are up to date.  · Is patient due for Tdap? NO  · Is patient due for Shingrix? NO    4.  Patient is due for the following Health Maintenance Topics:   Health Maintenance Due   Topic Date Due   • Annual Wellness Visit  Never done   • COLORECTAL CANCER SCREENING  08/02/2010     5.  Reviewed/Updated the following:  · Preferred Pharmacy? Yes  · Preferred Lab? Yes  · Preferred Communication? Yes  · Allergies? Yes. No new allergies to reconcile from outside sources.  · Medications? YES. Was Abstract Encounter opened and chart updated? NO. No new medications to reconcile from outside sources.   · Social History? Yes  · Family History (document living status of immediate family members and if + hx of cancer, diabetes, hypertension, hyperlipidemia, heart attack, stroke) No    6.  Care Team Updated:  · DME Company (gait device, O2, CPAP, etc.): N\A  · Other Specialists (eye doctor, derm, GYN, cardiology, endo, etc): YES    7.  Patient was not advised: “This is a free  wellness visit. The provider will screen for medical conditions to help you stay healthy. If you have other concerns to address you may be asked to discuss these at a separate visit or there may be an additional fee.”     8.  AHA ([employer]) form printed for Provider? No, patient does not have any open alerts       ---------------------------------------------------------------------------------------------  This Pre-Visit Planning note has been created for the Provider and Medical Assistant to review prior to the patient's office appointment.   Patient is NOT REQUIRED to follow-up on this note.

## 2021-08-23 ENCOUNTER — OFFICE VISIT (OUTPATIENT)
Dept: MEDICAL GROUP | Facility: MEDICAL CENTER | Age: 76
End: 2021-08-23
Payer: MEDICARE

## 2021-08-23 VITALS
BODY MASS INDEX: 30.51 KG/M2 | TEMPERATURE: 97.9 F | OXYGEN SATURATION: 96 % | DIASTOLIC BLOOD PRESSURE: 64 MMHG | WEIGHT: 201.28 LBS | RESPIRATION RATE: 18 BRPM | SYSTOLIC BLOOD PRESSURE: 112 MMHG | HEART RATE: 58 BPM | HEIGHT: 68 IN

## 2021-08-23 DIAGNOSIS — E78.2 MIXED HYPERLIPIDEMIA: ICD-10-CM

## 2021-08-23 DIAGNOSIS — I25.10 CORONARY ARTERY DISEASE INVOLVING NATIVE CORONARY ARTERY OF NATIVE HEART WITHOUT ANGINA PECTORIS: ICD-10-CM

## 2021-08-23 DIAGNOSIS — I35.0 NONRHEUMATIC AORTIC VALVE STENOSIS: ICD-10-CM

## 2021-08-23 DIAGNOSIS — D75.89 MACROCYTOSIS WITHOUT ANEMIA: ICD-10-CM

## 2021-08-23 DIAGNOSIS — E89.0 POSTOPERATIVE HYPOTHYROIDISM: ICD-10-CM

## 2021-08-23 DIAGNOSIS — I10 ESSENTIAL HYPERTENSION: ICD-10-CM

## 2021-08-23 PROCEDURE — 99214 OFFICE O/P EST MOD 30 MIN: CPT | Performed by: INTERNAL MEDICINE

## 2021-08-23 SDOH — ECONOMIC STABILITY: FOOD INSECURITY: WITHIN THE PAST 12 MONTHS, YOU WORRIED THAT YOUR FOOD WOULD RUN OUT BEFORE YOU GOT MONEY TO BUY MORE.: NEVER TRUE

## 2021-08-23 SDOH — ECONOMIC STABILITY: FOOD INSECURITY: WITHIN THE PAST 12 MONTHS, THE FOOD YOU BOUGHT JUST DIDN'T LAST AND YOU DIDN'T HAVE MONEY TO GET MORE.: NEVER TRUE

## 2021-08-23 SDOH — HEALTH STABILITY: PHYSICAL HEALTH: ON AVERAGE, HOW MANY DAYS PER WEEK DO YOU ENGAGE IN MODERATE TO STRENUOUS EXERCISE (LIKE A BRISK WALK)?: 2 DAYS

## 2021-08-23 SDOH — ECONOMIC STABILITY: TRANSPORTATION INSECURITY
IN THE PAST 12 MONTHS, HAS LACK OF TRANSPORTATION KEPT YOU FROM MEETINGS, WORK, OR FROM GETTING THINGS NEEDED FOR DAILY LIVING?: NO

## 2021-08-23 SDOH — HEALTH STABILITY: PHYSICAL HEALTH: ON AVERAGE, HOW MANY MINUTES DO YOU ENGAGE IN EXERCISE AT THIS LEVEL?: 10 MIN

## 2021-08-23 SDOH — ECONOMIC STABILITY: INCOME INSECURITY: HOW HARD IS IT FOR YOU TO PAY FOR THE VERY BASICS LIKE FOOD, HOUSING, MEDICAL CARE, AND HEATING?: NOT HARD AT ALL

## 2021-08-23 SDOH — ECONOMIC STABILITY: INCOME INSECURITY: IN THE LAST 12 MONTHS, WAS THERE A TIME WHEN YOU WERE NOT ABLE TO PAY THE MORTGAGE OR RENT ON TIME?: NO

## 2021-08-23 SDOH — ECONOMIC STABILITY: HOUSING INSECURITY: IN THE LAST 12 MONTHS, HOW MANY PLACES HAVE YOU LIVED?: 1

## 2021-08-23 ASSESSMENT — SOCIAL DETERMINANTS OF HEALTH (SDOH)
IN A TYPICAL WEEK, HOW MANY TIMES DO YOU TALK ON THE PHONE WITH FAMILY, FRIENDS, OR NEIGHBORS?: ONCE A WEEK
HOW OFTEN DO YOU GET TOGETHER WITH FRIENDS OR RELATIVES?: ONCE A WEEK
HOW OFTEN DO YOU ATTENT MEETINGS OF THE CLUB OR ORGANIZATION YOU BELONG TO?: NEVER
HOW OFTEN DO YOU ATTENT MEETINGS OF THE CLUB OR ORGANIZATION YOU BELONG TO?: NEVER
WITHIN THE PAST 12 MONTHS, YOU WORRIED THAT YOUR FOOD WOULD RUN OUT BEFORE YOU GOT THE MONEY TO BUY MORE: NEVER TRUE
HOW OFTEN DO YOU ATTEND CHURCH OR RELIGIOUS SERVICES?: MORE THAN 4 TIMES PER YEAR
HOW HARD IS IT FOR YOU TO PAY FOR THE VERY BASICS LIKE FOOD, HOUSING, MEDICAL CARE, AND HEATING?: NOT HARD AT ALL
IN A TYPICAL WEEK, HOW MANY TIMES DO YOU TALK ON THE PHONE WITH FAMILY, FRIENDS, OR NEIGHBORS?: ONCE A WEEK
HOW OFTEN DO YOU GET TOGETHER WITH FRIENDS OR RELATIVES?: ONCE A WEEK
HOW OFTEN DO YOU HAVE SIX OR MORE DRINKS ON ONE OCCASION: NEVER
DO YOU BELONG TO ANY CLUBS OR ORGANIZATIONS SUCH AS CHURCH GROUPS UNIONS, FRATERNAL OR ATHLETIC GROUPS, OR SCHOOL GROUPS?: NO
HOW OFTEN DO YOU ATTEND CHURCH OR RELIGIOUS SERVICES?: MORE THAN 4 TIMES PER YEAR
DO YOU BELONG TO ANY CLUBS OR ORGANIZATIONS SUCH AS CHURCH GROUPS UNIONS, FRATERNAL OR ATHLETIC GROUPS, OR SCHOOL GROUPS?: NO
HOW OFTEN DO YOU HAVE A DRINK CONTAINING ALCOHOL: NEVER

## 2021-08-23 ASSESSMENT — LIFESTYLE VARIABLES
HOW OFTEN DO YOU HAVE SIX OR MORE DRINKS ON ONE OCCASION: NEVER
HOW OFTEN DO YOU HAVE A DRINK CONTAINING ALCOHOL: NEVER

## 2021-08-23 ASSESSMENT — FIBROSIS 4 INDEX: FIB4 SCORE: 1.82

## 2021-08-23 NOTE — ASSESSMENT & PLAN NOTE
Follows with cardiologist, Dr Garcia  4/23/21 TTE  Prior echo 05/17/18. Compared to the report of the study done - there   has been no significant change.   Normal left ventricular systolic function.  Left ventricular ejection fraction is visually estimated to be 65%.  Mild mitral regurgitation.  Mild aortic stenosis.   Vmax is  2.3 m/s. Transvalvular gradients are - Peak: 22 mmHg, Mean:   14 mmHg.   Mild tricuspid regurgitation.  Estimated right ventricular systolic pressure is 35 mmHg.    5/2018 TTE  Prior echo 5-18-17. Compared to the report of the study done - there   has been no significant change.   Normal left ventricular systolic function.  Left ventricular ejection fraction is visually estimated to be 60%.  Grade II diastolic dysfunction.  Mild mitral regurgitation.  Aortic sclerosis with mild stenosis.  Vmax is 2.01 m/s. Transvalvular gradients are - Peak: 16 mmHg,  Mean: 9   mmHg.  Mild tricuspid regurgitation.  Estimated right ventricular systolic pressure is 45 mmHg.

## 2021-08-23 NOTE — ASSESSMENT & PLAN NOTE
Status post cardiac triple bypass surgery with left internal mammary artery graft to left anterior descending artery, saphenous vein graft to diagonal branch, saphenous vein graft to distal obtuse marginal branch by Dr. Richardson (12/15/14).  Plavix, atorvastatin, losartan, metoprolol  Denies chest pain, SOB, palpitation, leg edema    Follow with cardiology

## 2021-08-23 NOTE — PROGRESS NOTES
Established Patient    Joshua Becerra is a 76 y.o. male who presents today with the following:    CC:   Chief Complaint   Patient presents with   • Follow-Up     6 month       HPI:     Macrocytosis without anemia  Macrocytosis  TSH, B12, folate okay.   Does not drink ETOH   Ref. Range 2/18/2021 08:07   WBC Latest Ref Range: 4.8 - 10.8 K/uL 4.7 (L)   RBC Latest Ref Range: 4.70 - 6.10 M/uL 4.57 (L)   Hemoglobin Latest Ref Range: 14.0 - 18.0 g/dL 15.7   Hematocrit Latest Ref Range: 42.0 - 52.0 % 46.2   MCV Latest Ref Range: 81.4 - 97.8 fL 101.1 (H)   MCH Latest Ref Range: 27.0 - 33.0 pg 34.4 (H)   MCHC Latest Ref Range: 33.7 - 35.3 g/dL 34.0   RDW Latest Ref Range: 35.9 - 50.0 fL 44.2   Platelet Count Latest Ref Range: 164 - 446 K/uL 217   MPV Latest Ref Range: 9.0 - 12.9 fL 11.2         Mixed hyperlipidemia  Stable on lipitor    Nonrheumatic aortic valve stenosis  Follows with cardiologist, Dr Garcia  4/23/21 TTE  Prior echo 05/17/18. Compared to the report of the study done - there   has been no significant change.   Normal left ventricular systolic function.  Left ventricular ejection fraction is visually estimated to be 65%.  Mild mitral regurgitation.  Mild aortic stenosis.   Vmax is  2.3 m/s. Transvalvular gradients are - Peak: 22 mmHg, Mean:   14 mmHg.   Mild tricuspid regurgitation.  Estimated right ventricular systolic pressure is 35 mmHg.    5/2018 TTE  Prior echo 5-18-17. Compared to the report of the study done - there   has been no significant change.   Normal left ventricular systolic function.  Left ventricular ejection fraction is visually estimated to be 60%.  Grade II diastolic dysfunction.  Mild mitral regurgitation.  Aortic sclerosis with mild stenosis.  Vmax is 2.01 m/s. Transvalvular gradients are - Peak: 16 mmHg,  Mean: 9   mmHg.  Mild tricuspid regurgitation.  Estimated right ventricular systolic pressure is 45 mmHg.      Postoperative hypothyroidism  On levothyroxine 125 mcg daily    Ref. Range 2/18/2021 08:07   TSH Latest Ref Range: 0.380 - 5.330 uIU/mL 0.480   T3 Latest Ref Range: 60.0 - 181.0 ng/dL 88.4   Thyroglobulin by LC-MC/MS-S/P Latest Ref Range: 1.3 - 31.8 ng/mL Not Applicable   Thyroglobulin Latest Ref Range: 1.3 - 31.8 ng/mL 0.2 (L)       CAD (coronary artery disease)  Status post cardiac triple bypass surgery with left internal mammary artery graft to left anterior descending artery, saphenous vein graft to diagonal branch, saphenous vein graft to distal obtuse marginal branch by Dr. Richardson (12/15/14).  Plavix, atorvastatin, losartan, metoprolol  Denies chest pain, SOB, palpitation, leg edema    Follow with cardiology        Current Outpatient Medications   Medication Sig Dispense Refill   • colchicine (COLCRYS) 0.6 MG Tab DAY 1: 1.2 MG ONCE, FOLLOWED IN 1 HOUR WITH A SINGLE DOSE OF 0.6 MG. DAY 2 AND THEREAFTER: ORAL: 0.6 MG ONCE DAILY UNTIL FLARE RESOLVES 30 Tablet 0   • atorvastatin (LIPITOR) 20 MG Tab Take 1 tablet by mouth every day. 100 tablet 3   • levothyroxine (SYNTHROID) 125 MCG Tab Take 1 tablet by mouth Every morning on an empty stomach. 90 tablet 3   • losartan (COZAAR) 100 MG Tab Take 1 tablet by mouth every day. 100 tablet 3   • metoprolol tartrate (LOPRESSOR) 25 MG Tab Take 1 tablet by mouth 2 times a day. TAKE 1 TABLET BY MOUTH TWICE A  tablet 2   • clopidogrel (PLAVIX) 75 MG Tab TAKE 1 TABLET BY MOUTH EVERY  tablet 3   • Multiple Vitamins-Minerals (MULTIVITAMIN ADULT PO) Take 1 Tab by mouth every day.     • Calcium 600 MG Tab Take 1 Tab by mouth every day.       No current facility-administered medications for this visit.       Allergies, past medical history, past surgical history, medications, family history, social history reviewed and updated.    ROS   Constitutional: Denies fevers or chills  Eyes: Denies changes in vision  Ears/Nose/Throat/Mouth: Denies nasal congestion or sore throat   Cardiovascular: Denies chest pain or palpitations  "  Respiratory: Denies shortness of breath , Denies cough  Gastrointestinal/Hepatic: Denies abd pain, nausea, vomiting   Genitourinary: Denies dysuria or frequency  Musculoskeletal/Rheum: Denies joint pain and swelling   Neurological: Denies headache  Psychiatric: Denies mood disorder   Endocrine: Denies hx of diabetes or thyroid dysfunction  Heme/Oncology/Lymph Nodes: Denies weight changes or enlarged LNs.    Physical Exam  Vitals: /64 (BP Location: Right arm, Patient Position: Sitting, BP Cuff Size: Adult)   Pulse (!) 58   Temp 36.6 °C (97.9 °F) (Temporal)   Resp 18   Ht 1.727 m (5' 8\")   Wt 91.3 kg (201 lb 4.5 oz)   SpO2 96%   BMI 30.60 kg/m²   General: Alert, pleasant, NAD  HEENT: Normocephalic.  EOMI, no icterus or pallor.  Conjunctivae and lids normal. External ears normal. Wearing a mask. Oropharynx non-erythematous, mucous membranes moist.  Neck supple.  No thyromegaly or masses palpated.   Lymph: No cervical or supraclavicular lymphadenopathy.  Cardiovascular: Regular rate and rhythm. Murmur  Respiratory: Normal respiratory effort.  Clear to auscultation bilaterally.  Abdomen: Non-distended, soft, non-tender  Skin: Warm, dry, no rashes.  Musculoskeletal: Gait is normal.  Moves all extremities well.  Extremities: No leg edema.  Radial pulses 2+ symmetric.   Psych:  Affect/mood is normal, judgement is good, memory is intact, grooming is appropriate.      Assessment and Plan    1. Macrocytosis without anemia  - REFERRAL TO HEMATOLOGY ONCOLOGY    2. Postoperative hypothyroidism  - THYROGLOBULIN, QT; Future  - TSH WITH REFLEX TO FT4; Future  - ANTITHYROGLOBULIN AB; Future  On levothyroxine   Ref. Range 2/18/2021 08:07   TSH Latest Ref Range: 0.380 - 5.330 uIU/mL 0.480   T3 Latest Ref Range: 60.0 - 181.0 ng/dL 88.4   Thyroglobulin by LC-MC/MS-S/P Latest Ref Range: 1.3 - 31.8 ng/mL Not Applicable   Thyroglobulin Latest Ref Range: 1.3 - 31.8 ng/mL 0.2 (L)     3. Mixed hyperlipidemia  - Lipid Profile; " "Future  Atorvastatin    4. Essential hypertension  - CBC WITH DIFFERENTIAL; Future  - Comp Metabolic Panel; Future  Losartan, metoprolol    5. Nonrheumatic aortic valve stenosis  Follow with cardiology    6. oronary artery disease involving native coronary artery of native heart without angina pectoris  Status post cardiac triple bypass surgery with left internal mammary artery graft to left anterior descending artery, saphenous vein graft to diagonal branch, saphenous vein graft to distal obtuse marginal branch by Dr. Richardson (12/15/14).  Plavix, atorvastatin, losartan, metoprolol  Denies chest pain, SOB, palpitation, leg edema    Follow with cardiology      Patient Counseling:  --Discussed Healthful lifestyle measures  --Discussed brushing, flossing, and dental visits.   --Encouraged regular exercise.   --Reviewed sun protective behavior including sunscreen application and reapplication, appropriate choice of SPF, hat, protective clothing, seeking shade and never choosing to \"lie out\" in the sun.  --Injury prevention discussed    Follow-up:Return in about 6 months (around 2/23/2022), or if symptoms worsen or fail to improve.    This note was created using voice recognition software. There may be unintended errors in spelling, grammar or content.    "

## 2021-08-23 NOTE — ASSESSMENT & PLAN NOTE
On levothyroxine 125 mcg daily   Ref. Range 2/18/2021 08:07   TSH Latest Ref Range: 0.380 - 5.330 uIU/mL 0.480   T3 Latest Ref Range: 60.0 - 181.0 ng/dL 88.4   Thyroglobulin by LC-MC/MS-S/P Latest Ref Range: 1.3 - 31.8 ng/mL Not Applicable   Thyroglobulin Latest Ref Range: 1.3 - 31.8 ng/mL 0.2 (L)

## 2021-09-27 ENCOUNTER — PATIENT MESSAGE (OUTPATIENT)
Dept: HEALTH INFORMATION MANAGEMENT | Facility: OTHER | Age: 76
End: 2021-09-27

## 2021-11-17 ENCOUNTER — TELEPHONE (OUTPATIENT)
Dept: HEALTH INFORMATION MANAGEMENT | Facility: OTHER | Age: 76
End: 2021-11-17

## 2021-11-18 NOTE — TELEPHONE ENCOUNTER
Called patient to schedule   COMPREHENSIVE HEALTH  ASSESSMENT.   Left voicemail message requesting a call back.

## 2021-11-18 NOTE — TELEPHONE ENCOUNTER
Called patient to schedule   COMPREHENSIVE HEALTH  ASSESSMENT.   Left message with a female who stated PT was unavailable, not able to leave a call back number.

## 2021-11-23 ENCOUNTER — APPOINTMENT (OUTPATIENT)
Dept: MEDICAL GROUP | Facility: MEDICAL CENTER | Age: 76
End: 2021-11-23
Payer: MEDICARE

## 2022-01-24 DIAGNOSIS — I65.23 BILATERAL CAROTID ARTERY STENOSIS: ICD-10-CM

## 2022-01-26 DIAGNOSIS — I65.23 BILATERAL CAROTID ARTERY STENOSIS: ICD-10-CM

## 2022-01-26 RX ORDER — CLOPIDOGREL BISULFATE 75 MG/1
75 TABLET ORAL
Qty: 100 TABLET | Refills: 3 | Status: SHIPPED | OUTPATIENT
Start: 2022-01-26 | End: 2022-01-26 | Stop reason: SDUPTHER

## 2022-01-27 RX ORDER — CLOPIDOGREL BISULFATE 75 MG/1
75 TABLET ORAL
Qty: 100 TABLET | Refills: 3 | Status: SHIPPED | OUTPATIENT
Start: 2022-01-27 | End: 2023-03-01 | Stop reason: SDUPTHER

## 2022-01-30 DIAGNOSIS — E89.0 POSTOPERATIVE HYPOTHYROIDISM: ICD-10-CM

## 2022-01-31 RX ORDER — LEVOTHYROXINE SODIUM 0.12 MG/1
TABLET ORAL
Qty: 90 TABLET | Refills: 3 | Status: SHIPPED | OUTPATIENT
Start: 2022-01-31 | End: 2023-05-04 | Stop reason: SDUPTHER

## 2022-03-28 ENCOUNTER — APPOINTMENT (OUTPATIENT)
Dept: MEDICAL GROUP | Facility: MEDICAL CENTER | Age: 77
End: 2022-03-28
Payer: MEDICARE

## 2022-04-12 NOTE — TELEPHONE ENCOUNTER
FELIBERTOM for pt to call back and schedule an initial vascular visit w/ Dr. Bloch.   Dutasteride Male Counseling: Dustasteride Counseling:  I discussed with the patient the risks of use of dutasteride including but not limited to decreased libido, decreased ejaculate volume, and gynecomastia. Women who can become pregnant should not handle medication.  All of the patient's questions and concerns were addressed. Dutasteride Counseling: Dustasteride Counseling:  I discussed with the patient the risks of use of dutasteride including but not limited to decreased libido, decreased ejaculate volume, and gynecomastia. Women who can become pregnant should not handle medication.  All of the patient's questions and concerns were addressed.

## 2022-05-21 DIAGNOSIS — E78.2 MIXED HYPERLIPIDEMIA: ICD-10-CM

## 2022-05-21 DIAGNOSIS — I25.10 CORONARY ARTERY DISEASE INVOLVING NATIVE CORONARY ARTERY OF NATIVE HEART WITHOUT ANGINA PECTORIS: ICD-10-CM

## 2022-05-23 RX ORDER — ATORVASTATIN CALCIUM 20 MG/1
20 TABLET, FILM COATED ORAL DAILY
Qty: 100 TABLET | Refills: 0 | Status: SHIPPED | OUTPATIENT
Start: 2022-05-23 | End: 2022-08-08

## 2022-05-26 ENCOUNTER — HOSPITAL ENCOUNTER (OUTPATIENT)
Dept: LAB | Facility: MEDICAL CENTER | Age: 77
End: 2022-05-26
Attending: INTERNAL MEDICINE
Payer: MEDICARE

## 2022-05-26 DIAGNOSIS — I10 ESSENTIAL HYPERTENSION: ICD-10-CM

## 2022-05-26 DIAGNOSIS — E89.0 POSTOPERATIVE HYPOTHYROIDISM: ICD-10-CM

## 2022-05-26 DIAGNOSIS — E78.2 MIXED HYPERLIPIDEMIA: ICD-10-CM

## 2022-05-26 LAB
ALBUMIN SERPL BCP-MCNC: 4.3 G/DL (ref 3.2–4.9)
ALBUMIN/GLOB SERPL: 1.6 G/DL
ALP SERPL-CCNC: 58 U/L (ref 30–99)
ALT SERPL-CCNC: 18 U/L (ref 2–50)
ANION GAP SERPL CALC-SCNC: 11 MMOL/L (ref 7–16)
AST SERPL-CCNC: 14 U/L (ref 12–45)
BASOPHILS # BLD AUTO: 1.2 % (ref 0–1.8)
BASOPHILS # BLD: 0.07 K/UL (ref 0–0.12)
BILIRUB SERPL-MCNC: 0.5 MG/DL (ref 0.1–1.5)
BUN SERPL-MCNC: 21 MG/DL (ref 8–22)
CALCIUM SERPL-MCNC: 8.8 MG/DL (ref 8.5–10.5)
CHLORIDE SERPL-SCNC: 105 MMOL/L (ref 96–112)
CHOLEST SERPL-MCNC: 155 MG/DL (ref 100–199)
CO2 SERPL-SCNC: 25 MMOL/L (ref 20–33)
CREAT SERPL-MCNC: 0.91 MG/DL (ref 0.5–1.4)
EOSINOPHIL # BLD AUTO: 0.07 K/UL (ref 0–0.51)
EOSINOPHIL NFR BLD: 1.2 % (ref 0–6.9)
ERYTHROCYTE [DISTWIDTH] IN BLOOD BY AUTOMATED COUNT: 43 FL (ref 35.9–50)
FASTING STATUS PATIENT QL REPORTED: NORMAL
GFR SERPLBLD CREATININE-BSD FMLA CKD-EPI: 88 ML/MIN/1.73 M 2
GLOBULIN SER CALC-MCNC: 2.7 G/DL (ref 1.9–3.5)
GLUCOSE SERPL-MCNC: 98 MG/DL (ref 65–99)
HCT VFR BLD AUTO: 45.8 % (ref 42–52)
HDLC SERPL-MCNC: 50 MG/DL
HGB BLD-MCNC: 15.8 G/DL (ref 14–18)
IMM GRANULOCYTES # BLD AUTO: 0.03 K/UL (ref 0–0.11)
IMM GRANULOCYTES NFR BLD AUTO: 0.5 % (ref 0–0.9)
LDLC SERPL CALC-MCNC: 86 MG/DL
LYMPHOCYTES # BLD AUTO: 1.6 K/UL (ref 1–4.8)
LYMPHOCYTES NFR BLD: 27.9 % (ref 22–41)
MCH RBC QN AUTO: 34.2 PG (ref 27–33)
MCHC RBC AUTO-ENTMCNC: 34.5 G/DL (ref 33.7–35.3)
MCV RBC AUTO: 99.1 FL (ref 81.4–97.8)
MONOCYTES # BLD AUTO: 0.39 K/UL (ref 0–0.85)
MONOCYTES NFR BLD AUTO: 6.8 % (ref 0–13.4)
NEUTROPHILS # BLD AUTO: 3.58 K/UL (ref 1.82–7.42)
NEUTROPHILS NFR BLD: 62.4 % (ref 44–72)
NRBC # BLD AUTO: 0 K/UL
NRBC BLD-RTO: 0 /100 WBC
PLATELET # BLD AUTO: 232 K/UL (ref 164–446)
PMV BLD AUTO: 10.9 FL (ref 9–12.9)
POTASSIUM SERPL-SCNC: 4.1 MMOL/L (ref 3.6–5.5)
PROT SERPL-MCNC: 7 G/DL (ref 6–8.2)
RBC # BLD AUTO: 4.62 M/UL (ref 4.7–6.1)
SODIUM SERPL-SCNC: 141 MMOL/L (ref 135–145)
TRIGL SERPL-MCNC: 97 MG/DL (ref 0–149)
TSH SERPL DL<=0.005 MIU/L-ACNC: 1.28 UIU/ML (ref 0.38–5.33)
WBC # BLD AUTO: 5.7 K/UL (ref 4.8–10.8)

## 2022-05-26 PROCEDURE — 84432 ASSAY OF THYROGLOBULIN: CPT

## 2022-05-26 PROCEDURE — 84443 ASSAY THYROID STIM HORMONE: CPT

## 2022-05-26 PROCEDURE — 80053 COMPREHEN METABOLIC PANEL: CPT

## 2022-05-26 PROCEDURE — 80061 LIPID PANEL: CPT

## 2022-05-26 PROCEDURE — 85025 COMPLETE CBC W/AUTO DIFF WBC: CPT

## 2022-05-26 PROCEDURE — 86800 THYROGLOBULIN ANTIBODY: CPT

## 2022-05-26 PROCEDURE — 36415 COLL VENOUS BLD VENIPUNCTURE: CPT

## 2022-06-02 ENCOUNTER — OFFICE VISIT (OUTPATIENT)
Dept: MEDICAL GROUP | Facility: MEDICAL CENTER | Age: 77
End: 2022-06-02
Payer: MEDICARE

## 2022-06-02 VITALS
HEIGHT: 68 IN | SYSTOLIC BLOOD PRESSURE: 112 MMHG | DIASTOLIC BLOOD PRESSURE: 68 MMHG | RESPIRATION RATE: 16 BRPM | HEART RATE: 61 BPM | BODY MASS INDEX: 30.64 KG/M2 | OXYGEN SATURATION: 97 % | TEMPERATURE: 97.9 F | WEIGHT: 202.16 LBS

## 2022-06-02 DIAGNOSIS — Z85.46 HISTORY OF PROSTATE CANCER: ICD-10-CM

## 2022-06-02 DIAGNOSIS — Z76.89 ENCOUNTER TO ESTABLISH CARE WITH NEW DOCTOR: ICD-10-CM

## 2022-06-02 DIAGNOSIS — M1A.0720 CHRONIC GOUT OF LEFT FOOT, UNSPECIFIED CAUSE: ICD-10-CM

## 2022-06-02 DIAGNOSIS — E89.0 POSTOPERATIVE HYPOTHYROIDISM: ICD-10-CM

## 2022-06-02 DIAGNOSIS — E78.2 MIXED HYPERLIPIDEMIA: ICD-10-CM

## 2022-06-02 DIAGNOSIS — I10 ESSENTIAL HYPERTENSION: ICD-10-CM

## 2022-06-02 DIAGNOSIS — Z12.11 COLON CANCER SCREENING: ICD-10-CM

## 2022-06-02 DIAGNOSIS — H40.9 GLAUCOMA, UNSPECIFIED GLAUCOMA TYPE, UNSPECIFIED LATERALITY: ICD-10-CM

## 2022-06-02 DIAGNOSIS — Z00.00 PREVENTATIVE HEALTH CARE: ICD-10-CM

## 2022-06-02 DIAGNOSIS — I25.10 CORONARY ARTERY DISEASE INVOLVING NATIVE CORONARY ARTERY OF NATIVE HEART WITHOUT ANGINA PECTORIS: ICD-10-CM

## 2022-06-02 PROBLEM — M1A.9XX0 CHRONIC GOUT: Status: ACTIVE | Noted: 2021-06-24

## 2022-06-02 PROCEDURE — 99214 OFFICE O/P EST MOD 30 MIN: CPT | Performed by: STUDENT IN AN ORGANIZED HEALTH CARE EDUCATION/TRAINING PROGRAM

## 2022-06-02 ASSESSMENT — PATIENT HEALTH QUESTIONNAIRE - PHQ9: CLINICAL INTERPRETATION OF PHQ2 SCORE: 0

## 2022-06-02 ASSESSMENT — FIBROSIS 4 INDEX: FIB4 SCORE: 1.07

## 2022-06-02 NOTE — PROGRESS NOTES
Subjective:     CC:  Diagnoses of Essential hypertension, Mixed hyperlipidemia, Coronary artery disease involving native coronary artery of native heart without angina pectoris, Postoperative hypothyroidism, Glaucoma, unspecified glaucoma type, unspecified laterality, Chronic gout of left foot, unspecified cause, History of prostate cancer, BMI 30.0-30.9,adult, Preventative health care, Colon cancer screening, and Encounter to establish care with new doctor were pertinent to this visit.    HISTORY OF THE PRESENT ILLNESS: Patient is a 77 y.o. male. This pleasant patient is here today to establish care and discuss chronic conditions. His prior PCP was Dr. Nugent.    Problem   Preventative Health Care    Patient is here to establish care today. Feels well overall.     Bmi 30.0-30.9,Adult    Chronic condition. He tries to eat healthy in general. He does not regularly exercise.     Glaucoma (Increased Eye Pressure)    He reports optometrist said he should be referred to ophthalmologist.     Chronic Gout    Chronic condition. His last attack was 06/2021.  Left great toe gout attack few weeks ago that resolved with colchicine   The day before he had a lot of sushi  His uric acid 6.5  Healthful lifestyle measures discussed to prevent gout.       Postoperative Hypothyroidism    Chronic condition.  Thyroid nodules discovered as an incidental finding on carotid imaging. He is status post total thyroidectomy on 10/23/18.  He completed a whole body iodine scan on 12/13/19 which did not show metastatic thyroid cancer. He follows with endocrinology who are working to adjust his levothyroxine based on hormone levels.     He denies any signs or symptoms or over-treatment or under-treatment at this time.  He follows up with endocrinology in September 2020.  The his most recent lab work demonstrates mild overtreatment but he denies any diarrhea, tremulousness, anxiety, or palpitations.  He plans to wait until he sees Dr. Cardona but  suspects that he will need to go down on his levothyroxine dose.  He is currently taking levothyroxine 125 mcg daily.     Cad (Coronary Artery Disease)    Chronic condition. Followed by cardiology Dr. Garcia annually.  Status post cardiac triple bypass surgery with left internal mammary artery graft to left anterior descending artery, saphenous vein graft to diagonal branch, saphenous vein graft to distal obtuse marginal branch by Dr. Richardson (12/15/14).  Plavix, atorvastatin, losartan, metoprolol  Denies chest pain, SOB, palpitation, leg edema     Mixed Hyperlipidemia    Chronic condition.  He has a history of CABG, no history of cerebrovascular disease. He has carotid disease and hypertension. All of his lipid values are at goal on current regimen of atorvastatin 10 mg daily. No side effects from medication like myalgias or GI upset.      Essential Hypertension    Chronic condition.  He has been on blood pressure medication since he was in his early 60's. No signs or symptoms or orthostasis at this time. No chest pain or palpitations. Electrolytes and renal function are normal.     Current regimen: Losartan 100 mg daily, metoprolol tartrate 25 mg twice daily    Blood pressure readings in clinic have ranged from 106-130/60-78 between October 2019 and August 2020.    Stress test 2018:  Myocardial Perfusion   Report   NUCLEAR IMAGING INTERPRETATION   No evidence of significant jeopardized viable myocardium or prior myocardial infarction. Normal left ventricular size, ejection fraction, and wall motion. Nondiagnostic stress test.   ECG INTERPRETATION   Negative stress ECG for ischemia.   ECG   Resting ECG: Sinus rhythm. Mild nonspecific ST depression.   Stress ECG: Further ST depression with Regadenoson. This did not meet criteria for ischemia due to resting ECG abnormality.      Ref. Range 7/31/2020 08:07   Sodium Latest Ref Range: 135 - 145 mmol/L 140   Potassium Latest Ref Range: 3.6 - 5.5 mmol/L 3.9   Chloride  Latest Ref Range: 96 - 112 mmol/L 101   Bun Latest Ref Range: 8 - 22 mg/dL 13   Creatinine Latest Ref Range: 0.50 - 1.40 mg/dL 0.86   GFR If Non  Latest Ref Range: >60 mL/min/1.73 m 2 >60        History of Prostate Cancer    Chronic condition. He was diagnosed in 2002 and underwent total prostatectomy due to progression in 2008. He has post-operative incontinence for which he uses pads and briefs.     PSA 0.02 in 3/2017     Ref. Range 12/2/2014 06:59 3/8/2017 08:53 2/18/2021 08:07   Prostatic Specific Antigen Tot Latest Ref Range: 0.00 - 4.00 ng/mL 0.01 0.02 0.13              Current Outpatient Medications Ordered in Epic   Medication Sig Dispense Refill   • atorvastatin (LIPITOR) 20 MG Tab Take 1 Tablet by mouth every day. MUST BE SEEN FOR FURTHER REFILLS 100 Tablet 0   • losartan (COZAAR) 100 MG Tab TAKE 1 TABLET BY MOUTH EVERY  Tablet 3   • metoprolol tartrate (LOPRESSOR) 25 MG Tab TAKE 1 TABLET BY MOUTH 2 TIMES A DAY. TAKE 1 TABLET BY MOUTH TWICE A  Tablet 3   • levothyroxine (SYNTHROID) 125 MCG Tab TAKE 1 TABLET BY MOUTH EVERY DAY IN THE MORNING ON AN EMPTY STOMACH 90 Tablet 3   • clopidogrel (PLAVIX) 75 MG Tab Take 1 Tablet by mouth every day. 100 Tablet 3   • Multiple Vitamins-Minerals (MULTIVITAMIN ADULT PO) Take 1 Tab by mouth every day.     • Calcium 600 MG Tab Take 1 Tab by mouth every day.     • colchicine (COLCRYS) 0.6 MG Tab DAY 1: 1.2 MG ONCE, FOLLOWED IN 1 HOUR WITH A SINGLE DOSE OF 0.6 MG. DAY 2 AND THEREAFTER: ORAL: 0.6 MG ONCE DAILY UNTIL FLARE RESOLVES 30 Tablet 3     No current Epic-ordered facility-administered medications on file.     Social history  Living situation: lives with wife at home  Occupation: works as   Marital status:   Alcohol/tobacco/illicit drugs: never smoker, denies EtOH or illicit drugs  Baseline functional status: independent with ADLs    Health Maintenance: reviewed  Vaccines: flu received 10/2021, Tdap 03/2017, Shingrix  "completed 08/2020, Moderna COVID #3 received 10/2021  Colonoscopy due    ROS:   Gen: no fevers/chills, no changes in weight  Eyes: no changes in vision  ENT: no sore throat  Pulm: no sob, no cough  CV: no chest pain, no palpitations  GI: no nausea/vomiting, no diarrhea  : no dysuria  MSk: no myalgias  Skin: no rash  Neuro: no headaches, no numbness/tingling      Objective:     Exam: /68 (BP Location: Left arm, Patient Position: Sitting, BP Cuff Size: Adult)   Pulse 61   Temp 36.6 °C (97.9 °F) (Temporal)   Resp 16   Ht 1.727 m (5' 8\")   Wt 91.7 kg (202 lb 2.6 oz)   SpO2 97%  Body mass index is 30.74 kg/m².    General: Normal appearing. No distress.  HEENT: Normocephalic. Nasal mucosa benign, oropharynx is without erythema, edema or exudates.   Neck: Supple without JVD or bruit. Thyroid is not enlarged.  Pulmonary: Clear to ausculation. Normal effort. No rales, ronchi, or wheezing.  Cardiovascular: Regular rate and rhythm without murmur. Carotid and radial pulses are intact and equal bilaterally.  Abdomen: Soft, nontender, nondistended. Normal bowel sounds.  Neurologic: Grossly nonfocal  Skin: Warm and dry. No obvious lesions.  Musculoskeletal: Normal gait. No extremity cyanosis, clubbing, or edema.  Psych: Normal mood and affect. Alert and oriented x3. Judgment and insight is normal.    Labs:   Lab Results   Component Value Date/Time    WBC 5.7 05/26/2022 07:57 AM    RBC 4.62 (L) 05/26/2022 07:57 AM    HEMOGLOBIN 15.8 05/26/2022 07:57 AM    HEMATOCRIT 45.8 05/26/2022 07:57 AM    MCV 99.1 (H) 05/26/2022 07:57 AM    MCH 34.2 (H) 05/26/2022 07:57 AM    MCHC 34.5 05/26/2022 07:57 AM    RDW 43.0 05/26/2022 07:57 AM    PLATELETCT 232 05/26/2022 07:57 AM    MPV 10.9 05/26/2022 07:57 AM      Lab Results   Component Value Date/Time    SODIUM 141 05/26/2022 07:57 AM    POTASSIUM 4.1 05/26/2022 07:57 AM    CHLORIDE 105 05/26/2022 07:57 AM    CO2 25 05/26/2022 07:57 AM    ANION 11.0 05/26/2022 07:57 AM    GLUCOSE " 98 05/26/2022 07:57 AM    BUN 21 05/26/2022 07:57 AM    CREATININE 0.91 05/26/2022 07:57 AM    CALCIUM 8.8 05/26/2022 07:57 AM    ASTSGOT 14 05/26/2022 07:57 AM    ALTSGPT 18 05/26/2022 07:57 AM    TBILIRUBIN 0.5 05/26/2022 07:57 AM    ALBUMIN 4.3 05/26/2022 07:57 AM    TOTPROTEIN 7.0 05/26/2022 07:57 AM    GLOBULIN 2.7 05/26/2022 07:57 AM    AGRATIO 1.6 05/26/2022 07:57 AM     Lab Results   Component Value Date/Time    CHOLSTRLTOT 155 05/26/2022 0757    TRIGLYCERIDE 97 05/26/2022 0757    HDL 50 05/26/2022 0757    LDL 86 05/26/2022 0757     Lab Results   Component Value Date/Time    TSHULTRASEN 1.280 05/26/2022 0757       Assessment & Plan:   77 y.o. male with the following -    1. Essential hypertension  Chronic condition, stable.  - cont current regimen: losartan 100mg qd    2. Mixed hyperlipidemia  Chronic condition, stable.  - cont current regimen: atorvastatin 20mg qhs    3. Coronary artery disease involving native coronary artery of native heart without angina pectoris  Chronic condition, stable.  - cont current regimen: Plavix 75mg qd, atorvastatin 20mg qhs, metoprolol tartrate 25mg BID, losartan 100mg qd    4. Postoperative hypothyroidism  Chronic condition, stable.  - cont current regimen: levothyroxine 125mcg qAM    5. Glaucoma, unspecified glaucoma type, unspecified laterality  - Referral to Ophthalmology    6. Chronic gout of left foot, unspecified cause  Chronic condition, stable.  - cont current regimen: colchicine 0.6mg as needed for flare    7. History of prostate cancer  - cont interval PSA monitoring    8. BMI 30.0-30.9,adult  - Patient identified as having weight management issue.  Appropriate orders and counseling given.    9. Preventative health care  - up to date on vaccines    10. Colon cancer screening  - Referral to GI for Colonoscopy    11. Encounter to establish care with new doctor      Return in about 6 months (around 12/2/2022) for chronic medical conditions.    Please note that this  dictation was created using voice recognition software. I have made every reasonable attempt to correct obvious errors, but I expect that there are errors of grammar and possibly content that I did not discover before finalizing the note.

## 2022-07-06 ENCOUNTER — HOSPITAL ENCOUNTER (OUTPATIENT)
Dept: CARDIOLOGY | Facility: MEDICAL CENTER | Age: 77
End: 2022-07-06
Attending: INTERNAL MEDICINE
Payer: MEDICARE

## 2022-07-06 DIAGNOSIS — I35.0 NONRHEUMATIC AORTIC VALVE STENOSIS: ICD-10-CM

## 2022-07-06 DIAGNOSIS — M1A.0720 CHRONIC GOUT OF LEFT FOOT, UNSPECIFIED CAUSE: ICD-10-CM

## 2022-07-06 LAB
LV EJECT FRACT  99904: 60
LV EJECT FRACT MOD 2C 99903: 73.54
LV EJECT FRACT MOD 4C 99902: 60.74
LV EJECT FRACT MOD BP 99901: 60.65

## 2022-07-06 PROCEDURE — 93306 TTE W/DOPPLER COMPLETE: CPT | Mod: 26 | Performed by: INTERNAL MEDICINE

## 2022-07-06 PROCEDURE — 93306 TTE W/DOPPLER COMPLETE: CPT

## 2022-07-07 ENCOUNTER — PATIENT MESSAGE (OUTPATIENT)
Dept: HEALTH INFORMATION MANAGEMENT | Facility: OTHER | Age: 77
End: 2022-07-07

## 2022-08-05 ENCOUNTER — TELEPHONE (OUTPATIENT)
Dept: HEALTH INFORMATION MANAGEMENT | Facility: OTHER | Age: 77
End: 2022-08-05
Payer: MEDICARE

## 2022-08-08 DIAGNOSIS — E78.2 MIXED HYPERLIPIDEMIA: ICD-10-CM

## 2022-08-08 DIAGNOSIS — I25.10 CORONARY ARTERY DISEASE INVOLVING NATIVE CORONARY ARTERY OF NATIVE HEART WITHOUT ANGINA PECTORIS: ICD-10-CM

## 2022-08-08 NOTE — TELEPHONE ENCOUNTER
Is the patient due for a refill? Yes    Was the patient seen the past year? No    Date of last office visit: 8/03/21    Does the patient have an upcoming appointment?  Yes   If yes, When? 9/13/22    Provider to refill:CHERY    Does the patients insurance require a 100 day supply?  Yes

## 2022-08-09 RX ORDER — ATORVASTATIN CALCIUM 20 MG/1
20 TABLET, FILM COATED ORAL DAILY
Qty: 100 TABLET | Refills: 0 | Status: SHIPPED | OUTPATIENT
Start: 2022-08-09 | End: 2022-11-10

## 2022-08-17 PROBLEM — U07.1 COVID-19: Status: ACTIVE | Noted: 2022-08-17

## 2022-08-18 ENCOUNTER — TELEMEDICINE (OUTPATIENT)
Dept: MEDICAL GROUP | Facility: MEDICAL CENTER | Age: 77
End: 2022-08-18
Payer: MEDICARE

## 2022-08-18 VITALS — HEIGHT: 68 IN | WEIGHT: 201 LBS | BODY MASS INDEX: 30.46 KG/M2

## 2022-08-18 DIAGNOSIS — U07.1 COVID-19: ICD-10-CM

## 2022-08-18 PROCEDURE — 99214 OFFICE O/P EST MOD 30 MIN: CPT | Mod: 95 | Performed by: STUDENT IN AN ORGANIZED HEALTH CARE EDUCATION/TRAINING PROGRAM

## 2022-08-18 ASSESSMENT — FIBROSIS 4 INDEX: FIB4 SCORE: 1.07

## 2022-08-18 NOTE — PATIENT INSTRUCTIONS
Please copy and paste link below into browser to read the Paxlovid fact sheet before taking the medication.     https://www.fda.gov/media/512567/download

## 2022-08-18 NOTE — PROGRESS NOTES
Virtual Visit: Established Patient   This visit was conducted via Zoom using secure and encrypted videoconferencing technology.   The patient was in their home in the [facility].    The patient's identity was confirmed and verbal consent was obtained for this virtual visit.    Subjective:   CC:   Chief Complaint   Patient presents with    Coronavirus Screening       Joshua Becerra is a 77 y.o. male presenting for evaluation and management of:    Problem   Covid-19    Acute condition.    Date of symptoms onset: 8/15/2022  Symptoms: dry cough, fever 101.2F (resolved)  Denies fatigue, chest pain, shortness of breath, loss of smell/taste, impaired sleep  Date of COVID positive test: 8/17/2022  Medications tried: OTC Tylenol and Robitussin with mild relief  Home max temperature: 101.2F  Home O2 saturation: 98%  He has been able to tolerate PO. Normal urination.    COVID vaccination status: Moderna COVID #4 received 06/2022  Potential exposure: had a meeting with a client on Monday         ROS   See HPI    Current medicines (including changes today)  Current Outpatient Medications   Medication Sig Dispense Refill    Nirmatrelvir&Ritonavir 300/100 20 x 150 MG & 10 x 100MG Tablet Therapy Pack Take Nirmatrelvir 300mg with Ritonavir 100mg oral twice daily for 5 days 1 Each 0    atorvastatin (LIPITOR) 20 MG Tab TAKE 1 TABLET BY MOUTH EVERY DAY. MUST BE SEEN FOR FURTHER REFILLS 100 Tablet 0    losartan (COZAAR) 100 MG Tab TAKE 1 TABLET BY MOUTH EVERY  Tablet 3    metoprolol tartrate (LOPRESSOR) 25 MG Tab TAKE 1 TABLET BY MOUTH 2 TIMES A DAY. TAKE 1 TABLET BY MOUTH TWICE A  Tablet 3    levothyroxine (SYNTHROID) 125 MCG Tab TAKE 1 TABLET BY MOUTH EVERY DAY IN THE MORNING ON AN EMPTY STOMACH 90 Tablet 3    clopidogrel (PLAVIX) 75 MG Tab Take 1 Tablet by mouth every day. 100 Tablet 3    colchicine (COLCRYS) 0.6 MG Tab DAY 1: 1.2 MG ONCE, FOLLOWED IN 1 HOUR WITH A SINGLE DOSE OF 0.6 MG. DAY 2 AND  "THEREAFTER: ORAL: 0.6 MG ONCE DAILY UNTIL FLARE RESOLVES 30 Tablet 3    Multiple Vitamins-Minerals (MULTIVITAMIN ADULT PO) Take 1 Tab by mouth every day.      Calcium 600 MG Tab Take 1 Tab by mouth every day.       No current facility-administered medications for this visit.       Patient Active Problem List    Diagnosis Date Noted    COVID-19 08/17/2022    Preventative health care 06/02/2022    BMI 30.0-30.9,adult 06/02/2022    Glaucoma (increased eye pressure) 06/02/2022    Chronic gout 06/24/2021    Chronic pain of right knee 01/19/2021    Macrocytosis without anemia 01/19/2021    Chronic pain of both knees 01/26/2020    Neck pain 01/26/2020    Impaired fasting blood sugar 01/26/2020    Postoperative hypothyroidism 07/23/2019    History of papillary adenocarcinoma of thyroid 09/06/2018    Arthritis of right knee 09/16/2016    Bilateral low back pain without sciatica 09/16/2016    Nocturnal enuresis 10/28/2015    Other and combined forms of senile cataract 06/04/2015    Nonrheumatic aortic valve stenosis 02/10/2015    S/P CABG (coronary artery bypass graft) 12/15/2014    CAD (coronary artery disease) 12/14/2014    Carotid artery stenosis 12/14/2014    Mixed hyperlipidemia 12/12/2014    Essential hypertension 12/01/2014    H/O prostatectomy 09/03/2013    History of prostate cancer 09/03/2013        Objective:   Ht 1.727 m (5' 8\")   Wt 91.2 kg (201 lb)   BMI 30.56 kg/m²     Physical Exam: limited exam due to virtual visit  Constitutional: Alert, no distress, well-groomed.  Respiratory: Unlabored respiratory effort  Neuro: Alert and oriented x3, normal conversation.     Assessment and Plan:   The following treatment plan was discussed:     1. COVID-19  - Nirmatrelvir&Ritonavir 300/100 20 x 150 MG & 10 x 100MG Tablet Therapy Pack; Take Nirmatrelvir 300mg with Ritonavir 100mg oral twice daily for 5 days  Dispense: 1 Each; Refill: 0    Acute condition, clinically appears mild and stable. Unclear prognosis at this time " "given the nature of this disease. We discussed options for anti-viral medication treatment and patient would like to proceed with Paxlovid treatment. He does have chronic cardiac conditions which may put him at higher risk for progression from COVID-19 infection.   - advised to hold atorvastatin for 10 days while taking Paxlovid  - cont symptomatic therapy as needed: OTC Tylenol as needed for pain/headache/fever, over the counter Robitussin as needed for cough. Call or return to clinic prn if these symptoms worsen or fail to improve as anticipated.  - advised to maintain adequate hydration, regular handwashing, facemask, social distancing, isolate at home through Saturday 8/20/2022  - advised to monitor pulse oximetry at home and to maintain O2 sat 90% or above  - may consider trying over the counter supplements to help with your symptoms: vitamin C 500mg two times daily, vitamin D3 2000 international units daily, zinc 75-100mg daily, melatonin 5-10mg before bedtime as needed  - encouraged patient to get COVID-19 vaccine once she recovers from acute infection  - strict ED precautions discussed if worsening mental status, chest pain, shortness of breath, lack of urination     Date of symptom onset: 8/15/2022  Date of positive COVID test: 8/17/2022    With regard to treatment with Paxlovid:   I have provided the patient with the \"fact sheet for patients and parents/caregivers\".   I informed them of therapeutic alternatives to Paxlovid and the risks and benefits of those alternatives.   I informed them that they have the option to accept or refuse Paxlovid.   I informed them that Paxlovid is not FDA approved, but that it is authorized for use under emergency by the FDA.   I informed them of the known risks and benefits of Paxlovid and discussed the extent to which such risks and benefits are unknown.   The patient consents to undergoing treatment with Paxlovid.    Follow-up: Return if symptoms worsen or fail to " improve.

## 2022-08-19 DIAGNOSIS — U07.1 COVID-19: ICD-10-CM

## 2022-08-19 RX ORDER — BENZONATATE 100 MG/1
100 CAPSULE ORAL 3 TIMES DAILY PRN
Qty: 30 CAPSULE | Refills: 0 | Status: SHIPPED | OUTPATIENT
Start: 2022-08-19 | End: 2022-12-06

## 2022-08-20 NOTE — PROGRESS NOTES
Patient has not been seen since 11/2020 so would need a visit before a new machine can be ordered due to insurance requirements.  Patient is also only 53% compliant, so would need to increase usage for insurance to approve a new machine or Supplies.  Patient's sleep study was completed on 8/1/19 and scored with Medicare's current criteria, so a new study is not needed.  [facility] has only 3 clinic days before the patient plans to leave, and has no triage or available spots (these are fully booked).  Dr. Cook also has no availability. (As an FYI patient has only ever been seen by [facility], not by any other provider in our office).    90 day download was printed for provider to review and advise if any changes are recommended on current treatment.  Thank you!   Rx for Tessalon sent.

## 2022-09-13 ENCOUNTER — OFFICE VISIT (OUTPATIENT)
Dept: CARDIOLOGY | Facility: MEDICAL CENTER | Age: 77
End: 2022-09-13
Payer: MEDICARE

## 2022-09-13 VITALS
SYSTOLIC BLOOD PRESSURE: 122 MMHG | BODY MASS INDEX: 30.31 KG/M2 | HEART RATE: 66 BPM | RESPIRATION RATE: 16 BRPM | WEIGHT: 200 LBS | HEIGHT: 68 IN | DIASTOLIC BLOOD PRESSURE: 68 MMHG | OXYGEN SATURATION: 95 %

## 2022-09-13 DIAGNOSIS — I25.10 CORONARY ARTERY DISEASE INVOLVING NATIVE CORONARY ARTERY OF NATIVE HEART WITHOUT ANGINA PECTORIS: ICD-10-CM

## 2022-09-13 DIAGNOSIS — I35.0 NONRHEUMATIC AORTIC VALVE STENOSIS: ICD-10-CM

## 2022-09-13 DIAGNOSIS — Z95.1 S/P CABG (CORONARY ARTERY BYPASS GRAFT): ICD-10-CM

## 2022-09-13 DIAGNOSIS — I65.23 BILATERAL CAROTID ARTERY STENOSIS: ICD-10-CM

## 2022-09-13 DIAGNOSIS — I10 ESSENTIAL HYPERTENSION: ICD-10-CM

## 2022-09-13 DIAGNOSIS — E78.2 MIXED HYPERLIPIDEMIA: ICD-10-CM

## 2022-09-13 PROCEDURE — 99214 OFFICE O/P EST MOD 30 MIN: CPT | Performed by: INTERNAL MEDICINE

## 2022-09-13 ASSESSMENT — ENCOUNTER SYMPTOMS
COUGH: 0
MUSCULOSKELETAL NEGATIVE: 1
PALPITATIONS: 0
NERVOUS/ANXIOUS: 0
NAUSEA: 0
CLAUDICATION: 0
WEAKNESS: 0
GASTROINTESTINAL NEGATIVE: 1
CARDIOVASCULAR NEGATIVE: 1
BRUISES/BLEEDS EASILY: 0
HEADACHES: 0
PSYCHIATRIC NEGATIVE: 1
CHILLS: 0
DOUBLE VISION: 0
DEPRESSION: 0
FOCAL WEAKNESS: 0
FEVER: 0
EYES NEGATIVE: 1
BLURRED VISION: 0
DIZZINESS: 0
ABDOMINAL PAIN: 0
VOMITING: 0
WEIGHT LOSS: 0
MYALGIAS: 0
RESPIRATORY NEGATIVE: 1
SHORTNESS OF BREATH: 0
NEUROLOGICAL NEGATIVE: 1

## 2022-09-13 ASSESSMENT — FIBROSIS 4 INDEX: FIB4 SCORE: 1.07

## 2022-09-13 NOTE — PROGRESS NOTES
Chief Complaint   Patient presents with    Hyperlipidemia     F/V Dx: Mixed hyperlipidemia       Subjective     Ed Ed Becerra is a 77 y.o. male who presents today for annual follow up of 08/03/21.    Since the patient's last visit on 08/03/21, he has been doing well clinically. He denies chest pain, shortness of breath, palpitations, nausea/vomiting or diaphoresis. He has been less active since fidel COVID last month due to fatigue.    Past Medical History:   Diagnosis Date    CAD (coronary artery disease)     Cancer (HCC)     Prostate CA- Prostatectomy done    Carotid artery disease (HCC)     Disorder of thyroid 10/18/2018    High cholesterol     Hyperlipidemia     Hypertension     Unspecified cataract     Unspecified urinary incontinence 10/18/2018    Valvular heart disease      Past Surgical History:   Procedure Laterality Date    THYROIDECTOMY TOTAL  10/23/2018    Procedure: THYROIDECTOMY TOTAL;  Surgeon: Angel Adams M.D.;  Location: [facility];  Service: Ent    NECK DISSECTION  10/23/2018    Procedure: NECK DISSECTION - POSSIBLE LIMITED CENTRAL;  Surgeon: Angel Adams M.D.;  Location: SURGERY [facility] [place];  Service: Ent    CATARACT PHACO WITH IOL Left 7/2/2015    Procedure: CATARACT PHACO WITH IOL;  Surgeon: Matty Augustin M.D.;  Location: [facility];  Service:     CATARACT PHACO WITH IOL Right 6/4/2015    Procedure: CATARACT PHACO WITH IOL;  Surgeon: Matty Augustin M.D.;  Location: [facility];  Service:     MULTIPLE CORONARY ARTERY BYPASS ENDO VEIN HARVEST  12/15/2014    Performed by Gustavo Richardson M.D. at SURGERY [facility] [place]    PROSTATECTOMY, RADICAL RETRO  12/17/2008     Family History   Problem Relation Age of Onset    Cancer Sister         colon    Cancer Brother         prostate    Heart Disease Mother     Arthritis Mother     Hypertension Mother     Hyperlipidemia Mother     Stroke Mother     Heart Disease Father     Hypertension  Father     Hyperlipidemia Father     Stroke Father     Arthritis Brother     Hyperlipidemia Brother     Diabetes Neg Hx      Social History     Socioeconomic History    Marital status:      Spouse name: Not on file    Number of children: Not on file    Years of education: Not on file    Highest education level: Bachelor's degree (e.g., BA, AB, BS)   Occupational History    Not on file   Tobacco Use    Smoking status: Never    Smokeless tobacco: Never    Tobacco comments:     continued abstinence   Vaping Use    Vaping Use: Never used   Substance and Sexual Activity    Alcohol use: No     Alcohol/week: 0.0 oz    Drug use: No    Sexual activity: Not Currently     Partners: Female     Comment: s/p prostatectomy   Other Topics Concern    Not on file   Social History Narrative    Ed was born and raised in the [facility]. He has been in [place] since 1989. He continues to work doing taxes, he is a CPA. He is  to Aleksandra for 50 years, they met in college. They have 3 kids- Larissa (49; [place]), Marina (46; [place]), Atif (39; [place]). They enjoy spending times with their grandkids, 4 in [place] and 2 in [place].      Social Determinants of Health     Financial Resource Strain: Not on file   Food Insecurity: Not on file   Transportation Needs: Not on file   Physical Activity: Not on file   Stress: Not on file   Social Connections: Not on file   Intimate Partner Violence: Not on file   Housing Stability: Not on file     No Known Allergies    (Medications reviewed.)  Outpatient Encounter Medications as of 9/13/2022   Medication Sig Dispense Refill    benzonatate (TESSALON) 100 MG Cap Take 1 Capsule by mouth 3 times a day as needed for Cough. 30 Capsule 0    atorvastatin (LIPITOR) 20 MG Tab TAKE 1 TABLET BY MOUTH EVERY DAY. MUST BE SEEN FOR FURTHER REFILLS 100 Tablet 0    losartan (COZAAR) 100 MG Tab TAKE 1 TABLET BY MOUTH EVERY  Tablet 3    metoprolol tartrate (LOPRESSOR) 25 MG Tab TAKE 1 TABLET BY MOUTH 2 TIMES A  DAY. TAKE 1 TABLET BY MOUTH TWICE A  Tablet 3    levothyroxine (SYNTHROID) 125 MCG Tab TAKE 1 TABLET BY MOUTH EVERY DAY IN THE MORNING ON AN EMPTY STOMACH 90 Tablet 3    clopidogrel (PLAVIX) 75 MG Tab Take 1 Tablet by mouth every day. 100 Tablet 3    Multiple Vitamins-Minerals (MULTIVITAMIN ADULT PO) Take 1 Tab by mouth every day.      Calcium 600 MG Tab Take 1 Tab by mouth every day.      [DISCONTINUED] Nirmatrelvir&Ritonavir 300/100 20 x 150 MG & 10 x 100MG Tablet Therapy Pack Take Nirmatrelvir 300mg with Ritonavir 100mg oral twice daily for 5 days (Patient not taking: Reported on 9/13/2022) 1 Each 0    [DISCONTINUED] colchicine (COLCRYS) 0.6 MG Tab DAY 1: 1.2 MG ONCE, FOLLOWED IN 1 HOUR WITH A SINGLE DOSE OF 0.6 MG. DAY 2 AND THEREAFTER: ORAL: 0.6 MG ONCE DAILY UNTIL FLARE RESOLVES (Patient not taking: Reported on 9/13/2022) 30 Tablet 3     No facility-administered encounter medications on file as of 9/13/2022.     Review of Systems   Constitutional:  Positive for malaise/fatigue. Negative for chills, fever and weight loss.   HENT: Negative.  Negative for hearing loss.    Eyes: Negative.  Negative for blurred vision and double vision.   Respiratory: Negative.  Negative for cough and shortness of breath.    Cardiovascular: Negative.  Negative for chest pain, palpitations, claudication and leg swelling.   Gastrointestinal: Negative.  Negative for abdominal pain, nausea and vomiting.   Genitourinary: Negative.  Negative for dysuria and urgency.   Musculoskeletal: Negative.  Negative for joint pain and myalgias.   Skin: Negative.  Negative for itching and rash.   Neurological: Negative.  Negative for dizziness, focal weakness, weakness and headaches.   Endo/Heme/Allergies: Negative.  Does not bruise/bleed easily.   Psychiatric/Behavioral: Negative.  Negative for depression. The patient is not nervous/anxious.             Objective     /68 (BP Location: Left arm, Patient Position: Sitting, BP Cuff Size:  "Adult)   Pulse 66   Resp 16   Ht 1.727 m (5' 8\")   Wt 90.7 kg (200 lb)   SpO2 95%   BMI 30.41 kg/m²     Physical Exam  Constitutional:       Appearance: Normal appearance. He is well-developed and normal weight.   HENT:      Head: Normocephalic and atraumatic.   Neck:      Vascular: No JVD.   Cardiovascular:      Rate and Rhythm: Normal rate and regular rhythm.      Heart sounds: Normal heart sounds.   Pulmonary:      Effort: Pulmonary effort is normal.      Breath sounds: Normal breath sounds.   Abdominal:      General: Bowel sounds are normal.      Palpations: Abdomen is soft.      Comments: No hepatosplenomegaly.   Musculoskeletal:         General: Normal range of motion.   Lymphadenopathy:      Cervical: No cervical adenopathy.   Skin:     General: Skin is warm and dry.   Neurological:      Mental Status: He is alert and oriented to person, place, and time.            CARDIAC STUDIES/PROCEDURES:     CARDIAC CATHETERIZATION CONCLUSIONS (12/13/14)  1. Two-vessel coronary artery disease with mid left anterior descending   artery, ostial diagonal branch and proximal circumflex artery stenosis.   2. Normal left ventricular systolic function with ejection fraction of 55%.   3. Mildly elevated left ventricular end diastolic pressure.   4. Mild aortic stenosis.     CAROTID ULTRASOUND (04/20/21)  1.  There is moderate amount of atherosclerotic plaque.  Plaque is located in carotid bulbs and proximal internal carotid arteries.  Plaque characterization:  Heterogeneous, partially calcified  2.  There is no evidence of carotid occlusion   3. Vertebral arteries demonstrate antegrade flow.  4. Diameter reduction in the internal carotid arteries: less than 50% where visualized. However, there is markedly decreased flow in the right ICA, with parvus-tardus waveform, suggestive of high-grade upstream ICA stenosis, most likely in the region of   right common carotid bifurcation. Consider further evaluation with neck CTA.   "   CAROTID ULTRASOUND (05/17/18)  Probable distal right internal carotid occlusion, chronic. No changes from 2017.  Mild stenosis of the left internal carotid (< 50%).   Flow within both subclavian arteries appears to be within normal limits.   Antegrade flow, bilateral vertebral arteries.   Incidental left-sided thyroid cyst noted.  Follow as indicated clinically.     CAROTID ULTRASOUND (12/13/14)  RIGHT:  Very mild smooth plaque of the common carotids & bifurcation with minimal   plaque in the proximal internal carotid artery. The ICA waveforms are   resistive with low peak systolic & end diastolic velocities which may   suggest distal obstruction or occlusion.  LEFT:  Very mild smooth plaque of the common carotids & bifurcation extending into   the internal carotid. Velocities are consistent with < 50% stenosis of the   internal carotid artery.   Bilateral subclavian and vertebral artery waveforms are antegrade and   waveforms are normal in character and velocity.      CT OF HEAD (12/18/14)  1. Occlusion or near occlusion of the intracranial right internal carotid artery with apparent tiny branch extending to the middle cerebral artery. The right middle cerebral artery appears to be supplied primarily by the anterior communicating artery.     CTA OF NECK (04/29/21)  1.  CTA neck stable since 12/14/2014  2.  Long segment approximately 80% stenosis of the right internal carotid artery extending from approximately 2 cm distal to the carotid bifurcation to the skull base. The supraclinoid portion has either occlusion with reconstitution or a very high-grade stenosis with reconstitution  3.  Findings are consistent with either a chronic dissection or atherosclerotic narrowing  4.  No other significant finding     CT OF NECK (12/18/14)  1. Diffuse narrowing of the right internal carotid artery beginning approximately 2 cm distal to its origin could be due to diffuse atherosclerotic disease or chronic dissection.  2.  Occlusion or near occlusion of the intracranial portion of the right internal carotid artery.  3. No evidence of left internal carotid artery stenosis.     ECHOCARDIOGRAM CONCLUSIONS (07/06/22)  Prior study 04/23/21, compared to the report of the prior study, there   has been no significant change.   Normal left ventricular systolic function.  The left ventricular ejection fraction is visually estimated to be 60%.  Mild aortic valve stenosis.  Vmax is 2.64 m/s. Transvalvular gradients are - Peak: 27  mmHg, Mean: 15  mmHg  Mild tricuspid regurgitation.  Right ventricular systolic pressure is estimated to be 35 mmHg.  (study result reviewed)    ECHOCARDIOGRAM CONCLUSIONS (04/23/21)  Prior echo 05/17/18. Compared to the report of the study done - there   has been no significant change.   Normal left ventricular systolic function.  Left ventricular ejection fraction is visually estimated to be 65%.  Mild mitral regurgitation.  Mild aortic stenosis.  Vmax is 2.3 m/s. Transvalvular gradients are - Peak: 22 mmHg, Mean: 14 mmHg.   Mild tricuspid regurgitation.  Estimated right ventricular systolic pressure is 35 mmHg.     ECHOCARDIOGRAM CONCLUSIONS (05/17/18)  Prior echo 5-18-17. Compared to the report of the study done - there has been no significant change.   Normal left ventricular systolic function.  Left ventricular ejection fraction is visually estimated to be 60%.  Grade II diastolic dysfunction.  Mild mitral regurgitation.  Aortic sclerosis with mild stenosis.  Vmax is 2.01 m/s. Transvalvular gradients are - Peak: 16 mmHg,  Mean: 9 mmHg.  Mild tricuspid regurgitation.  Estimated right ventricular systolic pressure is 45 mmHg.     ECHOCARDIOGRAM CONCLUSIONS (05/17/18)  Prior echo 5-18-17. Compared to the report of the study done - there has been no significant change.   Normal left ventricular systolic function.  Left ventricular ejection fraction is visually estimated to be 60%.  Grade II diastolic  dysfunction.  Mild mitral regurgitation.  Aortic sclerosis with mild stenosis.  Vmax is 2.01 m/s. Transvalvular gradients are - Peak: 16 mmHg, Mean: 9 mmHg.  Mild tricuspid regurgitation.  Estimated right ventricular systolic pressure is 45 mmHg.     ECHOCARDIOGRAM CONCLUSIONS (05/18/17)  Compared to the prior echo dated 7/9/15, the aortic valve still appears mildly stenotic.  1. Left ventricular ejection fraction is visually estimated to be 60%.   Grade II diastolic dysfunction.  2. There is mild aortic stenosis.  AV Peak Velocity 1.9 m/s                 AV Peak Gradient 14.6 mmHg               AV Mean Gradient 8 mmHg   3. Estimated right ventricular systolic pressure  is 30 mmHg.     ECHOCARDIOGRAM CONCLUSIONS (07/09/15)  Normal left ventricular systolic function.  Moderate concentric left ventricular hypertrophy.  Normal diastolic function - normal mitral inflow pattern and E/E' is normal.  Mitral annular calcification.  Mild mitral regurgitation.  Mild aortic stenosis.  Mild tricuspid regurgitation.     ECHOCARDIOGRAM CONCLUSIONS (12/12/14)  Technically good study.  Normal left ventricular systolic function.  Left ventricular ejection fraction is 60% to 65%.  Grade I diastolic dysfunction - mitral inflow E/A is <1.0.  Mild mitral regurgitation.  Mild aortic stenosis.  Mild tricuspid regurgitation.     EKG performed on (12/18/14) EKG shows atrial fibrillation. (Post CABG)  EKG performed on (12/13/14) EKG shows normal sinus rhythm.     Laboratory results of (05/26/22) were reviewed. Cholesterol profile of 1555/97/50/86 mg/dL noted.  Laboratory results of (07/22/21) Cholesterol profile of 147/119/57/66 mg/dL noted.  Laboratory results of (02/18/21) Cholesterol profile of 158/96/46/93 mg/dL noted.  Laboratory results of (12/20/19) Cholesterol profile of 141/118/44/73 mg/dl noted.  Laboratory results of (07/20/19) Cholesterol profile of 145/78/53/76 noted.  Laboratory results of (09/11/18) Cholesterol profile of  152/82/48/88 noted.  Laboratory results of (03/13/18) Cholesterol profile of 141/78/51/74 noted.  Laboratory results of (03/08/17) Cholesterol profile of 158/97/58/81 noted.  Laboratory results of (06/03/16) Cholesterol profile of 106/102/45/41 noted.  Laboratory results of (12/13/14) Cholesterol profile of 139/98/46/73 noted.     MYOCARDIAL PERFUSION STUDY CONCLUSIONS (09/17/18)  No evidence of significant jeopardized viable myocardium or prior myocardial infarction.  Normal left ventricular size, ejection fraction, and wall motion.  Nondiagnostic stress test.   ECG INTERPRETATION  Negative stress ECG for ischemia.     Assessment & Plan     1. Coronary artery disease involving native coronary artery of native heart without angina pectoris  NM-CARDIAC STRESS TEST    EC-ECHOCARDIOGRAM COMPLETE W/O CONT      2. S/P CABG (coronary artery bypass graft)  NM-CARDIAC STRESS TEST    EC-ECHOCARDIOGRAM COMPLETE W/O CONT      3. Nonrheumatic aortic valve stenosis        4. Essential hypertension  NM-CARDIAC STRESS TEST    EC-ECHOCARDIOGRAM COMPLETE W/O CONT      5. Mixed hyperlipidemia        6. Bilateral carotid artery stenosis  US-CAROTID DOPPLER BILAT          Medical Decision Making: Today's Assessment/Status/Plan:        Coronary artery disease with prior coronary bypass graft (x 3 with left internal mammary artery graft to left anterior descending artery, saphenous vein graft to diagonal branch, saphenous vein graft to distal obtuse marginal branch by Dr. Richardson 12/15/14): He is clinically doing well. I will continue with current medical care including clopidogrel, metoprolol, losartan, atorvastatin.  Aortic stenosis: He remains asymptomatic with mild aortic stenosis. We will follow him clinically and repeat an echocardiogram in one year.  Hypertension: Blood pressure is well controlled. We will continue with beta blockade therapy and angiotensin receptor blockade.  Hyperlipidemia: He is doing well on statin therapy  without myalgia symptoms.  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 No intervention is warranted per Andrea Askew on 12/14/14; Per Vito Barone 08/08/117: Based on the patient's asymptomatic status and the fact that the carotid artery occlusion is in the distal internal carotid artery which is surgically inaccessible I have recommended continued conservative management. The fact that he has no symptoms of TIA or stroke would led itself to a more conservative approach. I don't think that the would benefit from any type of neuro-interventional consultation based on the fact that the patient is asymptomatic.): Clinically stable on above medical therapy. We will repeat a carotid ultrasound.     We will follow up the patient in one year.    CC Ish Puentes

## 2022-09-19 ENCOUNTER — HOSPITAL ENCOUNTER (OUTPATIENT)
Dept: RADIOLOGY | Facility: MEDICAL CENTER | Age: 77
End: 2022-09-19
Attending: INTERNAL MEDICINE
Payer: MEDICARE

## 2022-09-19 DIAGNOSIS — I10 ESSENTIAL HYPERTENSION: ICD-10-CM

## 2022-09-19 DIAGNOSIS — Z95.1 S/P CABG (CORONARY ARTERY BYPASS GRAFT): ICD-10-CM

## 2022-09-19 DIAGNOSIS — I25.10 CORONARY ARTERY DISEASE INVOLVING NATIVE CORONARY ARTERY OF NATIVE HEART WITHOUT ANGINA PECTORIS: ICD-10-CM

## 2022-09-19 PROCEDURE — 93018 CV STRESS TEST I&R ONLY: CPT | Performed by: INTERNAL MEDICINE

## 2022-09-19 PROCEDURE — A9502 TC99M TETROFOSMIN: HCPCS

## 2022-09-19 PROCEDURE — 78452 HT MUSCLE IMAGE SPECT MULT: CPT | Mod: 26 | Performed by: INTERNAL MEDICINE

## 2022-09-19 PROCEDURE — 700111 HCHG RX REV CODE 636 W/ 250 OVERRIDE (IP)

## 2022-09-19 RX ORDER — REGADENOSON 0.08 MG/ML
INJECTION, SOLUTION INTRAVENOUS
Status: COMPLETED
Start: 2022-09-19 | End: 2022-09-19

## 2022-09-19 RX ORDER — AMINOPHYLLINE 25 MG/ML
100 INJECTION, SOLUTION INTRAVENOUS
Status: DISCONTINUED | OUTPATIENT
Start: 2022-09-19 | End: 2022-09-20 | Stop reason: HOSPADM

## 2022-09-19 RX ORDER — REGADENOSON 0.08 MG/ML
0.4 INJECTION, SOLUTION INTRAVENOUS ONCE
Status: COMPLETED | OUTPATIENT
Start: 2022-09-19 | End: 2022-09-19

## 2022-09-19 RX ADMIN — REGADENOSON 0.4 MG: 0.08 INJECTION, SOLUTION INTRAVENOUS at 14:19

## 2022-09-20 ENCOUNTER — NON-PROVIDER VISIT (OUTPATIENT)
Dept: MEDICAL GROUP | Facility: MEDICAL CENTER | Age: 77
End: 2022-09-20
Payer: MEDICARE

## 2022-09-20 DIAGNOSIS — Z23 NEED FOR VACCINATION: ICD-10-CM

## 2022-09-20 PROCEDURE — G0008 ADMIN INFLUENZA VIRUS VAC: HCPCS | Performed by: STUDENT IN AN ORGANIZED HEALTH CARE EDUCATION/TRAINING PROGRAM

## 2022-09-20 PROCEDURE — 90662 IIV NO PRSV INCREASED AG IM: CPT | Performed by: STUDENT IN AN ORGANIZED HEALTH CARE EDUCATION/TRAINING PROGRAM

## 2022-09-20 NOTE — PROGRESS NOTES
"Ed Ed Becerra is a 77 y.o. male here for a non-provider visit for:   FLU    Reason for immunization: Annual Flu Vaccine  Immunization records indicate need for vaccine: Yes, confirmed with Epic  Minimum interval has been met for this vaccine: Yes  ABN completed: Not Indicated  VIS Dated 2021 was given to patient: Yes  All IAC Questionnaire questions were answered \"No.\"    Patient tolerated injection and no adverse effects were observed or reported: Yes    Pt scheduled for next dose in series: No   "
no indicators present

## 2022-10-28 ENCOUNTER — HOSPITAL ENCOUNTER (OUTPATIENT)
Dept: CARDIOLOGY | Facility: MEDICAL CENTER | Age: 77
End: 2022-10-28
Attending: INTERNAL MEDICINE
Payer: MEDICARE

## 2022-10-28 DIAGNOSIS — I25.10 CORONARY ARTERY DISEASE INVOLVING NATIVE CORONARY ARTERY OF NATIVE HEART WITHOUT ANGINA PECTORIS: ICD-10-CM

## 2022-10-28 DIAGNOSIS — Z95.1 S/P CABG (CORONARY ARTERY BYPASS GRAFT): ICD-10-CM

## 2022-10-28 DIAGNOSIS — I10 ESSENTIAL HYPERTENSION: ICD-10-CM

## 2022-10-28 PROCEDURE — 93306 TTE W/DOPPLER COMPLETE: CPT

## 2022-10-29 LAB
LV EJECT FRACT  99904: 55
LV EJECT FRACT MOD 2C 99903: 56.52
LV EJECT FRACT MOD 4C 99902: 58.59
LV EJECT FRACT MOD BP 99901: 53.92

## 2022-10-29 PROCEDURE — 93306 TTE W/DOPPLER COMPLETE: CPT | Mod: 26 | Performed by: INTERNAL MEDICINE

## 2022-11-01 ENCOUNTER — HOSPITAL ENCOUNTER (OUTPATIENT)
Dept: RADIOLOGY | Facility: MEDICAL CENTER | Age: 77
End: 2022-11-01
Attending: INTERNAL MEDICINE
Payer: MEDICARE

## 2022-11-01 DIAGNOSIS — I65.23 BILATERAL CAROTID ARTERY STENOSIS: ICD-10-CM

## 2022-11-01 PROCEDURE — 93880 EXTRACRANIAL BILAT STUDY: CPT

## 2022-12-06 ENCOUNTER — OFFICE VISIT (OUTPATIENT)
Dept: MEDICAL GROUP | Facility: MEDICAL CENTER | Age: 77
End: 2022-12-06
Payer: MEDICARE

## 2022-12-06 VITALS
TEMPERATURE: 97.6 F | HEIGHT: 68 IN | HEART RATE: 60 BPM | BODY MASS INDEX: 30.74 KG/M2 | OXYGEN SATURATION: 96 % | RESPIRATION RATE: 16 BRPM | DIASTOLIC BLOOD PRESSURE: 66 MMHG | SYSTOLIC BLOOD PRESSURE: 128 MMHG | WEIGHT: 202.82 LBS

## 2022-12-06 DIAGNOSIS — E89.0 POSTOPERATIVE HYPOTHYROIDISM: ICD-10-CM

## 2022-12-06 DIAGNOSIS — E78.2 MIXED HYPERLIPIDEMIA: ICD-10-CM

## 2022-12-06 DIAGNOSIS — I25.10 CORONARY ARTERY DISEASE INVOLVING NATIVE CORONARY ARTERY OF NATIVE HEART WITHOUT ANGINA PECTORIS: ICD-10-CM

## 2022-12-06 DIAGNOSIS — I10 ESSENTIAL HYPERTENSION: ICD-10-CM

## 2022-12-06 DIAGNOSIS — Z00.00 PREVENTATIVE HEALTH CARE: ICD-10-CM

## 2022-12-06 PROCEDURE — 99214 OFFICE O/P EST MOD 30 MIN: CPT | Performed by: STUDENT IN AN ORGANIZED HEALTH CARE EDUCATION/TRAINING PROGRAM

## 2022-12-06 ASSESSMENT — FIBROSIS 4 INDEX: FIB4 SCORE: 1.07

## 2022-12-06 NOTE — PROGRESS NOTES
Subjective:     CC: chronic conditions follow up    HPI:   Joshua presents today for chronic conditions follow up    Problem   Preventative Health Care    Patient is due for annual labs. Feels well overall.    Health Maintenance: reviewed  Vaccines: flu 09/2022, Tdap 03/2017, PCV completed 09/2017, Shingrix completed 08/2020, Moderna COVID #5 received 11/2022  Colonoscopy due     Postoperative Hypothyroidism    Chronic condition.  Current regimen: levothyroxine 125 mcg daily  Thyroid nodules discovered as an incidental finding on carotid imaging. He is status post total thyroidectomy on 10/23/18.  He completed a whole body iodine scan on 12/13/19 which did not show metastatic thyroid cancer. He follows with endocrinology who are working to adjust his levothyroxine based on hormone levels.     He denies any signs or symptoms or over-treatment or under-treatment at this time.  He follows up with endocrinology in September 2020.  The his most recent lab work demonstrates mild overtreatment but he denies any diarrhea, tremulousness, anxiety, or palpitations.  He plans to wait until he sees Dr. Cardona but suspects that he will need to go down on his levothyroxine dose.     Cad (Coronary Artery Disease)    Chronic condition. Followed by cardiology Dr. Garcia annually.  Status post cardiac triple bypass surgery with left internal mammary artery graft to left anterior descending artery, saphenous vein graft to diagonal branch, saphenous vein graft to distal obtuse marginal branch by Dr. Richardson (12/15/14).  Current regimen: Plavix 75mg daily, atorvastatin 20mg daily, losartan 100mg daily, metoprolol tartrate 25mg BID  Denies chest pain, SOB, palpitation, leg edema     Mixed Hyperlipidemia    Chronic condition.  Current regimen: atorvastatin 20mg daily  He has a history of CABG, no history of cerebrovascular disease. He has carotid disease and hypertension. No side effects from medication like myalgias or GI upset.       Essential Hypertension    Chronic condition. He has been on blood pressure medication since his early 60's.    Current regimen: Losartan 100 mg daily, metoprolol tartrate 25 mg twice daily  Patient tolerating and reports compliant with medications. He does not regularly monitor blood pressure at home. Does not need refill of medications today. Denies headache, dizziness, vision changes, chest pain, dyspnea, edema.    Stress test 2018:  Myocardial Perfusion   Report   NUCLEAR IMAGING INTERPRETATION   No evidence of significant jeopardized viable myocardium or prior myocardial infarction. Normal left ventricular size, ejection fraction, and wall motion. Nondiagnostic stress test.   ECG INTERPRETATION   Negative stress ECG for ischemia.   ECG   Resting ECG: Sinus rhythm. Mild nonspecific ST depression.   Stress ECG: Further ST depression with Regadenoson. This did not meet criteria for ischemia due to resting ECG abnormality.         Current Outpatient Medications Ordered in Epic   Medication Sig Dispense Refill    atorvastatin (LIPITOR) 20 MG Tab Take 1 Tablet by mouth every day. 100 Tablet 3    losartan (COZAAR) 100 MG Tab TAKE 1 TABLET BY MOUTH EVERY  Tablet 3    metoprolol tartrate (LOPRESSOR) 25 MG Tab TAKE 1 TABLET BY MOUTH 2 TIMES A DAY. TAKE 1 TABLET BY MOUTH TWICE A  Tablet 3    levothyroxine (SYNTHROID) 125 MCG Tab TAKE 1 TABLET BY MOUTH EVERY DAY IN THE MORNING ON AN EMPTY STOMACH 90 Tablet 3    clopidogrel (PLAVIX) 75 MG Tab Take 1 Tablet by mouth every day. 100 Tablet 3    Multiple Vitamins-Minerals (MULTIVITAMIN ADULT PO) Take 1 Tab by mouth every day.      Calcium 600 MG Tab Take 1 Tab by mouth every day.       No current [facility]-ordered facility-administered medications on file.     Social history  Living situation: lives with wife at home  Occupation: works as   Marital status:   Alcohol/tobacco/illicit drugs: never smoker, denies EtOH or illicit drugs  Baseline functional  "status: independent with ADLs     Health Maintenance: reviewed  Vaccines: flu 09/2022, Tdap 03/2017, PCV completed 09/2017, Shingrix completed 08/2020, Moderna COVID #5 received 11/2022  Colonoscopy due     ROS:   Gen: no fevers/chills, no changes in weight  Eyes: no changes in vision  ENT: no sore throat  Pulm: no sob, no cough  CV: no chest pain, no palpitations  GI: no nausea/vomiting, no diarrhea  : no dysuria  MSk: no myalgias  Skin: no rash  Neuro: no headaches, no numbness/tingling    Objective:     Exam:  /66 (BP Location: Left arm, Patient Position: Sitting, BP Cuff Size: Adult)   Pulse 60   Temp 36.4 °C (97.6 °F) (Temporal)   Resp 16   Ht 1.727 m (5' 8\")   Wt 92 kg (202 lb 13.2 oz)   SpO2 96%   BMI 30.84 kg/m²  Body mass index is 30.84 kg/m².    General: Normal appearing. No distress.  HEENT: Normocephalic. Nasal mucosa benign, oropharynx is without erythema, edema or exudates.   Neck: Supple without JVD or bruit. Thyroid is not enlarged.  Pulmonary: Clear to ausculation. Normal effort. No rales, ronchi, or wheezing.  Cardiovascular: Regular rate and rhythm without murmur. Carotid and radial pulses are intact and equal bilaterally.  Abdomen: Soft, nontender, nondistended. Normal bowel sounds.  Neurologic: Grossly nonfocal  Skin: Warm and dry. No obvious lesions.  Musculoskeletal: Normal gait. No extremity cyanosis, clubbing, or edema.  Psych: Normal mood and affect. Alert and oriented x3. Judgment and insight is normal.    Labs:   Lab Results   Component Value Date/Time    WBC 5.7 05/26/2022 07:57 AM    RBC 4.62 (L) 05/26/2022 07:57 AM    HEMOGLOBIN 15.8 05/26/2022 07:57 AM    HEMATOCRIT 45.8 05/26/2022 07:57 AM    MCV 99.1 (H) 05/26/2022 07:57 AM    MCH 34.2 (H) 05/26/2022 07:57 AM    MCHC 34.5 05/26/2022 07:57 AM    RDW 43.0 05/26/2022 07:57 AM    PLATELETCT 232 05/26/2022 07:57 AM    MPV 10.9 05/26/2022 07:57 AM      Lab Results   Component Value Date/Time    SODIUM 141 05/26/2022 07:57 " AM    POTASSIUM 4.1 05/26/2022 07:57 AM    CHLORIDE 105 05/26/2022 07:57 AM    CO2 25 05/26/2022 07:57 AM    ANION 11.0 05/26/2022 07:57 AM    GLUCOSE 98 05/26/2022 07:57 AM    BUN 21 05/26/2022 07:57 AM    CREATININE 0.91 05/26/2022 07:57 AM    CALCIUM 8.8 05/26/2022 07:57 AM    ASTSGOT 14 05/26/2022 07:57 AM    ALTSGPT 18 05/26/2022 07:57 AM    TBILIRUBIN 0.5 05/26/2022 07:57 AM    ALBUMIN 4.3 05/26/2022 07:57 AM    TOTPROTEIN 7.0 05/26/2022 07:57 AM    GLOBULIN 2.7 05/26/2022 07:57 AM    AGRATIO 1.6 05/26/2022 07:57 AM     Lab Results   Component Value Date/Time    CHOLSTRLTOT 155 05/26/2022 0757    TRIGLYCERIDE 97 05/26/2022 0757    HDL 50 05/26/2022 0757    LDL 86 05/26/2022 0757     Lab Results   Component Value Date/Time    TSHULTRASEN 1.280 05/26/2022 0757       Assessment & Plan:     77 y.o. male with the following -     1. Essential hypertension  Chronic condition, stable.  - cont current regimen: losartan 100mg daily, metoprolol tartrate 25mg BID  - Comp Metabolic Panel; Future    2. Mixed hyperlipidemia  Chronic condition, stable.  - cont current regimen: atorvastatin 20mg daily  - Lipid Profile; Future  - TSH WITH REFLEX TO FT4; Future    3. Coronary artery disease involving native coronary artery of native heart without angina pectoris  Chronic condition, stable.  - cont current regimen: atorvastatin 20mg daily, Plavix 75mg daily  - Lipid Profile; Future    4. Postoperative hypothyroidism  Chronic condition, stable.  - cont current regimen: levothyroxine 125mcg every morning  - TSH WITH REFLEX TO FT4; Future    5. Preventative health care  - HEP B SURFACE AB; Future  - COLOGUARD COLON CANCER SCREENING    Return in about 6 months (around 6/6/2023) for chronic medical conditions.    Please note that this dictation was created using voice recognition software. I have made every reasonable attempt to correct obvious errors, but I expect that there are errors of grammar and possibly content that I did not  discover before finalizing the note.

## 2022-12-12 SDOH — HEALTH STABILITY: PHYSICAL HEALTH: ON AVERAGE, HOW MANY MINUTES DO YOU ENGAGE IN EXERCISE AT THIS LEVEL?: 10 MIN

## 2022-12-12 SDOH — ECONOMIC STABILITY: INCOME INSECURITY: IN THE LAST 12 MONTHS, WAS THERE A TIME WHEN YOU WERE NOT ABLE TO PAY THE MORTGAGE OR RENT ON TIME?: NO

## 2022-12-12 SDOH — HEALTH STABILITY: PHYSICAL HEALTH: ON AVERAGE, HOW MANY DAYS PER WEEK DO YOU ENGAGE IN MODERATE TO STRENUOUS EXERCISE (LIKE A BRISK WALK)?: 1 DAY

## 2022-12-12 SDOH — ECONOMIC STABILITY: HOUSING INSECURITY: IN THE LAST 12 MONTHS, HOW MANY PLACES HAVE YOU LIVED?: 1

## 2022-12-12 SDOH — ECONOMIC STABILITY: FOOD INSECURITY: WITHIN THE PAST 12 MONTHS, THE FOOD YOU BOUGHT JUST DIDN'T LAST AND YOU DIDN'T HAVE MONEY TO GET MORE.: NEVER TRUE

## 2022-12-12 SDOH — ECONOMIC STABILITY: FOOD INSECURITY: WITHIN THE PAST 12 MONTHS, YOU WORRIED THAT YOUR FOOD WOULD RUN OUT BEFORE YOU GOT MONEY TO BUY MORE.: NEVER TRUE

## 2022-12-12 ASSESSMENT — SOCIAL DETERMINANTS OF HEALTH (SDOH)
HOW OFTEN DO YOU HAVE A DRINK CONTAINING ALCOHOL: NEVER
DO YOU BELONG TO ANY CLUBS OR ORGANIZATIONS SUCH AS CHURCH GROUPS UNIONS, FRATERNAL OR ATHLETIC GROUPS, OR SCHOOL GROUPS?: NO
IN A TYPICAL WEEK, HOW MANY TIMES DO YOU TALK ON THE PHONE WITH FAMILY, FRIENDS, OR NEIGHBORS?: THREE TIMES A WEEK
DO YOU BELONG TO ANY CLUBS OR ORGANIZATIONS SUCH AS CHURCH GROUPS UNIONS, FRATERNAL OR ATHLETIC GROUPS, OR SCHOOL GROUPS?: NO
HOW OFTEN DO YOU ATTENT MEETINGS OF THE CLUB OR ORGANIZATION YOU BELONG TO?: NEVER
HOW OFTEN DO YOU ATTEND CHURCH OR RELIGIOUS SERVICES?: MORE THAN 4 TIMES PER YEAR
HOW OFTEN DO YOU ATTEND CHURCH OR RELIGIOUS SERVICES?: MORE THAN 4 TIMES PER YEAR
HOW OFTEN DO YOU GET TOGETHER WITH FRIENDS OR RELATIVES?: ONCE A WEEK
HOW MANY DRINKS CONTAINING ALCOHOL DO YOU HAVE ON A TYPICAL DAY WHEN YOU ARE DRINKING: PATIENT DOES NOT DRINK
HOW OFTEN DO YOU GET TOGETHER WITH FRIENDS OR RELATIVES?: ONCE A WEEK
WITHIN THE PAST 12 MONTHS, YOU WORRIED THAT YOUR FOOD WOULD RUN OUT BEFORE YOU GOT THE MONEY TO BUY MORE: NEVER TRUE
IN A TYPICAL WEEK, HOW MANY TIMES DO YOU TALK ON THE PHONE WITH FAMILY, FRIENDS, OR NEIGHBORS?: THREE TIMES A WEEK
HOW OFTEN DO YOU HAVE SIX OR MORE DRINKS ON ONE OCCASION: NEVER
HOW OFTEN DO YOU ATTENT MEETINGS OF THE CLUB OR ORGANIZATION YOU BELONG TO?: NEVER

## 2022-12-12 ASSESSMENT — LIFESTYLE VARIABLES
HOW OFTEN DO YOU HAVE SIX OR MORE DRINKS ON ONE OCCASION: NEVER
SKIP TO QUESTIONS 9-10: 1
HOW OFTEN DO YOU HAVE A DRINK CONTAINING ALCOHOL: NEVER
HOW MANY STANDARD DRINKS CONTAINING ALCOHOL DO YOU HAVE ON A TYPICAL DAY: PATIENT DOES NOT DRINK
AUDIT-C TOTAL SCORE: 0

## 2022-12-13 ENCOUNTER — OFFICE VISIT (OUTPATIENT)
Dept: MEDICAL GROUP | Facility: MEDICAL CENTER | Age: 77
End: 2022-12-13
Payer: MEDICARE

## 2022-12-13 VITALS
RESPIRATION RATE: 16 BRPM | TEMPERATURE: 98.2 F | SYSTOLIC BLOOD PRESSURE: 118 MMHG | WEIGHT: 202 LBS | DIASTOLIC BLOOD PRESSURE: 60 MMHG | HEIGHT: 68 IN | HEART RATE: 74 BPM | OXYGEN SATURATION: 97 % | BODY MASS INDEX: 30.62 KG/M2

## 2022-12-13 DIAGNOSIS — Z00.00 MEDICARE ANNUAL WELLNESS VISIT, SUBSEQUENT: ICD-10-CM

## 2022-12-13 PROCEDURE — G0439 PPPS, SUBSEQ VISIT: HCPCS | Performed by: STUDENT IN AN ORGANIZED HEALTH CARE EDUCATION/TRAINING PROGRAM

## 2022-12-13 ASSESSMENT — ENCOUNTER SYMPTOMS: GENERAL WELL-BEING: GOOD

## 2022-12-13 ASSESSMENT — ACTIVITIES OF DAILY LIVING (ADL): BATHING_REQUIRES_ASSISTANCE: 0

## 2022-12-13 ASSESSMENT — PATIENT HEALTH QUESTIONNAIRE - PHQ9: CLINICAL INTERPRETATION OF PHQ2 SCORE: 0

## 2022-12-13 ASSESSMENT — FIBROSIS 4 INDEX: FIB4 SCORE: 1.07

## 2022-12-13 NOTE — PROGRESS NOTES
Chief Complaint   Patient presents with    Annual Exam       HPI:  Joshua Becerra is a 77 y.o. here for Medicare Annual Wellness Visit     Problem   Medicare Annual Wellness Visit, Subsequent    Patient is here for Medicare annual wellness visit today.       Social history  Living situation: lives with wife at home  Occupation: works as   Marital status:   Alcohol/tobacco/illicit drugs: never smoker, denies EtOH or illicit drugs  Baseline functional status: independent with ADLs    Patient Active Problem List    Diagnosis Date Noted    Medicare annual wellness visit, subsequent 12/13/2022    COVID-19 08/17/2022    Preventative health care 06/02/2022    BMI 30.0-30.9,adult 06/02/2022    Glaucoma (increased eye pressure) 06/02/2022    Chronic gout 06/24/2021    Chronic pain of right knee 01/19/2021    Macrocytosis without anemia 01/19/2021    Chronic pain of both knees 01/26/2020    Neck pain 01/26/2020    Impaired fasting blood sugar 01/26/2020    Postoperative hypothyroidism 07/23/2019    History of papillary adenocarcinoma of thyroid 09/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    Arthritis of right knee 09/16/2016    Bilateral low back pain without sciatica 09/16/2016    Nocturnal enuresis 10/28/2015    Other and combined forms of senile cataract 06/04/2015    Nonrheumatic aortic valve stenosis 02/10/2015    S/P CABG (coronary artery bypass graft) 12/15/2014    CAD (coronary artery disease) 12/14/2014    Carotid artery stenosis 12/14/2014    Mixed hyperlipidemia 12/12/2014    Essential hypertension 12/01/2014    H/O prostatectomy 09/03/2013    History of prostate cancer 09/03/2013       Current Outpatient Medications   Medication Sig Dispense Refill    atorvastatin (LIPITOR) 20 MG Tab Take 1 Tablet by mouth every day. 100 Tablet 3    losartan (COZAAR) 100 MG Tab TAKE 1 TABLET BY MOUTH EVERY  Tablet 3    metoprolol tartrate (LOPRESSOR) 25 MG Tab TAKE 1 TABLET BY MOUTH 2 TIMES A DAY. TAKE 1 TABLET BY MOUTH TWICE A   Tablet 3    levothyroxine (SYNTHROID) 125 MCG Tab TAKE 1 TABLET BY MOUTH EVERY DAY IN THE MORNING ON AN EMPTY STOMACH 90 Tablet 3    clopidogrel (PLAVIX) 75 MG Tab Take 1 Tablet by mouth every day. 100 Tablet 3    Multiple Vitamins-Minerals (MULTIVITAMIN ADULT PO) Take 1 Tab by mouth every day.      Calcium 600 MG Tab Take 1 Tab by mouth every day.       No current facility-administered medications for this visit.          Current supplements as per medication list.     Allergies: Patient has no known allergies.    Current social contact/activities: Druze events at time, has 3 children (one lives in [place]), sees him once every couple months      He  reports that he has never smoked. He has never used smokeless tobacco. He reports that he does not drink alcohol and does not use drugs.  Counseling given: Not Answered  Tobacco comments: continued abstinence      ROS:    Gait: Uses no assistive device  Ostomy: No  Other tubes: No  Amputations: No  Chronic oxygen use: No  Last eye exam: January 2021  Wears hearing aids: No   : Reports urinary leakage during the last 6 months that has somewhat interfered with their daily activities or sleep.    Screening:    Depression Screening  Little interest or pleasure in doing things?  0 - not at all  Feeling down, depressed , or hopeless? 0 - not at all  Patient Health Questionnaire Score: 0     If depressive symptoms identified deferred to follow up visit unless specifically addressed in assessment and plan.    Interpretation of PHQ-9 Total Score   Score Severity   1-4 No Depression   5-9 Mild Depression   10-14 Moderate Depression   15-19 Moderately Severe Depression   20-27 Severe Depression    Screening for Cognitive Impairment  Three Minute Recall (daughter, heaven, mountain) 3/3    Mk clock face with all 12 numbers and set the hands to show 10 past 11.  Yes    Cognitive concerns identified deferred for follow up unless specifically addressed in assessment and  plan.    Fall Risk Assessment  Has the patient had two or more falls in the last year or any fall with injury in the last year?  No    Safety Assessment  Throw rugs on floor.  Yes  Handrails on all stairs.  No  Good lighting in all hallways.  Yes  Difficulty hearing.  No  Patient counseled about all safety risks that were identified.    Functional Assessment ADLs  Are there any barriers preventing you from cooking for yourself or meeting nutritional needs?  No.    Are there any barriers preventing you from driving safely or obtaining transportation?  No.    Are there any barriers preventing you from using a telephone or calling for help?  No.    Are there any barriers preventing you from shopping?  No.    Are there any barriers preventing you from taking care of your own finances?  No.    Are there any barriers preventing you from managing your medications?  No.    Are there any barriers preventing you from showering, bathing or dressing yourself?  No.    Are you currently engaging in any exercise or physical activity?  Yes.     What is your perception of your health?  Good    Advance Care Planning  Do you have an Advance Directive, Living Will, Durable Power of , or POLST? No                 Health Maintenance Summary            Overdue - IMM HEP B VACCINE (1 of 3 - 3-dose series) Overdue - never done      No completion history exists for this topic.              Ordered - COLORECTAL CANCER SCREENING (COLONOSCOPY - Every 5 Years) Ordered on 12/6/2022 08/02/2005  REFERRAL TO GI FOR COLONOSCOPY              Annual Wellness Visit (Every 366 Days) Next due on 12/14/2023 12/13/2022  Prob Dx: Medicare annual wellness visit, subsequent    12/13/2022  Visit Dx: Medicare annual wellness visit, subsequent              IMM DTaP/Tdap/Td Vaccine (2 - Td or Tdap) Next due on 3/13/2027      03/13/2017  Imm Admin: Tdap Vaccine              IMM PNEUMOCOCCAL VACCINE: 65+ Years (Series Information) Completed       09/18/2017  Imm Admin: Pneumococcal Conjugate Vaccine (Prevnar/PCV-13)    12/01/2014  Imm Admin: Pneumococcal polysaccharide vaccine (PPSV-23)              HEPATITIS C SCREENING  Completed      12/30/2019  HEP C VIRUS ANTIBODY              IMM ZOSTER VACCINES (Series Information) Completed      08/04/2020  Imm Admin: Zoster Vaccine Recombinant (RZV) (SHINGRIX)    01/23/2020  Imm Admin: Zoster Vaccine Recombinant (RZV) (SHINGRIX)    11/25/2015  Imm Admin: Zoster Vaccine Live (ZVL) (Zostavax) - HISTORICAL DATA              IMM INFLUENZA (Series Information) Completed      09/20/2022  Imm Admin: Influenza Vaccine Adult HD    10/21/2021  Imm Admin: Influenza Vaccine Adult HD    09/18/2020  Imm Admin: Influenza Vaccine Quad Inj (Pf)    10/01/2019  Imm Admin: Influenza Vaccine Adult HD    09/24/2018  Imm Admin: Influenza Vaccine Adult HD    Only the first 5 history entries have been loaded, but more history exists.              COVID-19 Vaccine (Series Information) Completed      11/18/2022  Imm Admin: MODERNA BIVALENT BOOSTER SARS-COV-2 VACCINE (6+)    06/03/2022  Imm Admin: MODERNA SARS-COV-2 VACCINE (12+)    10/28/2021  Imm Admin: MODERNA SARS-COV-2 VACCINE (12+)    02/04/2021  Imm Admin: MODERNA SARS-COV-2 VACCINE (12+)    01/07/2021  Imm Admin: MODERNA SARS-COV-2 VACCINE (12+)              IMM MENINGOCOCCAL ACWY VACCINE (Series Information) Aged Out      No completion history exists for this topic.                    Patient Care Team:  Ish Cornejo D.O. as PCP - General (Family Medicine)  Libra Washington M.D. as PCP - [facility] Paneled  Nahun Garcia M.D. as Consulting Physician (Cardiovascular Disease (Cardiology))  Andrea Alamo M.D. as Consulting Physician (Surgery)  Angel Adams M.D. as Consulting Physician (Otolaryngology)  Jessy Diaz (Inactive)        Social History     Tobacco Use    Smoking status: Never    Smokeless tobacco: Never    Tobacco comments:     continued abstinence   Vaping Use    Vaping Use:  "Never used   Substance Use Topics    Alcohol use: No     Alcohol/week: 0.0 oz    Drug use: No     Family History   Problem Relation Age of Onset    Cancer Sister         colon    Cancer Brother         prostate    Heart Disease Mother     Arthritis Mother     Hypertension Mother     Hyperlipidemia Mother     Stroke Mother     Heart Disease Father     Hypertension Father     Hyperlipidemia Father     Stroke Father     Arthritis Brother     Hyperlipidemia Brother     Diabetes Neg Hx      He  has a past medical history of CAD (coronary artery disease), Cancer (HCC), Carotid artery disease (HCC), Disorder of thyroid (10/18/2018), High cholesterol, Hyperlipidemia, Hypertension, Unspecified cataract, Unspecified urinary incontinence (10/18/2018), and Valvular heart disease.    He has no past medical history of Encounter for long-term (current) use of other medications.   Past Surgical History:   Procedure Laterality Date    THYROIDECTOMY TOTAL  10/23/2018    Procedure: THYROIDECTOMY TOTAL;  Surgeon: Angel Adams M.D.;  Location: [facility];  Service: Ent    NECK DISSECTION  10/23/2018    Procedure: NECK DISSECTION - POSSIBLE LIMITED CENTRAL;  Surgeon: Angel Adams M.D.;  Location: [facility];  Service: Ent    CATARACT PHACO WITH IOL Left 7/2/2015    Procedure: CATARACT PHACO WITH IOL;  Surgeon: Matty Augustin M.D.;  Location: [facility];  Service:     CATARACT PHACO WITH IOL Right 6/4/2015    Procedure: CATARACT PHACO WITH IOL;  Surgeon: Matty Augustin M.D.;  Location: [facility];  Service:     MULTIPLE CORONARY ARTERY BYPASS ENDO VEIN HARVEST  12/15/2014    Performed by Gustavo Richardson M.D. at [facility]    PROSTATECTOMY, RADICAL RETRO  12/17/2008       Exam:   /60 (BP Location: Left arm, Patient Position: Sitting, BP Cuff Size: Adult)   Pulse 74   Temp 36.8 °C (98.2 °F) (Temporal)   Resp 16   Ht 1.727 m (5' 8\")   Wt 91.6 kg (202 " lb)   SpO2 97%  Body mass index is 30.71 kg/m².    Hearing excellent.    Dentition good  Alert, oriented in no acute distress.  Eye contact is good, speech goal directed, affect calm    Assessment and Plan. The following treatment and monitoring plan is recommended:    1. Medicare annual wellness visit, subsequent  - Recommended age appropriate vaccinations and cancer screening  - Labs previously ordered.  - Vaccinations up to date.  - Counseling about diet, supplements, exercise, skin care.  - Follow up 1 year for annual wellness    Services suggested: No services needed at this time  Health Care Screening: Age-appropriate preventive services recommended by USPSTF and ACIP covered by Medicare were discussed today. Services ordered if indicated and agreed upon by the patient.  Referrals offered: Community-based lifestyle interventions to reduce health risks and promote self-management and wellness, fall prevention, nutrition, physical activity, tobacco-use cessation, weight loss, and mental health services as per orders if indicated.    Discussion today about general wellness and lifestyle habits:    Prevent falls and reduce trip hazards; Cautioned about securing or removing rugs.  Have a working fire alarm and carbon monoxide detector;   Engage in regular physical activity and social activities     Follow-up: Return in about 1 year (around 12/13/2023) for AWV.

## 2023-03-01 DIAGNOSIS — I65.23 BILATERAL CAROTID ARTERY STENOSIS: ICD-10-CM

## 2023-03-01 DIAGNOSIS — E78.2 MIXED HYPERLIPIDEMIA: ICD-10-CM

## 2023-03-01 DIAGNOSIS — I25.10 CORONARY ARTERY DISEASE INVOLVING NATIVE CORONARY ARTERY OF NATIVE HEART WITHOUT ANGINA PECTORIS: ICD-10-CM

## 2023-03-03 RX ORDER — CLOPIDOGREL BISULFATE 75 MG/1
75 TABLET ORAL
Qty: 100 TABLET | Refills: 1 | Status: SHIPPED | OUTPATIENT
Start: 2023-03-03 | End: 2023-07-27

## 2023-03-03 RX ORDER — ATORVASTATIN CALCIUM 20 MG/1
20 TABLET, FILM COATED ORAL DAILY
Qty: 100 TABLET | Refills: 1 | Status: SHIPPED | OUTPATIENT
Start: 2023-03-03 | End: 2023-12-12

## 2023-03-03 NOTE — TELEPHONE ENCOUNTER
Is the patient due for a refill? Yes- pharmacy change    Was the patient seen the past year? Yes    Date of last office visit: 9/13/2022    Does the patient have an upcoming appointment?  No   If yes, When?     Provider to refill:CHERY    Does the patients insurance require a 100 day supply?  Yes

## 2023-03-03 NOTE — TELEPHONE ENCOUNTER
Is the patient due for a refill? Yes    Was the patient seen the past year? Yes    Date of last office visit: 9/13/2022    Does the patient have an upcoming appointment?  No   If yes, When?     Provider to refill:CHERY    Does the patients insurance require a 100 day supply?  Yes

## 2023-03-07 ENCOUNTER — TELEPHONE (OUTPATIENT)
Dept: CARDIOLOGY | Facility: MEDICAL CENTER | Age: 78
End: 2023-03-07
Payer: MEDICARE

## 2023-03-08 ENCOUNTER — PATIENT MESSAGE (OUTPATIENT)
Dept: CARDIOLOGY | Facility: MEDICAL CENTER | Age: 78
End: 2023-03-08
Payer: MEDICARE

## 2023-03-09 DIAGNOSIS — I65.23 BILATERAL CAROTID ARTERY STENOSIS: ICD-10-CM

## 2023-03-09 RX ORDER — CLOPIDOGREL BISULFATE 75 MG/1
75 TABLET ORAL
Qty: 100 TABLET | Refills: 2 | OUTPATIENT
Start: 2023-03-09

## 2023-03-09 NOTE — TELEPHONE ENCOUNTER
Is the patient due for a refill? Yes    Was the patient seen the past year? Yes    Date of last office visit: 9/13/22    Does the patient have an upcoming appointment?  No     Provider to refill:CHERY    Does the patients insurance require a 100 day supply?  Yes

## 2023-03-13 ENCOUNTER — PATIENT MESSAGE (OUTPATIENT)
Dept: MEDICAL GROUP | Facility: MEDICAL CENTER | Age: 78
End: 2023-03-13
Payer: MEDICARE

## 2023-03-13 DIAGNOSIS — I10 ESSENTIAL HYPERTENSION: ICD-10-CM

## 2023-03-14 NOTE — PATIENT COMMUNICATION
Received request via: Patient    Was the patient seen in the last year in this department? Yes    Does the patient have an active prescription (recently filled or refills available) for medication(s) requested? No    Does the patient have [facility] and need 100 day supply (blood pressure, diabetes and cholesterol meds only)? Yes, quantity updated to 100 days

## 2023-05-04 ENCOUNTER — PATIENT MESSAGE (OUTPATIENT)
Dept: MEDICAL GROUP | Facility: MEDICAL CENTER | Age: 78
End: 2023-05-04
Payer: MEDICARE

## 2023-05-04 DIAGNOSIS — E89.0 POSTOPERATIVE HYPOTHYROIDISM: ICD-10-CM

## 2023-05-04 RX ORDER — LEVOTHYROXINE SODIUM 0.12 MG/1
125 TABLET ORAL
Qty: 90 TABLET | Refills: 3 | Status: SHIPPED | OUTPATIENT
Start: 2023-05-04

## 2023-05-04 NOTE — PATIENT COMMUNICATION
Received request via: Patient    Was the patient seen in the last year in this department? Yes    Does the patient have an active prescription (recently filled or refills available) for medication(s) requested? No    Does the patient have [facility] and need 100 day supply (blood pressure, diabetes and cholesterol meds only)? Medication is not for cholesterol, blood pressure or diabetes

## 2023-06-13 ENCOUNTER — OFFICE VISIT (OUTPATIENT)
Dept: MEDICAL GROUP | Facility: MEDICAL CENTER | Age: 78
End: 2023-06-13
Payer: MEDICARE

## 2023-06-13 VITALS
SYSTOLIC BLOOD PRESSURE: 120 MMHG | HEART RATE: 79 BPM | HEIGHT: 68 IN | WEIGHT: 200.4 LBS | BODY MASS INDEX: 30.37 KG/M2 | OXYGEN SATURATION: 96 % | DIASTOLIC BLOOD PRESSURE: 78 MMHG | TEMPERATURE: 97.4 F

## 2023-06-13 DIAGNOSIS — E78.2 MIXED HYPERLIPIDEMIA: ICD-10-CM

## 2023-06-13 DIAGNOSIS — I25.10 CORONARY ARTERY DISEASE INVOLVING NATIVE CORONARY ARTERY OF NATIVE HEART WITHOUT ANGINA PECTORIS: ICD-10-CM

## 2023-06-13 DIAGNOSIS — I10 ESSENTIAL HYPERTENSION: ICD-10-CM

## 2023-06-13 DIAGNOSIS — E89.0 POSTOPERATIVE HYPOTHYROIDISM: ICD-10-CM

## 2023-06-13 PROCEDURE — 3078F DIAST BP <80 MM HG: CPT | Performed by: STUDENT IN AN ORGANIZED HEALTH CARE EDUCATION/TRAINING PROGRAM

## 2023-06-13 PROCEDURE — 99214 OFFICE O/P EST MOD 30 MIN: CPT | Performed by: STUDENT IN AN ORGANIZED HEALTH CARE EDUCATION/TRAINING PROGRAM

## 2023-06-13 PROCEDURE — 3074F SYST BP LT 130 MM HG: CPT | Performed by: STUDENT IN AN ORGANIZED HEALTH CARE EDUCATION/TRAINING PROGRAM

## 2023-06-13 RX ORDER — LATANOPROST 50 UG/ML
SOLUTION/ DROPS OPHTHALMIC
COMMUNITY
Start: 2023-05-24

## 2023-06-13 ASSESSMENT — FIBROSIS 4 INDEX: FIB4 SCORE: 1.08

## 2023-06-13 ASSESSMENT — PATIENT HEALTH QUESTIONNAIRE - PHQ9: CLINICAL INTERPRETATION OF PHQ2 SCORE: 0

## 2023-06-13 NOTE — PROGRESS NOTES
Subjective:     CC: chronic conditions follow up    HPI:   Joshua presents today for chronic conditions follow up    Problem   Bmi 30.0-30.9,Adult    Chronic condition. He tries to eat healthy in general. He does not regularly exercise.     Postoperative Hypothyroidism    Chronic condition.  Current regimen: levothyroxine 125 mcg daily  Thyroid nodules discovered as an incidental finding on carotid imaging. He is status post total thyroidectomy on 10/23/18.  He completed a whole body iodine scan on 12/13/19 which did not show metastatic thyroid cancer. He follows with endocrinology who are working to adjust his levothyroxine based on hormone levels.     He denies any signs or symptoms or over-treatment or under-treatment at this time.  He follows up with endocrinology in September 2020.  The his most recent lab work demonstrates mild overtreatment but he denies any diarrhea, tremulousness, anxiety, or palpitations.  He plans to wait until he sees Dr. Cardona but suspects that he will need to go down on his levothyroxine dose.     Cad (Coronary Artery Disease)    Chronic condition. Followed by cardiology Dr. Garcia annually.  Status post cardiac triple bypass surgery with left internal mammary artery graft to left anterior descending artery, saphenous vein graft to diagonal branch, saphenous vein graft to distal obtuse marginal branch by Dr. Richardson (12/15/14).  Current regimen: Plavix 75mg daily, atorvastatin 20mg daily, losartan 100mg daily, metoprolol tartrate 25mg BID  Denies chest pain, SOB, palpitation, leg edema     Mixed Hyperlipidemia    Chronic condition.  Current regimen: atorvastatin 20mg daily  He has a history of CABG, no history of cerebrovascular disease. He has carotid disease and hypertension. No side effects from medication like myalgias or GI upset.      Essential Hypertension    Chronic condition. He has been on blood pressure medication since his early 60's.    Current regimen: Losartan 100 mg  daily, metoprolol tartrate 25 mg twice daily  Patient tolerating and reports compliant with medications. He does not regularly monitor blood pressure at home. Does not need refill of medications today. Denies headache, dizziness, vision changes, chest pain, dyspnea, edema.    Stress test 2018:  Myocardial Perfusion   Report   NUCLEAR IMAGING INTERPRETATION   No evidence of significant jeopardized viable myocardium or prior myocardial infarction. Normal left ventricular size, ejection fraction, and wall motion. Nondiagnostic stress test.   ECG INTERPRETATION   Negative stress ECG for ischemia.   ECG   Resting ECG: Sinus rhythm. Mild nonspecific ST depression.   Stress ECG: Further ST depression with Regadenoson. This did not meet criteria for ischemia due to resting ECG abnormality.         Current Outpatient Medications Ordered in Epic   Medication Sig Dispense Refill    levothyroxine (SYNTHROID) 125 MCG Tab Take 1 Tablet by mouth every morning on an empty stomach. 90 Tablet 3    metoprolol tartrate (LOPRESSOR) 25 MG Tab Take 1 Tablet by mouth 2 times a day. TAKE 1 TABLET BY MOUTH TWICE A  Tablet 3    latanoprost (XALATAN) 0.005 % Solution INSTILL 1 DROP BOTH EYES EVERY MORNING      clopidogrel (PLAVIX) 75 MG Tab Take 1 Tablet by mouth every day. 100 Tablet 1    atorvastatin (LIPITOR) 20 MG Tab Take 1 Tablet by mouth every day. 100 Tablet 1    losartan (COZAAR) 100 MG Tab TAKE 1 TABLET BY MOUTH EVERY  Tablet 3    Multiple Vitamins-Minerals (MULTIVITAMIN ADULT PO) Take 1 Tab by mouth every day.      Calcium 600 MG Tab Take 1 Tab by mouth every day.       No current [facility]-ordered facility-administered medications on file.     Social history  Living situation: lives with wife at home  Occupation: works as   Marital status:   Alcohol/tobacco/illicit drugs: never smoker, denies EtOH or illicit drugs  Baseline functional status: independent with ADLs    ROS:  See HPI    Objective:  "    Exam:  /78 (BP Location: Left arm, Patient Position: Sitting, BP Cuff Size: Adult)   Pulse 79   Temp 36.3 °C (97.4 °F) (Temporal)   Ht 1.727 m (5' 8\")   Wt 90.9 kg (200 lb 6.4 oz)   SpO2 96%   BMI 30.47 kg/m²  Body mass index is 30.47 kg/m².    Gen: Alert and oriented, No apparent distress.  Neck: Neck is supple without lymphadenopathy.  Lungs: Normal effort, CTA bilaterally, no wheezes, rhonchi, or rales  CV: Regular rate and rhythm. No murmurs, rubs, or gallops.  Ext: No clubbing, cyanosis, edema.    Labs: no new lab results since last visit    Assessment & Plan:     78 y.o. male with the following -     1. Essential hypertension  Chronic condition, stable.  - cont current regimen: losartan 100mg daily, metoprolol tartrate 25mg BID    2. Coronary artery disease involving native coronary artery of native heart without angina pectoris  Chronic condition, stable.  - cont current regimen: atorvastatin 20mg daily, Plavix 75mg daily    3. Mixed hyperlipidemia  Chronic condition, stable.  - cont current regimen: atorvastatin 20mg daily    4. Postoperative hypothyroidism  Chronic condition, stable.  - cont current regimen: levothyroxine 125mcg every morning    5. BMI 30.0-30.9,adult  - Advised healthy diet/weight loss, regular exercise    Return in about 6 months (around 12/13/2023).    Please note that this dictation was created using voice recognition software. I have made every reasonable attempt to correct obvious errors, but I expect that there are errors of grammar and possibly content that I did not discover before finalizing the note.        "

## 2023-06-30 ENCOUNTER — PATIENT MESSAGE (OUTPATIENT)
Dept: MEDICAL GROUP | Facility: MEDICAL CENTER | Age: 78
End: 2023-06-30
Payer: MEDICARE

## 2023-06-30 DIAGNOSIS — I10 ESSENTIAL HYPERTENSION: ICD-10-CM

## 2023-06-30 RX ORDER — LOSARTAN POTASSIUM 100 MG/1
100 TABLET ORAL
Qty: 100 TABLET | Refills: 3 | Status: SHIPPED | OUTPATIENT
Start: 2023-06-30

## 2023-07-05 ENCOUNTER — TELEPHONE (OUTPATIENT)
Dept: HEALTH INFORMATION MANAGEMENT | Facility: OTHER | Age: 78
End: 2023-07-05

## 2023-07-27 DIAGNOSIS — I65.23 BILATERAL CAROTID ARTERY STENOSIS: ICD-10-CM

## 2023-07-27 RX ORDER — CLOPIDOGREL BISULFATE 75 MG/1
75 TABLET ORAL
Qty: 100 TABLET | Refills: 0 | Status: SHIPPED | OUTPATIENT
Start: 2023-07-27 | End: 2023-11-21

## 2023-07-27 NOTE — TELEPHONE ENCOUNTER
Is the patient due for a refill? Yes- original not sent electronically     Was the patient seen the past year? Yes    Date of last office visit: 9/13/2022    Does the patient have an upcoming appointment?  No   If yes, When?     Provider to refill:CHERY    Does the patients insurance require a 100 day supply?  Yes

## 2023-09-11 ENCOUNTER — PATIENT MESSAGE (OUTPATIENT)
Dept: HEALTH INFORMATION MANAGEMENT | Facility: OTHER | Age: 78
End: 2023-09-11

## 2023-09-11 ENCOUNTER — DOCUMENTATION (OUTPATIENT)
Dept: HEALTH INFORMATION MANAGEMENT | Facility: OTHER | Age: 78
End: 2023-09-11
Payer: MEDICARE

## 2023-09-19 ENCOUNTER — OFFICE VISIT (OUTPATIENT)
Dept: URGENT CARE | Facility: PHYSICIAN GROUP | Age: 78
End: 2023-09-19
Payer: MEDICARE

## 2023-09-19 VITALS
BODY MASS INDEX: 30.31 KG/M2 | TEMPERATURE: 97.4 F | DIASTOLIC BLOOD PRESSURE: 70 MMHG | WEIGHT: 200 LBS | HEART RATE: 65 BPM | RESPIRATION RATE: 18 BRPM | SYSTOLIC BLOOD PRESSURE: 130 MMHG | HEIGHT: 68 IN | OXYGEN SATURATION: 96 %

## 2023-09-19 DIAGNOSIS — J06.9 URI WITH COUGH AND CONGESTION: ICD-10-CM

## 2023-09-19 LAB
FLUAV RNA SPEC QL NAA+PROBE: NEGATIVE
FLUBV RNA SPEC QL NAA+PROBE: NEGATIVE
RSV RNA SPEC QL NAA+PROBE: NEGATIVE
SARS-COV-2 RNA RESP QL NAA+PROBE: NEGATIVE

## 2023-09-19 PROCEDURE — 99213 OFFICE O/P EST LOW 20 MIN: CPT | Performed by: NURSE PRACTITIONER

## 2023-09-19 PROCEDURE — 0241U POCT CEPHEID COV-2, FLU A/B, RSV - PCR: CPT | Performed by: NURSE PRACTITIONER

## 2023-09-19 PROCEDURE — 3078F DIAST BP <80 MM HG: CPT | Performed by: NURSE PRACTITIONER

## 2023-09-19 PROCEDURE — 3075F SYST BP GE 130 - 139MM HG: CPT | Performed by: NURSE PRACTITIONER

## 2023-09-19 ASSESSMENT — ENCOUNTER SYMPTOMS
HEMOPTYSIS: 0
DIARRHEA: 0
RHINORRHEA: 1
SORE THROAT: 0
SPUTUM PRODUCTION: 0
WHEEZING: 0
VOMITING: 0
COUGH: 1
NAUSEA: 0
FEVER: 1
SHORTNESS OF BREATH: 0

## 2023-09-19 ASSESSMENT — FIBROSIS 4 INDEX: FIB4 SCORE: 1.08

## 2023-09-19 NOTE — PROGRESS NOTES
Subjective:     Joshua Becerra is a 78 y.o. male who presents for Cough (Pt has had a cough for 1 day, took a COVID test and wants to be retested to get an accurate result )      Started on Sunday. Low grade fevers. Robitussin. Had a negative home COVID test. Hx of COVID last year.     Cough  This is a new problem. The current episode started in the past 7 days. Associated symptoms include a fever and rhinorrhea. Pertinent negatives include no hemoptysis, nasal congestion, sore throat, shortness of breath or wheezing.       Past Medical History:   Diagnosis Date    CAD (coronary artery disease)     Cancer (HCC)     Prostate CA- Prostatectomy done    Carotid artery disease (HCC)     Disorder of thyroid 10/18/2018    High cholesterol     Hyperlipidemia     Hypertension     Unspecified cataract     Unspecified urinary incontinence 10/18/2018    Valvular heart disease        Past Surgical History:   Procedure Laterality Date    THYROIDECTOMY TOTAL  10/23/2018    Procedure: THYROIDECTOMY TOTAL;  Surgeon: Angel Adams M.D.;  Location: [facility];  Service: Ent    NECK DISSECTION  10/23/2018    Procedure: NECK DISSECTION - POSSIBLE LIMITED CENTRAL;  Surgeon: Angel Adams M.D.;  Location: [facility];  Service: Ent    CATARACT PHACO WITH IOL Left 7/2/2015    Procedure: CATARACT PHACO WITH IOL;  Surgeon: Matty Augustin M.D.;  Location: [facility];  Service:     CATARACT PHACO WITH IOL Right 6/4/2015    Procedure: CATARACT PHACO WITH IOL;  Surgeon: Matty Augustin M.D.;  Location: [facility];  Service:     MULTIPLE CORONARY ARTERY BYPASS ENDO VEIN HARVEST  12/15/2014    Performed by [place]avo Richardson M.D. at [facility]    PROSTATECTOMY, RADICAL RETRO  12/17/2008       Social History     Socioeconomic History    Marital status:      Spouse name: Not on file    Number of children: Not on file    Years of education: Not on file     Highest education level: Bachelor's degree (e.g., BA, AB, BS)   Occupational History    Not on file   Tobacco Use    Smoking status: Never    Smokeless tobacco: Never    Tobacco comments:     continued abstinence   Vaping Use    Vaping Use: Never used   Substance and Sexual Activity    Alcohol use: No     Alcohol/week: 0.0 oz    Drug use: No    Sexual activity: Not Currently     Partners: Female     Comment: s/p prostatectomy   Other Topics Concern    Not on file   Social History Narrative    Ed was born and raised in the [facility]. He has been in [place] since 1989. He continues to work doing taxes, he is a CPA. He is  to Aleksandra for 50 years, they met in college. They have 3 kids- Larissa (49; [place]), Marina (46; [place]), Atif (39; [place]). They enjoy spending times with their grandkids, 4 in [place] and 2 in [place].      Social Determinants of Health     Financial Resource Strain: Low Risk  (8/23/2021)    Overall Financial Resource Strain (CARDIA)     Difficulty of Paying Living Expenses: Not hard at all   Food Insecurity: No Food Insecurity (12/12/2022)    Hunger Vital Sign     Worried About Running Out of Food in the Last Year: Never true     Ran Out of Food in the Last Year: Never true   Transportation Needs: No Transportation Needs (12/12/2022)    PRAPARE - Transportation     Lack of Transportation (Medical): No     Lack of Transportation (Non-Medical): No   Physical Activity: Insufficiently Active (12/12/2022)    Exercise Vital Sign     Days of Exercise per Week: 1 day     Minutes of Exercise per Session: 10 min   Stress: No Stress Concern Present (12/12/2022)    [place] [employer] - Occupational Stress Questionnaire     Feeling of Stress : Only a little   Social Connections: Moderately Integrated (12/12/2022)    Social Connection and Isolation Panel [NHANES]     Frequency of Communication with Friends and Family: Three times a week     Frequency of Social Gatherings with Friends and  "Family: Once a week     Attends Ca[employer] Services: More than 4 times per year     Active Member of Clubs or Organizations: No     Attends Club or Organization Meetings: Never     Marital Status:    Intimate Partner Violence: Not on file   Housing Stability: Low Risk  (12/12/2022)    Housing Stability Vital Sign     Unable to Pay for Housing in the Last Year: No     Number of Places Lived in the Last Year: 1     Unstable Housing in the Last Year: No        Family History   Problem Relation Age of Onset    Cancer Sister         colon    Cancer Brother         prostate    Heart Disease Mother     Arthritis Mother     Hypertension Mother     Hyperlipidemia Mother     Stroke Mother     Heart Disease Father     Hypertension Father     Hyperlipidemia Father     Stroke Father     Arthritis Brother     Hyperlipidemia Brother     Diabetes Neg Hx         No Known Allergies    Review of Systems   Constitutional:  Positive for fever and malaise/fatigue.   HENT:  Positive for rhinorrhea. Negative for sore throat.    Respiratory:  Positive for cough. Negative for hemoptysis, sputum production, shortness of breath and wheezing.    Gastrointestinal:  Negative for diarrhea, nausea and vomiting.   All other systems reviewed and are negative.       Objective:   /70   Pulse 65   Temp 36.3 °C (97.4 °F) (Temporal)   Resp 18   Ht 1.727 m (5' 8\")   Wt 90.7 kg (200 lb)   SpO2 96%   BMI 30.41 kg/m²     Physical Exam  Vitals reviewed.   Constitutional:       General: He is not in acute distress.     Appearance: He is well-developed. He is not toxic-appearing.   HENT:      Head: Normocephalic and atraumatic.      Right Ear: Ear canal and external ear normal. Tympanic membrane is not erythematous.      Left Ear: Ear canal and external ear normal. Tympanic membrane is not erythematous.      Nose: Mucosal edema and rhinorrhea present.      Mouth/Throat:      Lips: Pink.      Mouth: Mucous membranes are moist.      Pharynx: " Posterior oropharyngeal erythema present.   Eyes:      Conjunctiva/sclera: Conjunctivae normal.   Cardiovascular:      Rate and Rhythm: Normal rate.   Pulmonary:      Effort: Pulmonary effort is normal. No respiratory distress.      Breath sounds: Normal breath sounds. No stridor. No wheezing, rhonchi or rales.   Musculoskeletal:      Cervical back: Neck supple.   Skin:     General: Skin is warm and dry.      Findings: No rash.   Neurological:      Mental Status: He is alert and oriented to person, place, and time.      GCS: GCS eye subscore is 4. GCS verbal subscore is 5. GCS motor subscore is 6.   Psychiatric:         Speech: Speech normal.         Behavior: Behavior normal.         Thought Content: Thought content normal.         Judgment: Judgment normal.         Assessment/Plan:   1. URI with cough and congestion  - POCT CEPHEID COV-2, FLU A/B, RSV - PCR    Results for orders placed or performed in visit on 09/19/23   POCT CEPHEID COV-2, FLU A/B, RSV - PCR   Result Value Ref Range    SARS-CoV-2 by PCR Negative Negative, Invalid    Influenza virus A RNA Negative Negative, Invalid    Influenza virus B, PCR Negative Negative, Invalid    RSV, PCR Negative Negative, Invalid   Symptomatic care.  -Oral hydration and rest.   -Cough control: nonpharmacologic options for cough relief such as throat lozenges, hot tea, honey.  -Over the counter expectorant as directed; Guaifenesin (Mucinex).  -Tylenol for pain and fever as directed.   -Warm salt water gargles.  -OTC Throat lozenges or spray (Cepacol).    Seek emergency medical care immediately for: Trouble breathing, persistent pain or pressure in the chest, confusion, inability to wake or stay awake, bluish lips or face, persistent tachycardia (fast heart rate), prolonged dizziness, persistent high grade fevers. Follow up for prolonged cough, persistent wheezing, persistent throat pain, difficulty swallowing, persistent fevers, leg swelling, or any other concerns. Follow  up with your Primary Care Provider.     -Discussed viral etiology. COVID S&S, and self isolation guidelines. S&S of PNA with follow up. Stable Vitals.    Differential diagnosis, natural history, supportive care, and indications for immediate follow-up discussed.

## 2023-10-27 ENCOUNTER — HOSPITAL ENCOUNTER (OUTPATIENT)
Dept: LAB | Facility: MEDICAL CENTER | Age: 78
End: 2023-10-27
Attending: STUDENT IN AN ORGANIZED HEALTH CARE EDUCATION/TRAINING PROGRAM
Payer: MEDICARE

## 2023-10-27 DIAGNOSIS — E78.2 MIXED HYPERLIPIDEMIA: ICD-10-CM

## 2023-10-27 DIAGNOSIS — Z00.00 PREVENTATIVE HEALTH CARE: ICD-10-CM

## 2023-10-27 DIAGNOSIS — E89.0 POSTOPERATIVE HYPOTHYROIDISM: ICD-10-CM

## 2023-10-27 DIAGNOSIS — I25.10 CORONARY ARTERY DISEASE INVOLVING NATIVE CORONARY ARTERY OF NATIVE HEART WITHOUT ANGINA PECTORIS: ICD-10-CM

## 2023-10-27 DIAGNOSIS — I10 ESSENTIAL HYPERTENSION: ICD-10-CM

## 2023-10-27 LAB
ALBUMIN SERPL BCP-MCNC: 4.4 G/DL (ref 3.2–4.9)
ALBUMIN/GLOB SERPL: 1.5 G/DL
ALP SERPL-CCNC: 60 U/L (ref 30–99)
ALT SERPL-CCNC: 19 U/L (ref 2–50)
ANION GAP SERPL CALC-SCNC: 13 MMOL/L (ref 7–16)
AST SERPL-CCNC: 23 U/L (ref 12–45)
BILIRUB SERPL-MCNC: 0.4 MG/DL (ref 0.1–1.5)
BUN SERPL-MCNC: 19 MG/DL (ref 8–22)
CALCIUM ALBUM COR SERPL-MCNC: 8.6 MG/DL (ref 8.5–10.5)
CALCIUM SERPL-MCNC: 8.9 MG/DL (ref 8.4–10.2)
CHLORIDE SERPL-SCNC: 105 MMOL/L (ref 96–112)
CHOLEST SERPL-MCNC: 147 MG/DL (ref 100–199)
CO2 SERPL-SCNC: 25 MMOL/L (ref 20–33)
CREAT SERPL-MCNC: 0.94 MG/DL (ref 0.5–1.4)
FASTING STATUS PATIENT QL REPORTED: NORMAL
GFR SERPLBLD CREATININE-BSD FMLA CKD-EPI: 84 ML/MIN/1.73 M 2
GLOBULIN SER CALC-MCNC: 2.9 G/DL (ref 1.9–3.5)
GLUCOSE SERPL-MCNC: 102 MG/DL (ref 65–99)
HBV SURFACE AB SERPL IA-ACNC: 1711 MIU/ML (ref 0–10)
HDLC SERPL-MCNC: 52 MG/DL
LDLC SERPL CALC-MCNC: 70 MG/DL
POTASSIUM SERPL-SCNC: 4.3 MMOL/L (ref 3.6–5.5)
PROT SERPL-MCNC: 7.3 G/DL (ref 6–8.2)
SODIUM SERPL-SCNC: 143 MMOL/L (ref 135–145)
TRIGL SERPL-MCNC: 123 MG/DL (ref 0–149)
TSH SERPL DL<=0.005 MIU/L-ACNC: 1.08 UIU/ML (ref 0.38–5.33)

## 2023-10-27 PROCEDURE — 36415 COLL VENOUS BLD VENIPUNCTURE: CPT

## 2023-10-27 PROCEDURE — 80061 LIPID PANEL: CPT

## 2023-10-27 PROCEDURE — 80053 COMPREHEN METABOLIC PANEL: CPT

## 2023-10-27 PROCEDURE — 86706 HEP B SURFACE ANTIBODY: CPT

## 2023-10-27 PROCEDURE — 84443 ASSAY THYROID STIM HORMONE: CPT

## 2023-11-19 DIAGNOSIS — I65.23 BILATERAL CAROTID ARTERY STENOSIS: ICD-10-CM

## 2023-11-21 RX ORDER — CLOPIDOGREL BISULFATE 75 MG/1
75 TABLET ORAL
Qty: 100 TABLET | Refills: 1 | Status: SHIPPED | OUTPATIENT
Start: 2023-11-21

## 2023-12-10 DIAGNOSIS — E78.2 MIXED HYPERLIPIDEMIA: ICD-10-CM

## 2023-12-10 DIAGNOSIS — I25.10 CORONARY ARTERY DISEASE INVOLVING NATIVE CORONARY ARTERY OF NATIVE HEART WITHOUT ANGINA PECTORIS: ICD-10-CM

## 2023-12-12 RX ORDER — ATORVASTATIN CALCIUM 20 MG/1
20 TABLET, FILM COATED ORAL DAILY
Qty: 100 TABLET | Refills: 1 | Status: SHIPPED | OUTPATIENT
Start: 2023-12-12

## 2023-12-12 NOTE — TELEPHONE ENCOUNTER
Is the patient due for a refill? Yes    Was the patient seen the past year? No    Date of last office visit: 09/13/22    Does the patient have an upcoming appointment?  Yes   If yes, When? 5/17/2024    Provider to refill:CHERY    Does the patients insurance require a 100 day supply?  Yes

## 2024-01-18 NOTE — PATIENT INSTRUCTIONS
Outpatient Physical Therapy Ortho Treatment Note   Lucy Knapp     Patient Name: Chad Marte  : 1951  MRN: [id number]  Today's Date: 2024      Visit Date: 2024    Visit Dx:    ICD-10-CM ICD-9-CM   1. History of total knee replacement, left  Z96.652 V43.65       Patient Active Problem List   Diagnosis    Primary osteoarthritis involving multiple joints    Hematuria    Proteinuria    H/O prostate biopsy    Primary osteoarthritis of left knee    Hyperlipidemia LDL goal <100    Encounter for annual wellness visit (AWV) in Medicare patient    Personal history of colonic polyps    Encounter for screening for malignant neoplasm of colon        Past Medical History:   Diagnosis Date    Arthritis     Fracture, humerus     Frozen shoulder     Hypercholesteremia     Knee swelling     Periarthritis of shoulder     Rotator cuff syndrome     Tear of meniscus of knee     Welcome to Medicare preventive visit 2016        Past Surgical History:   Procedure Laterality Date    COLONOSCOPY N/A 2016    Procedure: COLONOSCOPY / polypectomy;  Surgeon: Heath Naik MD;  Location:  [facility];  Service:     COLONOSCOPY W/ POLYPECTOMY N/A 2021    Procedure: COLONOSCOPY WITH POLYPECTOMY;  Surgeon: Heath Naik MD;  Location:  [facility];  Service: Gastroenterology;  Laterality: N/A;  cecal polyp  ascending colon polyps x2  rectal polyp    FRACTURE SURGERY Right     R arm- plate    HAND SURGERY      HERNIA REPAIR      TENDON TRANSFER Right     hand    TONSILLECTOMY          PT Ortho       Row Name 24 0755       Subjective    Subjective Comments Pt states he already has so much more motion than he had before.  -GC       Left Lower Ext    Lt Knee Extension/Flexion AROM 0-7-118 degrees prior to treatment and 0-2-128 degrees after stretching  -GC              User Key  (r) = Recorded By, (t) = Taken By, (c) = Cosigned By      Initials Name Provider Type    Stephon Ramos, PT  Gout diet: What's allowed, what's not  Starting a gout diet? Understand which foods are OK and which to avoid.  By [facility] Staff  Gout is a painful form of arthritis that occurs when high levels of uric acid in the blood cause crystals to form and accumulate in and around a joint.  Uric acid is produced when the body breaks down a chemical called purine. Purine occurs naturally in your body, but it's also found in certain foods. Uric acid is eliminated from the body in urine.  A gout diet may help decrease uric acid levels in the blood. A gout diet isn't a cure. But it may lower the risk of recurring gout attacks and slow the progression of joint damage.  People with gout who follow a gout diet generally still need medication to manage pain and to lower levels of uric acid.  Gout diet goals  A gout diet is designed to help you:  • Achieve a healthy weight and good eating habits  • Avoid some, but not all, foods with purines  • Include some foods that can control uric acid levels  A good rule of thumb is to eat moderate portions of healthy foods.  Diet details  The general principles of a gout diet follow typical healthy-diet recommendations:  • Weight loss. Being overweight increases the risk of developing gout, and losing weight lowers the risk of gout. Research suggests that reducing the number of calories and losing weight -- even without a purine-restricted diet -- lower uric acid levels and reduce the number of gout attacks. Losing weight also lessens the overall stress on joints.  • Complex carbs. Eat more fruits, vegetables and whole grains, which provide complex carbohydrates. Avoid foods and beverages with high-fructose corn syrup, and limit consumption of naturally sweet fruit juices.  • Water. Stay well-hydrated by drinking water.  • Fats. Cut back on saturated fats from red meat, fatty poultry and high-fat dairy products.  • Proteins. Focus on lean meat and poultry, low-fat dairy and lentils as  Physical Therapist                                 PT Assessment/Plan       Row Name 01/18/24 0755          PT Assessment    Assessment Comments Pt is doing well with increased knee ROM and good tolerance to his exercise progression.  -GC        PT Plan    PT Plan Comments Pt is continue his HEP daily.  -GC               User Key  (r) = Recorded By, (t) = Taken By, (c) = Cosigned By      Initials Name Provider Type    GC Stephon Maradiaga, PT Physical Therapist                       OP Exercises       Row Name 01/18/24 0755             Subjective    Subjective Comments Pt states he already has so much more motion than he had before.  -GC         Exercise 1    Exercise Name 1 Heel slides  -GC      Time 1 8 min  -GC         Exercise 2    Exercise Name 2 wall slides  -GC      Time 2 8 min  -GC         Exercise 3    Exercise Name 3 Passive knee FLEX stretch  -GC      Reps 3 10  -GC      Time 3 10 secs  -GC         Exercise 4    Exercise Name 4 Hamstring stretch  -GC      Reps 4 15  -GC      Time 4 10 secs  -GC         Exercise 5    Exercise Name 5 Heel prop  -GC      Time 5 5 min  -GC      Additional Comments 3#  -GC         Exercise 6    Exercise Name 6 passive knee EXT stretch  -GC      Reps 6 10  -GC      Time 6 10 secs  -GC         Exercise 7    Exercise Name 7 QS  -GC      Reps 7 25  -GC      Time 7 5 secs  -GC         Exercise 8    Exercise Name 8 SLR  -GC      Reps 8 25  -GC      Time 8 1#  -GC         Exercise 9    Exercise Name 9 Hip ABD  -GC      Reps 9 25  -GC      Time 9 1#  -GC         Exercise 10    Exercise Name 10 SAQ  -GC      Reps 10 25  -GC      Time 10 2#  -GC         Exercise 11    Exercise Name 11 LAQ  -GC      Reps 11 25  -GC      Additional Comments 2#  -GC         Exercise 12    Exercise Name 12 prone hip EXT  -GC      Reps 12 25  -GC      Time 12 1#  -GC                User Key  (r) = Recorded By, (t) = Taken By, (c) = Cosigned By      Initials Name Provider Type    GC Stephon Maradiaga, PT Physical  sources of protein.  Recommendations for specific foods or supplements include:  • Organ and glandular meats. Avoid meats such as liver, kidney and sweetbreads, which have high purine levels and contribute to high blood levels of uric acid.  • Red meat. Limit serving sizes of beef, lamb and pork.  • Seafood. Some types of seafood -- such as anchovies, shellfish, sardines and tuna -- are higher in purines than are other types. But the overall health benefits of eating fish may outweigh the risks for people with gout. Moderate portions of fish can be part of a gout diet.  • High-purine vegetables. Studies have shown that vegetables high in purines, such as asparagus and spinach, don't increase the risk of gout or recurring gout attacks.  • Alcohol. Beer and distilled liquors are associated with an increased risk of gout and recurring attacks. Moderate consumption of wine doesn't appear to increase the risk of gout attacks. Avoid alcohol during gout attacks, and limit alcohol, especially beer, between attacks.  • Sugary foods and beverages. Limit or avoid sugar-sweetened foods such as sweetened cereals, bakery goods and candies. Limit consumption of naturally sweet fruit juices.  • Vitamin C. Vitamin C may help lower uric acid levels. Talk to your doctor about whether a 500-milligram vitamin C supplement fits into your diet and medication plan.  • Coffee. Some research suggests that drinking coffee in moderation, especially regular caffeinated coffee, may be associated with a reduced risk of gout. Drinking coffee may not be appropriate if you have other medical conditions. Talk to your doctor about how much coffee is right for you.  • Cherries. There is some evidence that eating cherries is associated with a reduced risk of gout attacks.  Sample menu  Here's what you might eat during a typical day on a gout diet.  Breakfast  • Whole-grain, unsweetened cereal with skim or low-fat milk  • 1 cup fresh  Therapist                                                    Time Calculation:   Start Time: 0755  Stop Time: 0858  Time Calculation (min): 63 min  Therapy Charges for Today       Code Description Service Date Service Provider Modifiers Qty    [id number]  PT THER PROC EA 15 MIN 1/18/2024 Stephon Maradiaga, PT GP 2                      Stephon Maradiaga, PT  1/18/2024      strawberries  • Coffee  • Water  Lunch  • Roasted chicken breast slices (2 ounces) on a whole-grain roll with mustard  • Mixed green salad with vegetables, 1 tablespoon nuts, and balsamic vinegar and olive oil dressing  • Skim or low-fat milk or water  Afternoon snack  • 1 cup fresh cherries  • Water  Dinner  • Roasted salmon (3 to 4 ounces)  • Roasted or steamed green beans  • 1/2 to 1 cup whole-grain pasta with olive oil and lemon pepper  • Water  • Low-fat yogurt  • 1 cup fresh melon  • Caffeine-free beverage, such as herbal tea  Results  Following a gout diet can help limit uric acid production and increase its elimination. A gout diet isn't likely to lower the uric acid concentration in your blood enough to treat your gout without medication. But it may help decrease the number of attacks and limit their severity.  Following a gout diet, along with limiting calories and getting regular exercise, can also improve your overall health by helping you achieve and maintain a healthy weight.

## 2024-02-09 ENCOUNTER — TELEPHONE (OUTPATIENT)
Dept: HEALTH INFORMATION MANAGEMENT | Facility: OTHER | Age: 79
End: 2024-02-09
Payer: MEDICARE

## 2024-02-19 ENCOUNTER — TELEPHONE (OUTPATIENT)
Dept: HEALTH INFORMATION MANAGEMENT | Facility: OTHER | Age: 79
End: 2024-02-19

## 2024-04-11 DIAGNOSIS — E89.0 POSTOPERATIVE HYPOTHYROIDISM: ICD-10-CM

## 2024-04-11 RX ORDER — LEVOTHYROXINE SODIUM 0.12 MG/1
125 TABLET ORAL
Qty: 100 TABLET | Refills: 3 | Status: SHIPPED | OUTPATIENT
Start: 2024-04-11

## 2024-04-11 NOTE — TELEPHONE ENCOUNTER
Received request via: Patient    Was the patient seen in the last year in this department? Yes    Does the patient have an active prescription (recently filled or refills available) for medication(s) requested? No    Pharmacy Name:   [facility]/pharmacy #[id number] - [place] - [address]  [address] [address]  Phone: [phone] Fax: [phone]    Does the patient have alf Plus and need 100 day supply (blood pressure, diabetes and cholesterol meds only)? Yes, quantity updated to 100 days

## 2024-05-17 ENCOUNTER — OFFICE VISIT (OUTPATIENT)
Dept: CARDIOLOGY | Facility: MEDICAL CENTER | Age: 79
End: 2024-05-17
Attending: INTERNAL MEDICINE
Payer: MEDICARE

## 2024-05-17 VITALS
BODY MASS INDEX: 30.92 KG/M2 | SYSTOLIC BLOOD PRESSURE: 112 MMHG | OXYGEN SATURATION: 96 % | WEIGHT: 204 LBS | HEIGHT: 68 IN | DIASTOLIC BLOOD PRESSURE: 60 MMHG | RESPIRATION RATE: 16 BRPM | HEART RATE: 84 BPM

## 2024-05-17 DIAGNOSIS — I10 ESSENTIAL HYPERTENSION: ICD-10-CM

## 2024-05-17 DIAGNOSIS — Z95.1 S/P CABG (CORONARY ARTERY BYPASS GRAFT): ICD-10-CM

## 2024-05-17 DIAGNOSIS — I65.23 BILATERAL CAROTID ARTERY STENOSIS: ICD-10-CM

## 2024-05-17 DIAGNOSIS — I25.10 CORONARY ARTERY DISEASE INVOLVING NATIVE CORONARY ARTERY OF NATIVE HEART WITHOUT ANGINA PECTORIS: ICD-10-CM

## 2024-05-17 DIAGNOSIS — I35.0 NONRHEUMATIC AORTIC VALVE STENOSIS: ICD-10-CM

## 2024-05-17 DIAGNOSIS — E78.2 MIXED HYPERLIPIDEMIA: ICD-10-CM

## 2024-05-17 PROCEDURE — 3074F SYST BP LT 130 MM HG: CPT | Performed by: INTERNAL MEDICINE

## 2024-05-17 PROCEDURE — G2211 COMPLEX E/M VISIT ADD ON: HCPCS | Performed by: INTERNAL MEDICINE

## 2024-05-17 PROCEDURE — 3078F DIAST BP <80 MM HG: CPT | Performed by: INTERNAL MEDICINE

## 2024-05-17 PROCEDURE — 99214 OFFICE O/P EST MOD 30 MIN: CPT | Performed by: INTERNAL MEDICINE

## 2024-05-17 RX ORDER — ATORVASTATIN CALCIUM 40 MG/1
40 TABLET, FILM COATED ORAL NIGHTLY
Qty: 90 TABLET | Refills: 3 | Status: SHIPPED | OUTPATIENT
Start: 2024-05-17

## 2024-05-17 RX ORDER — CLOPIDOGREL BISULFATE 75 MG/1
75 TABLET ORAL
Qty: 100 TABLET | Refills: 1 | Status: SHIPPED | OUTPATIENT
Start: 2024-05-17

## 2024-05-17 ASSESSMENT — FIBROSIS 4 INDEX: FIB4 SCORE: 1.73

## 2024-05-17 ASSESSMENT — ENCOUNTER SYMPTOMS
NERVOUS/ANXIOUS: 0
DOUBLE VISION: 0
ABDOMINAL PAIN: 0
VOMITING: 0
DEPRESSION: 0
DIZZINESS: 0
COUGH: 0
RESPIRATORY NEGATIVE: 1
NAUSEA: 0
MYALGIAS: 0
PSYCHIATRIC NEGATIVE: 1
GASTROINTESTINAL NEGATIVE: 1
CLAUDICATION: 0
BLURRED VISION: 0
MUSCULOSKELETAL NEGATIVE: 1
EYES NEGATIVE: 1
WEAKNESS: 0
CARDIOVASCULAR NEGATIVE: 1
BRUISES/BLEEDS EASILY: 0
SHORTNESS OF BREATH: 0
HEADACHES: 0
FOCAL WEAKNESS: 0
WEIGHT LOSS: 0
FEVER: 0
PALPITATIONS: 0
CONSTITUTIONAL NEGATIVE: 1
NEUROLOGICAL NEGATIVE: 1
CHILLS: 0

## 2024-05-17 NOTE — PROGRESS NOTES
No chief complaint on file.      Subjective     Ed Ed Becerra is a 78 y.o. male who presents today for annual follow up of coronary artery disease with prior coronary artery bypass graft surgery, aortic stenosis, hypertension and hyperlipidemia.    Since the patient's last visit on 09/13/22, he has been doing well clinically from cardiac standpoint. He admit to sciatica. He denies fatigue, chest pain, shortness of breath, palpitations, lower extremity edema, dizziness or syncope. He has not been active due to his pain.     Past Medical History:   Diagnosis Date    CAD (coronary artery disease)     Cancer (HCC)     Prostate CA- Prostatectomy done    Carotid artery disease (HCC)     Disorder of thyroid 10/18/2018    High cholesterol     Hyperlipidemia     Hypertension     Unspecified cataract     Unspecified urinary incontinence 10/18/2018    Valvular heart disease      Past Surgical History:   Procedure Laterality Date    THYROIDECTOMY TOTAL  10/23/2018    Procedure: THYROIDECTOMY TOTAL;  Surgeon: Angel Adams M.D.;  Location: [facility];  Service: Ent    NECK DISSECTION  10/23/2018    Procedure: NECK DISSECTION - POSSIBLE LIMITED CENTRAL;  Surgeon: Angel Adams M.D.;  Location: [facility];  Service: Ent    CATARACT PHACO WITH IOL Left 7/2/2015    Procedure: CATARACT PHACO WITH IOL;  Surgeon: Matty Augustin M.D.;  Location: [facility];  Service:     CATARACT PHACO WITH IOL Right 6/4/2015    Procedure: CATARACT PHACO WITH IOL;  Surgeon: Matty Augustin M.D.;  Location: [facility];  Service:     MULTIPLE CORONARY ARTERY BYPASS ENDO VEIN HARVEST  12/15/2014    Performed by Gustavo Richardson M.D. at SUR[facility]    PROSTATECTOMY, RADICAL RETRO  12/17/2008     Family History   Problem Relation Age of Onset    Cancer Sister         colon    Cancer Brother         prostate    Heart Disease Mother     Arthritis Mother     Hypertension Mother      Hyperlipidemia Mother     Stroke Mother     Heart Disease Father     Hypertension Father     Hyperlipidemia Father     Stroke Father     Arthritis Brother     Hyperlipidemia Brother     Diabetes Neg Hx      Social History     Socioeconomic History    Marital status:      Spouse name: Not on file    Number of children: Not on file    Years of education: Not on file    Highest education level: Bachelor's degree (e.g., BA, AB, BS)   Occupational History    Not on file   Tobacco Use    Smoking status: Never    Smokeless tobacco: Never    Tobacco comments:     continued abstinence   Vaping Use    Vaping status: Never Used   Substance and Sexual Activity    Alcohol use: No     Alcohol/week: 0.0 oz    Drug use: No    Sexual activity: Not Currently     Partners: Female     Comment: s/p prostatectomy   Other Topics Concern    Not on file   Social History Narrative    Clint was born and raised in the [facility]. He has been in [place] since 1989. He continues to work doing taxes, he is a CPA. He is  to Aleksandra for 50 years, they met in college. They have 3 kids- Larissa (49; [place]), Marina (46; [place]), Atif (39; [place]). They enjoy spending times with their grandkids, 4 in [place] and 2 in [place].      Social Determinants of Health     Financial Resource Strain: Low Risk  (8/23/2021)    Overall Financial Resource Strain (CARDIA)     Difficulty of Paying Living Expenses: Not hard at all   Food Insecurity: No Food Insecurity (12/12/2022)    Hunger Vital Sign     Worried About Running Out of Food in the Last Year: Never true     Ran Out of Food in the Last Year: Never true   Transportation Needs: No Transportation Needs (12/12/2022)    PRAPARE - Transportation     Lack of Transportation (Medical): No     Lack of Transportation (Non-Medical): No   Physical Activity: Insufficiently Active (12/12/2022)    Exercise Vital Sign     Days of Exercise per Week: 1 day     Minutes of Exercise per Session: 10 min   Stress: No Stress  Concern Present (12/12/2022)    [place] [employer] - Occupational Stress Questionnaire     Feeling of Stress : Only a little   Social Connections: Moderately Integrated (12/12/2022)    Social Connection and Isolation Panel [NHANES]     Frequency of Communication with Friends and Family: Three times a week     Frequency of Social Gatherings with Friends and Family: Once a week     Attends Nondenominational Services: More than 4 times per year     Active Member of Clubs or Organizations: No     Attends Club or Organization Meetings: Never     Marital Status:    Intimate Partner Violence: Not on file   Housing Stability: Low Risk  (12/12/2022)    Housing Stability Vital Sign     Unable to Pay for Housing in the Last Year: No     Number of Places Lived in the Last Year: 1     Unstable Housing in the Last Year: No     No Known Allergies    (Medications reviewed.)  Outpatient Encounter Medications as of 5/17/2024   Medication Sig Dispense Refill    levothyroxine (SYNTHROID) 125 MCG Tab TAKE 1 TABLET BY MOUTH EVERY DAY IN THE MORNING ON AN EMPTY STOMACH 100 Tablet 3    atorvastatin (LIPITOR) 20 MG Tab TAKE 1 TABLET BY MOUTH EVERY  Tablet 1    clopidogrel (PLAVIX) 75 MG Tab Take 1 Tablet by mouth every day. **LAST SEEN 9/2022 .  TO ENSURE FURTHER REFILLS, KEEP SCHEDULED FOLLOW UP VISIT 5/17/2024.** 100 Tablet 1    metoprolol tartrate (LOPRESSOR) 25 MG Tab Take 1 Tablet by mouth 2 times a day. TAKE 1 TABLET BY MOUTH TWICE A  Tablet 3    losartan (COZAAR) 100 MG Tab Take 1 Tablet by mouth every day. 100 Tablet 3    latanoprost (XALATAN) 0.005 % Solution INSTILL 1 DROP BOTH EYES EVERY MORNING      Multiple Vitamins-Minerals (MULTIVITAMIN ADULT PO) Take 1 Tab by mouth every day.      Calcium 600 MG Tab Take 1 Tab by mouth every day.       No facility-administered encounter medications on file as of 5/17/2024.     Review of Systems   Constitutional: Negative.  Negative for chills, fever,  "malaise/fatigue and weight loss.   HENT: Negative.  Negative for hearing loss.    Eyes: Negative.  Negative for blurred vision and double vision.   Respiratory: Negative.  Negative for cough and shortness of breath.    Cardiovascular: Negative.  Negative for chest pain, palpitations, claudication and leg swelling.   Gastrointestinal: Negative.  Negative for abdominal pain, nausea and vomiting.   Genitourinary: Negative.  Negative for dysuria and urgency.   Musculoskeletal: Negative.  Negative for joint pain and myalgias.   Skin: Negative.  Negative for itching and rash.   Neurological: Negative.  Negative for dizziness, focal weakness, weakness and headaches.   Endo/Heme/Allergies: Negative.  Does not bruise/bleed easily.   Psychiatric/Behavioral: Negative.  Negative for depression. The patient is not nervous/anxious.               Objective     /60 (BP Location: Left arm, Patient Position: Sitting, BP Cuff Size: Adult)   Pulse 84   Resp 16   Ht 1.727 m (5' 8\")   Wt 92.5 kg (204 lb)   SpO2 96%   BMI 31.02 kg/m²     Physical Exam  Constitutional:       Appearance: Normal appearance. He is well-developed and normal weight.   HENT:      Head: Normocephalic and atraumatic.   Neck:      Vascular: No JVD.   Cardiovascular:      Rate and Rhythm: Normal rate and regular rhythm.      Heart sounds: Murmur heard.   Pulmonary:      Effort: Pulmonary effort is normal.      Breath sounds: Normal breath sounds.   Abdominal:      General: Bowel sounds are normal.      Palpations: Abdomen is soft.      Comments: No hepatosplenomegaly.   Musculoskeletal:         General: Normal range of motion.   Lymphadenopathy:      Cervical: No cervical adenopathy.   Skin:     General: Skin is warm and dry.   Neurological:      Mental Status: He is alert and oriented to person, place, and time.            CARDIAC STUDIES/PROCEDURES:     CARDIAC CATHETERIZATION CONCLUSIONS (12/13/14)  1. Two-vessel coronary artery disease with mid left " anterior descending   artery, ostial diagonal branch and proximal circumflex artery stenosis.   2. Normal left ventricular systolic function with ejection fraction of 55%.   3. Mildly elevated left ventricular end diastolic pressure.   4. Mild aortic stenosis.    CAROTID ULTRASOUND (11/01/22)  1.  There is a moderate amount of atherosclerotic plaque.  Plaque is located in carotid bulbs and proximal internal carotid arteries.  Plaque characterization:  heterogeneous, partially calcified.  2. There is no hemodynamically significant stenosis. Diameter reduction in the internal carotid arteries: less than 50%. There is no evidence of carotid occlusion.  3.  Vertebral arteries demonstrate antegrade flow.  (study result reviewed)      CAROTID ULTRASOUND (04/20/21)  1.  There is moderate amount of atherosclerotic plaque.  Plaque is located in carotid bulbs and proximal internal carotid arteries.  Plaque characterization:  Heterogeneous, partially calcified  2.  There is no evidence of carotid occlusion   3. Vertebral arteries demonstrate antegrade flow.  4. Diameter reduction in the internal carotid arteries: less than 50% where visualized. However, there is markedly decreased flow in the right ICA, with parvus-tardus waveform, suggestive of high-grade upstream ICA stenosis, most likely in the region of   right common carotid bifurcation. Consider further evaluation with neck CTA.     CAROTID ULTRASOUND (05/17/18)  Probable distal right internal carotid occlusion, chronic. No changes from 2017.  Mild stenosis of the left internal carotid (< 50%).   Flow within both subclavian arteries appears to be within normal limits.   Antegrade flow, bilateral vertebral arteries.   Incidental left-sided thyroid cyst noted.  Follow as indicated clinically.     CAROTID ULTRASOUND (12/13/14)  RIGHT:  Very mild smooth plaque of the common carotids & bifurcation with minimal   plaque in the proximal internal carotid artery. The ICA  waveforms are   resistive with low peak systolic & end diastolic velocities which may   suggest distal obstruction or occlusion.  LEFT:  Very mild smooth plaque of the common carotids & bifurcation extending into   the internal carotid. Velocities are consistent with < 50% stenosis of the   internal carotid artery.   Bilateral subclavian and vertebral artery waveforms are antegrade and   waveforms are normal in character and velocity.      CT OF HEAD (12/18/14)  1. Occlusion or near occlusion of the intracranial right internal carotid artery with apparent tiny branch extending to the middle cerebral artery. The right middle cerebral artery appears to be supplied primarily by the anterior communicating artery.     CTA OF NECK (04/29/21)  1.  CTA neck stable since 12/14/2014  2.  Long segment approximately 80% stenosis of the right internal carotid artery extending from approximately 2 cm distal to the carotid bifurcation to the skull base. The supraclinoid portion has either occlusion with reconstitution or a very high-grade stenosis with reconstitution  3.  Findings are consistent with either a chronic dissection or atherosclerotic narrowing  4.  No other significant finding     CT OF NECK (12/18/14)  1. Diffuse narrowing of the right internal carotid artery beginning approximately 2 cm distal to its origin could be due to diffuse atherosclerotic disease or chronic dissection.  2. Occlusion or near occlusion of the intracranial portion of the right internal carotid artery.  3. No evidence of left internal carotid artery stenosis.     ECHOCARDIOGRAM CONCLUSIONS (07/06/22)  Prior study 04/23/21, compared to the report of the prior study, there   has been no significant change.   Normal left ventricular systolic function.  The left ventricular ejection fraction is visually estimated to be 60%.  Mild aortic valve stenosis.  Vmax is 2.64 m/s. Transvalvular gradients are - Peak: 27  mmHg, Mean: 15  mmHg  Mild tricuspid  regurgitation.  Right ventricular systolic pressure is estimated to be 35 mmHg.  (study result reviewed)     ECHOCARDIOGRAM CONCLUSIONS (04/23/21)  Prior echo 05/17/18. Compared to the report of the study done - there   has been no significant change.   Normal left ventricular systolic function.  Left ventricular ejection fraction is visually estimated to be 65%.  Mild mitral regurgitation.  Mild aortic stenosis.  Vmax is 2.3 m/s. Transvalvular gradients are - Peak: 22 mmHg, Mean: 14 mmHg.   Mild tricuspid regurgitation.  Estimated right ventricular systolic pressure is 35 mmHg.     ECHOCARDIOGRAM CONCLUSIONS (05/17/18)  Prior echo 5-18-17. Compared to the report of the study done - there has been no significant change.   Normal left ventricular systolic function.  Left ventricular ejection fraction is visually estimated to be 60%.  Grade II diastolic dysfunction.  Mild mitral regurgitation.  Aortic sclerosis with mild stenosis.  Vmax is 2.01 m/s. Transvalvular gradients are - Peak: 16 mmHg,  Mean: 9 mmHg.  Mild tricuspid regurgitation.  Estimated right ventricular systolic pressure is 45 mmHg.     ECHOCARDIOGRAM CONCLUSIONS (05/17/18)  Prior echo 5-18-17. Compared to the report of the study done - there has been no significant change.   Normal left ventricular systolic function.  Left ventricular ejection fraction is visually estimated to be 60%.  Grade II diastolic dysfunction.  Mild mitral regurgitation.  Aortic sclerosis with mild stenosis.  Vmax is 2.01 m/s. Transvalvular gradients are - Peak: 16 mmHg, Mean: 9 mmHg.  Mild tricuspid regurgitation.  Estimated right ventricular systolic pressure is 45 mmHg.     ECHOCARDIOGRAM CONCLUSIONS (05/18/17)  Compared to the prior echo dated 7/9/15, the aortic valve still appears mildly stenotic.  1. Left ventricular ejection fraction is visually estimated to be 60%.   Grade II diastolic dysfunction.  2. There is mild aortic stenosis.  AV Peak Velocity 1.9 m/s                  AV Peak Gradient 14.6 mmHg               AV Mean Gradient 8 mmHg   3. Estimated right ventricular systolic pressure  is 30 mmHg.     ECHOCARDIOGRAM CONCLUSIONS (07/09/15)  Normal left ventricular systolic function.  Moderate concentric left ventricular hypertrophy.  Normal diastolic function - normal mitral inflow pattern and E/E' is normal.  Mitral annular calcification.  Mild mitral regurgitation.  Mild aortic stenosis.  Mild tricuspid regurgitation.     ECHOCARDIOGRAM CONCLUSIONS (12/12/14)  Technically good study.  Normal left ventricular systolic function.  Left ventricular ejection fraction is 60% to 65%.  Grade I diastolic dysfunction - mitral inflow E/A is <1.0.  Mild mitral regurgitation.  Mild aortic stenosis.  Mild tricuspid regurgitation.     EKG performed on (12/18/14) EKG shows atrial fibrillation. (Post CABG)  EKG performed on (12/13/14) EKG shows normal sinus rhythm.     Laboratory results of (05/26/22) were reviewed. Cholesterol profile of 1555/97/50/86 mg/dL noted.  Laboratory results of (07/22/21) Cholesterol profile of 147/119/57/66 mg/dL noted.  Laboratory results of (02/18/21) Cholesterol profile of 158/96/46/93 mg/dL noted.  Laboratory results of (12/20/19) Cholesterol profile of 141/118/44/73 mg/dl noted.  Laboratory results of (07/20/19) Cholesterol profile of 145/78/53/76 noted.  Laboratory results of (09/11/18) Cholesterol profile of 152/82/48/88 noted.  Laboratory results of (03/13/18) Cholesterol profile of 141/78/51/74 noted.  Laboratory results of (03/08/17) Cholesterol profile of 158/97/58/81 noted.  Laboratory results of (06/03/16) Cholesterol profile of 106/102/45/41 noted.  Laboratory results of (12/13/14) Cholesterol profile of 139/98/46/73 noted.    MYOCARDIAL PERFUSION STUDY CONCLUSIONS (09/19/22)  NUCLEAR IMAGING INTERPRETATION  Normal Lexiscan myocardial perfusion study.  No evidence of ischemia or infarct.  SDS 0.  LVEF 75%.  No ischemic changes with  Regadenoson.  Occasional PVC's.  No chest pain.  ECG INTERPRETATION  Negative stress ECG for ischemia.  (study result reviewed)      MYOCARDIAL PERFUSION STUDY CONCLUSIONS (09/17/18)  No evidence of significant jeopardized viable myocardium or prior myocardial infarction.  Normal left ventricular size, ejection fraction, and wall motion.  Nondiagnostic stress test.   ECG INTERPRETATION  Negative stress ECG for ischemia.       Assessment & Plan     1. Coronary artery disease involving native coronary artery of native heart without angina pectoris        2. S/P CABG (coronary artery bypass graft)        3. Nonrheumatic aortic valve stenosis        4. Essential hypertension        5. Mixed hyperlipidemia            Medical Decision Making: Today's Assessment/Status/Plan:        Coronary artery disease with prior coronary bypass graft (x 3 with left internal mammary artery graft to left anterior descending artery, saphenous vein graft to diagonal branch, saphenous vein graft to distal obtuse marginal branch by Dr. Richardson 12/15/14): He is clinically doing well. I will continue with current medical care including clopidogrel, metoprolol, losartan, atorvastatin.  Aortic stenosis: He remains asymptomatic with mild aortic stenosis. We will follow him clinically and repeat an echocardiogram.   Hypertension: Blood pressure is well controlled. We will continue with beta blockade therapy and angiotensin receptor blockade.  Hyperlipidemia: He is doing well on statin therapy without myalgia symptoms. His LDL level is not at target. We will increase atorvastatin dose to 40 mg. We will repeat labs including fasting lipid profile.   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 No intervention is warranted per Andrea Askew on 12/14/14; Per Vito Barone 08/08/117: Based on the patient's asymptomatic status and the fact  that the carotid artery occlusion is in the distal internal carotid artery which is surgically inaccessible I have recommended continued conservative management. The fact that he has no symptoms of TIA or stroke would led itself to a more conservative approach. I don't think that the would benefit from any type of neuro-interventional consultation based on the fact that the patient is asymptomatic.); We will repeat a carotid ultrasound.     We will follow up in 3 months.    CC ligia Garvey

## 2024-05-23 ENCOUNTER — OFFICE VISIT (OUTPATIENT)
Dept: MEDICAL GROUP | Facility: IMAGING CENTER | Age: 79
End: 2024-05-23
Payer: MEDICARE

## 2024-05-23 VITALS
DIASTOLIC BLOOD PRESSURE: 70 MMHG | BODY MASS INDEX: 31.25 KG/M2 | RESPIRATION RATE: 16 BRPM | TEMPERATURE: 97.5 F | WEIGHT: 206.2 LBS | HEIGHT: 68 IN | HEART RATE: 60 BPM | OXYGEN SATURATION: 98 % | SYSTOLIC BLOOD PRESSURE: 118 MMHG

## 2024-05-23 DIAGNOSIS — Z85.850 HX OF PAPILLARY ADENOCARCINOMA OF THYROID: ICD-10-CM

## 2024-05-23 DIAGNOSIS — D75.89 MACROCYTOSIS WITHOUT ANEMIA: ICD-10-CM

## 2024-05-23 DIAGNOSIS — E89.0 POSTOPERATIVE HYPOTHYROIDISM: ICD-10-CM

## 2024-05-23 DIAGNOSIS — M79.18 LEFT BUTTOCK PAIN: ICD-10-CM

## 2024-05-23 DIAGNOSIS — I10 ESSENTIAL HYPERTENSION: ICD-10-CM

## 2024-05-23 DIAGNOSIS — Z12.5 PROSTATE CANCER SCREENING: ICD-10-CM

## 2024-05-23 DIAGNOSIS — I25.10 CORONARY ARTERY DISEASE INVOLVING NATIVE CORONARY ARTERY OF NATIVE HEART WITHOUT ANGINA PECTORIS: ICD-10-CM

## 2024-05-23 PROCEDURE — 99214 OFFICE O/P EST MOD 30 MIN: CPT | Performed by: INTERNAL MEDICINE

## 2024-05-23 PROCEDURE — 3078F DIAST BP <80 MM HG: CPT | Performed by: INTERNAL MEDICINE

## 2024-05-23 PROCEDURE — 3074F SYST BP LT 130 MM HG: CPT | Performed by: INTERNAL MEDICINE

## 2024-05-23 PROCEDURE — 1126F AMNT PAIN NOTED NONE PRSNT: CPT | Performed by: INTERNAL MEDICINE

## 2024-05-23 ASSESSMENT — PAIN SCALES - GENERAL: PAINLEVEL: NO PAIN

## 2024-05-23 ASSESSMENT — PATIENT HEALTH QUESTIONNAIRE - PHQ9: CLINICAL INTERPRETATION OF PHQ2 SCORE: 0

## 2024-05-23 ASSESSMENT — FIBROSIS 4 INDEX: FIB4 SCORE: 1.73

## 2024-05-23 NOTE — ASSESSMENT & PLAN NOTE
Chronic condition.  Current regimen: levothyroxine 125 mcg daily  Thyroid nodules discovered as an incidental finding on carotid imaging. He is status post total thyroidectomy on 10/23/18. Pathology: Papillary thyroid carcinoma

## 2024-05-23 NOTE — ASSESSMENT & PLAN NOTE
Chronic condition. He has been on blood pressure medication since his early 60's.    Current regimen: Losartan 100 mg daily, metoprolol tartrate 25 mg twice daily  Patient tolerating and reports compliant with medications. He does not regularly monitor blood pressure at home. Does not need refill of medications today. Denies headache, dizziness, vision changes, chest pain, dyspnea, edema.

## 2024-05-23 NOTE — ASSESSMENT & PLAN NOTE
Chronic condition.  Current regimen: levothyroxine 125 mcg daily  Thyroid nodules discovered as an incidental finding on carotid imaging. He is status post total thyroidectomy on 10/23/18. Pathology: Papillary thyroid carcinoma    On levothyroxine 125 mcg daily

## 2024-05-23 NOTE — ASSESSMENT & PLAN NOTE
Chronic condition. Followed by cardiology Dr. Garcia annually.  Status post cardiac triple bypass surgery with left internal mammary artery graft to left anterior descending artery, saphenous vein graft to diagonal branch, saphenous vein graft to distal obtuse marginal branch by Dr. Richardson (12/15/14).  Current regimen: Plavix 75mg daily, atorvastatin 40mg daily, losartan 100mg daily, metoprolol tartrate 25mg BID  Denies chest pain, SOB, palpitation, leg edema

## 2024-05-23 NOTE — PROGRESS NOTES
Established Patient    Joshua Becerra is a 78 y.o. male who presents today with the following:    CC:   Chief Complaint   Patient presents with    Establish Care     Prior PCP- Ish Cornejo DO     Medication Management     Just saw Dr Garcia and upped the dosing on atorvastatin     Back Pain     Pt states he has left hip pain that goes down to his ankle started two months ago. No previous PT. OTC none        HPI:     Verbal consent was acquired by the patient to use tok tok tok ambient listening note generation during this visit Yes       History of Present Illness  The patient is a 78-year-old male who presents for evaluation of left buttock pain. He is accompanied by his wife.    The patient reports experiencing left buttock pain, which radiates to his left leg and left ankle. This pain, which has been present for the past 2 months, is constant and intensifies during the night. He denies any specific injury but speculates that it may be due to heavy lifting or physical strain from pulling out weeds while working. Despite the pain, his ankle remains unaffected. He has attempted to alleviate the pain with Salonpas, Aspercreme, and a heating pad, both of which provide some relief. He also reports a slight limp during ambulation. The patient's pain is exacerbated by walking, but subsides upon sitting.    The patient's current medications include atorvastatin, Plavix, and metoprolol, which induce itching. He consults with an ophthalmologist for glaucoma and uses eye drops. He has a scheduled appointment with his cardiologist in 09/2024. 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 He is under the care of an endocrinologist. In 2008, he underwent a prostatectomy due to prostate cancer. His PSA levels were elevated at the time, but have since decreased to less than 1. He denies any urinary symptoms. He no longer sees a urologist. He maintains regular dental check-ups every 4  months.    Supplemental Information  He denies any chest pain or shortness of breath. He had gout symptoms a long time ago, and he was given colchicine. It took it away right away. He has been controlling his red meat intake. He eats more chicken now.   He does not smoke or drink. He quit after his heart surgery. He would drink occasionally, but not as bad as before. He is trying to lose weight.    Problem   Left Buttock Pain    Left buttock pain, radiating left leg   Worse with walking  Better with sitting  Trigger: heavy lifting, pulling weeds       Macrocytosis Without Anemia    Macrocytosis  TSH, B12, folate okay.   Does not drink ETOH     Postoperative Hypothyroidism    Chronic condition.  Current regimen: levothyroxine 125 mcg daily  Thyroid nodules discovered as an incidental finding on carotid imaging. He is status post total thyroidectomy on 10/23/18. Pathology: Papillary thyroid carcinoma    On levothyroxine 125 mcg daily     Hx of Papillary Adenocarcinoma of Thyroid    Chronic condition.  Current regimen: levothyroxine 125 mcg daily  Thyroid nodules discovered as an incidental finding on carotid imaging. He is status post total thyroidectomy on 10/23/18. Pathology: Papillary thyroid carcinoma         Cad (Coronary Artery Disease)    Chronic condition. Followed by cardiology Dr. Garcia annually.  Status post cardiac triple bypass surgery with left internal mammary artery graft to left anterior descending artery, saphenous vein graft to diagonal branch, saphenous vein graft to distal obtuse marginal branch by Dr. Richardson (12/15/14).  Current regimen: Plavix 75mg daily, atorvastatin 40mg daily, losartan 100mg daily, metoprolol tartrate 25mg BID  Denies chest pain, SOB, palpitation, leg edema     Essential Hypertension    Chronic condition. He has been on blood pressure medication since his early 60's.    Current regimen: Losartan 100 mg daily, metoprolol tartrate 25 mg twice daily  Patient tolerating and  "reports compliant with medications. He does not regularly monitor blood pressure at home. Does not need refill of medications today. Denies headache, dizziness, vision changes, chest pain, dyspnea, edema.          Current Outpatient Medications   Medication Sig    clopidogrel (PLAVIX) 75 MG Tab Take 1 Tablet by mouth every day. **LAST SEEN 9/2022 .  TO ENSURE FURTHER REFILLS, KEEP SCHEDULED FOLLOW UP VISIT 5/17/2024.**    atorvastatin (LIPITOR) 40 MG Tab Take 1 Tablet by mouth every evening.    levothyroxine (SYNTHROID) 125 MCG Tab TAKE 1 TABLET BY MOUTH EVERY DAY IN THE MORNING ON AN EMPTY STOMACH    metoprolol tartrate (LOPRESSOR) 25 MG Tab Take 1 Tablet by mouth 2 times a day. TAKE 1 TABLET BY MOUTH TWICE A DAY    losartan (COZAAR) 100 MG Tab Take 1 Tablet by mouth every day.    latanoprost (XALATAN) 0.005 % Solution INSTILL 1 DROP BOTH EYES EVERY MORNING    Multiple Vitamins-Minerals (MULTIVITAMIN ADULT PO) Take 1 Tab by mouth every day.    Calcium 600 MG Tab Take 1 Tab by mouth every day.     No Known Allergies    Allergies, past medical history, past surgical history, medications, family history, social history reviewed and updated.    ROS Please see HPI    Physical Exam  Vitals: /70 (BP Location: Left arm, Patient Position: Sitting, BP Cuff Size: Adult)   Pulse 60   Temp 36.4 °C (97.5 °F) (Temporal)   Resp 16   Ht 1.727 m (5' 8\")   Wt 93.5 kg (206 lb 3.2 oz)   SpO2 98%   BMI 31.35 kg/m²   Physical Exam  Constitutional:       General: He is not in acute distress.     Appearance: Normal appearance.   HENT:      Head: Normocephalic and atraumatic.      Nose: Nose normal.      Mouth/Throat:      Pharynx: Oropharynx is clear.   Eyes:      Extraocular Movements: Extraocular movements intact.      Conjunctiva/sclera: Conjunctivae normal.   Cardiovascular:      Rate and Rhythm: Normal rate and regular rhythm.      Heart sounds: Murmur heard.   Pulmonary:      Effort: Pulmonary effort is normal.      " Breath sounds: Normal breath sounds.   Abdominal:      General: Bowel sounds are normal.      Palpations: Abdomen is soft.      Tenderness: There is no abdominal tenderness.   Musculoskeletal:      Right lower leg: No edema.      Left lower leg: No edema.   Neurological:      Mental Status: He is alert. Mental status is at baseline.   Psychiatric:         Mood and Affect: Mood normal.         Behavior: Behavior normal.         Thought Content: Thought content normal.         Judgment: Judgment normal.         Assessment and Plan    1. Left buttock pain  - DX-LUMBAR SPINE-4+ VIEWS; Future  - DX-SACROILIAC JOINTS 3+; Future  - Referral to Orthopedics  - Referral to Physical Therapy    2. Postoperative hypothyroidism  - Referral to Endocrinology  - TSH WITH REFLEX TO FT4; Future  Chronic condition.  Current regimen: levothyroxine 125 mcg daily  Thyroid nodules discovered as an incidental finding on carotid imaging. He is status post total thyroidectomy on 10/23/18. Pathology: Papillary thyroid carcinoma    On levothyroxine 125 mcg daily    3. Hx of papillary adenocarcinoma of thyroid  - Referral to Endocrinology  - TSH WITH REFLEX TO FT4; Future    4. Essential hypertension  - CBC WITH DIFFERENTIAL; Future  - Comp Metabolic Panel; Future  - Lipid Profile; Future    5. Coronary artery disease involving native coronary artery of native heart without angina pectoris  - CBC WITH DIFFERENTIAL; Future  - Comp Metabolic Panel; Future  - Lipid Profile; Future  Chronic condition. Followed by cardiology Dr. Garcia annually.  Status post cardiac triple bypass surgery with left internal mammary artery graft to left anterior descending artery, saphenous vein graft to diagonal branch, saphenous vein graft to distal obtuse marginal branch by Dr. Richardson (12/15/14).  Current regimen: Plavix 75mg daily, atorvastatin 40mg daily, losartan 100mg daily, metoprolol tartrate 25mg BID  Denies chest pain, SOB, palpitation, leg edema    6.  Prostate cancer screening  - PROSTATE SPECIFIC AG SCREENING; Future    7. Macrocytosis without anemia  - TSH WITH REFLEX TO FT4; Future  - VITAMIN B12; Future  - FOLATE; Future  - METHYLMALONIC ACID, SERUM; Future  - LDH; Future  - RETICULOCYTES COUNT; Future  - Monoclonal Protein and FLC, Serum; Future  Macrocytosis  TSH, B12, folate okay.   Does not drink ETOH        Follow-up:Return in about 1 month (around 6/23/2024), or if symptoms worsen or fail to improve.    This note was created using voice recognition software. There may be unintended errors in spelling, grammar or content.

## 2024-05-23 NOTE — ASSESSMENT & PLAN NOTE
Left buttock pain, radiating left leg   Worse with walking  Better with sitting  Trigger: heavy lifting, pulling weeds

## 2024-06-17 ENCOUNTER — APPOINTMENT (OUTPATIENT)
Dept: CARDIOLOGY | Facility: MEDICAL CENTER | Age: 79
End: 2024-06-17
Attending: INTERNAL MEDICINE
Payer: MEDICARE

## 2024-06-18 ENCOUNTER — ANCILLARY PROCEDURE (OUTPATIENT)
Dept: CARDIOLOGY | Facility: MEDICAL CENTER | Age: 79
End: 2024-06-18
Attending: INTERNAL MEDICINE
Payer: MEDICARE

## 2024-06-18 DIAGNOSIS — Z95.1 S/P CABG (CORONARY ARTERY BYPASS GRAFT): ICD-10-CM

## 2024-06-18 DIAGNOSIS — I25.10 CORONARY ARTERY DISEASE INVOLVING NATIVE CORONARY ARTERY OF NATIVE HEART WITHOUT ANGINA PECTORIS: ICD-10-CM

## 2024-06-18 DIAGNOSIS — I35.0 NONRHEUMATIC AORTIC VALVE STENOSIS: ICD-10-CM

## 2024-06-18 DIAGNOSIS — I10 ESSENTIAL HYPERTENSION: ICD-10-CM

## 2024-06-18 LAB
LV EJECT FRACT  99904: 55
LV EJECT FRACT MOD 2C 99903: 53.73
LV EJECT FRACT MOD 4C 99902: 50.33

## 2024-06-18 PROCEDURE — 93306 TTE W/DOPPLER COMPLETE: CPT

## 2024-06-18 PROCEDURE — 93306 TTE W/DOPPLER COMPLETE: CPT | Mod: 26 | Performed by: INTERNAL MEDICINE

## 2024-07-12 ENCOUNTER — APPOINTMENT (OUTPATIENT)
Dept: MEDICAL GROUP | Facility: IMAGING CENTER | Age: 79
End: 2024-07-12
Payer: MEDICARE

## 2024-07-12 VITALS
OXYGEN SATURATION: 93 % | WEIGHT: 204 LBS | TEMPERATURE: 97.8 F | HEIGHT: 68 IN | HEART RATE: 60 BPM | SYSTOLIC BLOOD PRESSURE: 122 MMHG | DIASTOLIC BLOOD PRESSURE: 64 MMHG | BODY MASS INDEX: 30.92 KG/M2 | RESPIRATION RATE: 14 BRPM

## 2024-07-12 DIAGNOSIS — M54.50 CHRONIC BILATERAL LOW BACK PAIN WITHOUT SCIATICA: ICD-10-CM

## 2024-07-12 DIAGNOSIS — G89.29 CHRONIC BILATERAL LOW BACK PAIN WITHOUT SCIATICA: ICD-10-CM

## 2024-07-12 PROCEDURE — 3074F SYST BP LT 130 MM HG: CPT | Performed by: INTERNAL MEDICINE

## 2024-07-12 PROCEDURE — 3078F DIAST BP <80 MM HG: CPT | Performed by: INTERNAL MEDICINE

## 2024-07-12 PROCEDURE — 99213 OFFICE O/P EST LOW 20 MIN: CPT | Performed by: INTERNAL MEDICINE

## 2024-07-29 ENCOUNTER — HOSPITAL ENCOUNTER (OUTPATIENT)
Dept: RADIOLOGY | Facility: MEDICAL CENTER | Age: 79
End: 2024-07-29
Attending: INTERNAL MEDICINE
Payer: MEDICARE

## 2024-07-29 DIAGNOSIS — I65.23 BILATERAL CAROTID ARTERY STENOSIS: ICD-10-CM

## 2024-07-29 PROCEDURE — 93880 EXTRACRANIAL BILAT STUDY: CPT

## 2024-08-19 DIAGNOSIS — I10 ESSENTIAL HYPERTENSION: ICD-10-CM

## 2024-08-19 RX ORDER — METOPROLOL TARTRATE 25 MG/1
25 TABLET, FILM COATED ORAL 2 TIMES DAILY
Qty: 200 TABLET | Refills: 3 | Status: SHIPPED | OUTPATIENT
Start: 2024-08-19

## 2024-08-19 RX ORDER — LOSARTAN POTASSIUM 100 MG/1
100 TABLET ORAL
Qty: 100 TABLET | Refills: 3 | Status: SHIPPED | OUTPATIENT
Start: 2024-08-19

## 2024-08-19 NOTE — TELEPHONE ENCOUNTER
Received request via: Patient    Was the patient seen in the last year in this department? Yes    Does the patient have an active prescription (recently filled or refills available) for medication(s) requested? No    Pharmacy Name: [id number]    Does the patient have residential Plus and need 100-day supply? (This applies to ALL medications) Yes, quantity updated to 100 days

## 2024-09-05 ENCOUNTER — TELEPHONE (OUTPATIENT)
Dept: CARDIOLOGY | Facility: MEDICAL CENTER | Age: 79
End: 2024-09-05
Payer: MEDICARE

## 2024-09-05 NOTE — TELEPHONE ENCOUNTER
Called pt in regards to lab and imaging work that was ordered at previous OV.No answer, LVM to call back. Pt has follow up appointment scheduled with CHERY on 09.12.2024.

## 2024-09-09 ENCOUNTER — HOSPITAL ENCOUNTER (OUTPATIENT)
Dept: LAB | Facility: MEDICAL CENTER | Age: 79
End: 2024-09-09
Attending: INTERNAL MEDICINE
Payer: MEDICARE

## 2024-09-09 DIAGNOSIS — Z85.850 HX OF PAPILLARY ADENOCARCINOMA OF THYROID: ICD-10-CM

## 2024-09-09 DIAGNOSIS — D75.89 MACROCYTOSIS WITHOUT ANEMIA: ICD-10-CM

## 2024-09-09 DIAGNOSIS — I25.10 CORONARY ARTERY DISEASE INVOLVING NATIVE CORONARY ARTERY OF NATIVE HEART WITHOUT ANGINA PECTORIS: ICD-10-CM

## 2024-09-09 DIAGNOSIS — E89.0 POSTOPERATIVE HYPOTHYROIDISM: ICD-10-CM

## 2024-09-09 DIAGNOSIS — Z12.5 PROSTATE CANCER SCREENING: ICD-10-CM

## 2024-09-09 DIAGNOSIS — I10 ESSENTIAL HYPERTENSION: ICD-10-CM

## 2024-09-09 LAB
ALBUMIN SERPL BCP-MCNC: 4.3 G/DL (ref 3.2–4.9)
ALBUMIN/GLOB SERPL: 1.6 G/DL
ALP SERPL-CCNC: 56 U/L (ref 30–99)
ALT SERPL-CCNC: 20 U/L (ref 2–50)
ANION GAP SERPL CALC-SCNC: 11 MMOL/L (ref 7–16)
AST SERPL-CCNC: 17 U/L (ref 12–45)
BASOPHILS # BLD AUTO: 0.9 % (ref 0–1.8)
BASOPHILS # BLD: 0.06 K/UL (ref 0–0.12)
BILIRUB SERPL-MCNC: 0.5 MG/DL (ref 0.1–1.5)
BUN SERPL-MCNC: 19 MG/DL (ref 8–22)
CALCIUM ALBUM COR SERPL-MCNC: 8.6 MG/DL (ref 8.5–10.5)
CALCIUM SERPL-MCNC: 8.8 MG/DL (ref 8.5–10.5)
CHLORIDE SERPL-SCNC: 105 MMOL/L (ref 96–112)
CHOLEST SERPL-MCNC: 126 MG/DL (ref 100–199)
CO2 SERPL-SCNC: 24 MMOL/L (ref 20–33)
CREAT SERPL-MCNC: 0.92 MG/DL (ref 0.5–1.4)
EOSINOPHIL # BLD AUTO: 0.05 K/UL (ref 0–0.51)
EOSINOPHIL NFR BLD: 0.8 % (ref 0–6.9)
ERYTHROCYTE [DISTWIDTH] IN BLOOD BY AUTOMATED COUNT: 43.8 FL (ref 35.9–50)
FASTING STATUS PATIENT QL REPORTED: NORMAL
FOLATE SERPL-MCNC: 26 NG/ML
GFR SERPLBLD CREATININE-BSD FMLA CKD-EPI: 86 ML/MIN/1.73 M 2
GLOBULIN SER CALC-MCNC: 2.7 G/DL (ref 1.9–3.5)
GLUCOSE SERPL-MCNC: 102 MG/DL (ref 65–99)
HCT VFR BLD AUTO: 46 % (ref 42–52)
HDLC SERPL-MCNC: 51 MG/DL
HGB BLD-MCNC: 15.6 G/DL (ref 14–18)
HGB RETIC QN AUTO: 37.6 PG/CELL (ref 29–35)
IMM GRANULOCYTES # BLD AUTO: 0.03 K/UL (ref 0–0.11)
IMM GRANULOCYTES NFR BLD AUTO: 0.5 % (ref 0–0.9)
IMM RETICS NFR: 12 % (ref 2.6–16.1)
LDH SERPL L TO P-CCNC: 188 U/L (ref 107–266)
LDLC SERPL CALC-MCNC: 60 MG/DL
LYMPHOCYTES # BLD AUTO: 1.57 K/UL (ref 1–4.8)
LYMPHOCYTES NFR BLD: 24.7 % (ref 22–41)
MCH RBC QN AUTO: 33.8 PG (ref 27–33)
MCHC RBC AUTO-ENTMCNC: 33.9 G/DL (ref 32.3–36.5)
MCV RBC AUTO: 99.8 FL (ref 81.4–97.8)
MONOCYTES # BLD AUTO: 0.33 K/UL (ref 0–0.85)
MONOCYTES NFR BLD AUTO: 5.2 % (ref 0–13.4)
NEUTROPHILS # BLD AUTO: 4.32 K/UL (ref 1.82–7.42)
NEUTROPHILS NFR BLD: 67.9 % (ref 44–72)
NRBC # BLD AUTO: 0 K/UL
NRBC BLD-RTO: 0 /100 WBC (ref 0–0.2)
PLATELET # BLD AUTO: 222 K/UL (ref 164–446)
PMV BLD AUTO: 11.5 FL (ref 9–12.9)
POTASSIUM SERPL-SCNC: 4.2 MMOL/L (ref 3.6–5.5)
PROT SERPL-MCNC: 7 G/DL (ref 6–8.2)
PSA SERPL-MCNC: 0.36 NG/ML (ref 0–4)
RBC # BLD AUTO: 4.61 M/UL (ref 4.7–6.1)
RETICS # AUTO: 0.09 M/UL (ref 0.04–0.12)
RETICS/RBC NFR: 2 % (ref 0.8–2.6)
SODIUM SERPL-SCNC: 140 MMOL/L (ref 135–145)
TRIGL SERPL-MCNC: 76 MG/DL (ref 0–149)
TSH SERPL DL<=0.005 MIU/L-ACNC: 0.38 UIU/ML (ref 0.38–5.33)
VIT B12 SERPL-MCNC: 569 PG/ML (ref 211–911)
WBC # BLD AUTO: 6.4 K/UL (ref 4.8–10.8)

## 2024-09-09 PROCEDURE — 82746 ASSAY OF FOLIC ACID SERUM: CPT

## 2024-09-09 PROCEDURE — 85046 RETICYTE/HGB CONCENTRATE: CPT

## 2024-09-09 PROCEDURE — 82607 VITAMIN B-12: CPT

## 2024-09-09 PROCEDURE — 84165 PROTEIN E-PHORESIS SERUM: CPT

## 2024-09-09 PROCEDURE — 84443 ASSAY THYROID STIM HORMONE: CPT

## 2024-09-09 PROCEDURE — 86334 IMMUNOFIX E-PHORESIS SERUM: CPT

## 2024-09-09 PROCEDURE — 83521 IG LIGHT CHAINS FREE EACH: CPT | Mod: 91

## 2024-09-09 PROCEDURE — 83921 ORGANIC ACID SINGLE QUANT: CPT

## 2024-09-09 PROCEDURE — 80053 COMPREHEN METABOLIC PANEL: CPT

## 2024-09-09 PROCEDURE — 82784 ASSAY IGA/IGD/IGG/IGM EACH: CPT

## 2024-09-09 PROCEDURE — 84155 ASSAY OF PROTEIN SERUM: CPT

## 2024-09-09 PROCEDURE — 80061 LIPID PANEL: CPT

## 2024-09-09 PROCEDURE — 84153 ASSAY OF PSA TOTAL: CPT

## 2024-09-09 PROCEDURE — 83615 LACTATE (LD) (LDH) ENZYME: CPT

## 2024-09-09 PROCEDURE — 85025 COMPLETE CBC W/AUTO DIFF WBC: CPT

## 2024-09-09 PROCEDURE — 36415 COLL VENOUS BLD VENIPUNCTURE: CPT

## 2024-09-11 LAB
ALBUMIN SERPL ELPH-MCNC: 4.22 G/DL (ref 3.75–5.01)
ALPHA1 GLOB SERPL ELPH-MCNC: 0.19 G/DL (ref 0.19–0.46)
ALPHA2 GLOB SERPL ELPH-MCNC: 0.66 G/DL (ref 0.48–1.05)
B-GLOBULIN SERPL ELPH-MCNC: 0.69 G/DL (ref 0.48–1.1)
EER MONOCLONAL PROTEIN AND FLC, SERUM Q5224: NORMAL
GAMMA GLOB SERPL ELPH-MCNC: 1.05 G/DL (ref 0.62–1.51)
IGA SERPL-MCNC: 153 MG/DL (ref 68–408)
IGG SERPL-MCNC: 1107 MG/DL (ref 768–1632)
IGM SERPL-MCNC: 42 MG/DL (ref 35–263)
INTERPRETATION SERPL IFE-IMP: NORMAL
INTERPRETATION SERPL IFE-IMP: NORMAL
KAPPA LC FREE SER-MCNC: 16.72 MG/L (ref 3.3–19.4)
KAPPA LC FREE/LAMBDA FREE SER NEPH: 1.03 {RATIO} (ref 0.26–1.65)
LAMBDA LC FREE SERPL-MCNC: 16.3 MG/L (ref 5.71–26.3)
MONOCLONAL PROTEIN NL11656: NORMAL G/DL
PROT SERPL-MCNC: 6.8 G/DL (ref 6.3–8.2)

## 2024-09-12 ENCOUNTER — OFFICE VISIT (OUTPATIENT)
Dept: CARDIOLOGY | Facility: MEDICAL CENTER | Age: 79
End: 2024-09-12
Attending: INTERNAL MEDICINE
Payer: MEDICARE

## 2024-09-12 VITALS
HEIGHT: 68 IN | SYSTOLIC BLOOD PRESSURE: 118 MMHG | BODY MASS INDEX: 31.52 KG/M2 | OXYGEN SATURATION: 96 % | HEART RATE: 69 BPM | DIASTOLIC BLOOD PRESSURE: 78 MMHG | WEIGHT: 208 LBS | RESPIRATION RATE: 16 BRPM

## 2024-09-12 DIAGNOSIS — I65.23 BILATERAL CAROTID ARTERY STENOSIS: ICD-10-CM

## 2024-09-12 DIAGNOSIS — I25.10 CORONARY ARTERY DISEASE INVOLVING NATIVE CORONARY ARTERY OF NATIVE HEART WITHOUT ANGINA PECTORIS: ICD-10-CM

## 2024-09-12 DIAGNOSIS — E78.2 MIXED HYPERLIPIDEMIA: ICD-10-CM

## 2024-09-12 DIAGNOSIS — I10 ESSENTIAL HYPERTENSION: ICD-10-CM

## 2024-09-12 DIAGNOSIS — Z95.1 S/P CABG (CORONARY ARTERY BYPASS GRAFT): ICD-10-CM

## 2024-09-12 PROCEDURE — G2211 COMPLEX E/M VISIT ADD ON: HCPCS | Performed by: INTERNAL MEDICINE

## 2024-09-12 PROCEDURE — 3074F SYST BP LT 130 MM HG: CPT | Performed by: INTERNAL MEDICINE

## 2024-09-12 PROCEDURE — 3078F DIAST BP <80 MM HG: CPT | Performed by: INTERNAL MEDICINE

## 2024-09-12 PROCEDURE — 99212 OFFICE O/P EST SF 10 MIN: CPT | Performed by: INTERNAL MEDICINE

## 2024-09-12 PROCEDURE — 99214 OFFICE O/P EST MOD 30 MIN: CPT | Performed by: INTERNAL MEDICINE

## 2024-09-12 ASSESSMENT — ENCOUNTER SYMPTOMS
DEPRESSION: 0
PSYCHIATRIC NEGATIVE: 1
ABDOMINAL PAIN: 0
COUGH: 0
CARDIOVASCULAR NEGATIVE: 1
FEVER: 0
CONSTITUTIONAL NEGATIVE: 1
NAUSEA: 0
WEAKNESS: 0
VOMITING: 0
CLAUDICATION: 0
HEADACHES: 0
NEUROLOGICAL NEGATIVE: 1
EYES NEGATIVE: 1
MYALGIAS: 0
MUSCULOSKELETAL NEGATIVE: 1
PALPITATIONS: 0
RESPIRATORY NEGATIVE: 1
SHORTNESS OF BREATH: 0
FOCAL WEAKNESS: 0
DIZZINESS: 0
BRUISES/BLEEDS EASILY: 0
BLURRED VISION: 0
GASTROINTESTINAL NEGATIVE: 1
CHILLS: 0
DOUBLE VISION: 0
NERVOUS/ANXIOUS: 0
WEIGHT LOSS: 0

## 2024-09-12 ASSESSMENT — FIBROSIS 4 INDEX: FIB4 SCORE: 1.32

## 2024-09-12 NOTE — PROGRESS NOTES
Chief Complaint   Patient presents with    Hyperlipidemia    Hypertension       Subjective     Ed Ed Becerra is a 79 y.o. male who presents today for follow up of coronary artery disease with prior coronary artery bypass graft surgery, aortic stenosis, hypertension and hyperlipidemia.     Since the patient's last visit on 05/17/24, he has been doing well clinically from cardiac standpoint. He denies fatigue, chest pain, shortness of breath, palpitations, lower extremity edema, dizziness or syncope. On the last visit his atorvastatin dose was increased and his cholesterol level is better controlled. He suffers with hip pain. He keeps active exercising.    Past Surgical History:   Procedure Laterality Date    THYROIDECTOMY TOTAL  10/23/2018    Procedure: THYROIDECTOMY TOTAL;  Surgeon: Angel Adams M.D.;  Location: SURGERY [facility];  Service: Ent    NECK DISSECTION  10/23/2018    Procedure: NECK DISSECTION - POSSIBLE LIMITED CENTRAL;  Surgeon: Angel Adams M.D.;  Location: [facility];  Service: Ent    CATARACT PHACO WITH IOL Left 7/2/2015    Procedure: CATARACT PHACO WITH IOL;  Surgeon: Matty Augustin M.D.;  Location: SURGERY [facility];  Service:     CATARACT PHACO WITH IOL Right 6/4/2015    Procedure: CATARACT PHACO WITH IOL;  Surgeon: Matty Augustin M.D.;  Location: [facility];  Service:     MULTIPLE CORONARY ARTERY BYPASS ENDO VEIN HARVEST  12/15/2014    Performed by Gustavo Richardson M.D. at SURGERY [facility]    PROSTATECTOMY, RADICAL RETRO  12/17/2008     Family History   Problem Relation Age of Onset    Cancer Sister         colon    Cancer Brother         prostate    Heart Disease Mother     Arthritis Mother     Hypertension Mother     Hyperlipidemia Mother     Stroke Mother     Heart Disease Father     Hypertension Father     Hyperlipidemia Father     Stroke Father     Arthritis Brother     Hyperlipidemia Brother     Diabetes Neg Hx      Social  History     Socioeconomic History    Marital status:      Spouse name: Not on file    Number of children: Not on file    Years of education: Not on file    Highest education level: Bachelor's degree (e.g., BA, AB, BS)   Occupational History    Not on file   Tobacco Use    Smoking status: Never    Smokeless tobacco: Never    Tobacco comments:     continued abstinence   Vaping Use    Vaping status: Never Used   Substance and Sexual Activity    Alcohol use: No     Alcohol/week: 0.0 oz    Drug use: No    Sexual activity: Not Currently     Partners: Female     Comment: s/p prostatectomy   Other Topics Concern    Not on file   Social History Narrative    Clint was born and raised in the [place]. He has been in [place] since 1989. He continues to work doing taxes, he is a CPA. He is  to Aleksandra for 50 years, they met in college. They have 3 kids- Larissa (49; [place]), Marina (46; [place]), Atif (39; [place]). They enjoy spending times with their grandkids, 4 in [place] and 2 in [place].      Social Determinants of Health     Financial Resource Strain: Low Risk  (8/23/2021)    Overall Financial Resource Strain (CARDIA)     Difficulty of Paying Living Expenses: Not hard at all   Food Insecurity: No Food Insecurity (12/12/2022)    Hunger Vital Sign     Worried About Running Out of Food in the Last Year: Never true     Ran Out of Food in the Last Year: Never true   Transportation Needs: No Transportation Needs (12/12/2022)    PRAPARE - Transportation     Lack of Transportation (Medical): No     Lack of Transportation (Non-Medical): No   Physical Activity: Insufficiently Active (12/12/2022)    Exercise Vital Sign     Days of Exercise per Week: 1 day     Minutes of Exercise per Session: 10 min   Stress: No Stress Concern Present (12/12/2022)    [employer] [employer] - Occupational Stress Questionnaire     Feeling of Stress : Only a little   Social Connections: Moderately Integrated (12/12/2022)    Social  Connection and Isolation Panel [NHANES]     Frequency of Communication with Friends and Family: Three times a week     Frequency of Social Gatherings with Friends and Family: Once a week     Attends Confucianism Services: More than 4 times per year     Active Member of Clubs or Organizations: No     Attends Club or Organization Meetings: Never     Marital Status:    Intimate Partner Violence: Not on file   Housing Stability: Low Risk  (12/12/2022)    Housing Stability Vital Sign     Unable to Pay for Housing in the Last Year: No     Number of Places Lived in the Last Year: 1     Unstable Housing in the Last Year: No     No Known Allergies    (Medications reviewed.)  Outpatient Encounter Medications as of 9/12/2024   Medication Sig Dispense Refill    metoprolol tartrate (LOPRESSOR) 25 MG Tab Take 1 Tablet by mouth 2 times a day. TAKE 1 TABLET BY MOUTH TWICE A  Tablet 3    losartan (COZAAR) 100 MG Tab Take 1 Tablet by mouth every day. 100 Tablet 3    clopidogrel (PLAVIX) 75 MG Tab Take 1 Tablet by mouth every day. **LAST SEEN 9/2022 .  TO ENSURE FURTHER REFILLS, KEEP SCHEDULED FOLLOW UP VISIT 5/17/2024.** 100 Tablet 1    atorvastatin (LIPITOR) 40 MG Tab Take 1 Tablet by mouth every evening. 90 Tablet 3    levothyroxine (SYNTHROID) 125 MCG Tab TAKE 1 TABLET BY MOUTH EVERY DAY IN THE MORNING ON AN EMPTY STOMACH 100 Tablet 3    latanoprost (XALATAN) 0.005 % Solution INSTILL 1 DROP BOTH EYES EVERY MORNING      Multiple Vitamins-Minerals (MULTIVITAMIN ADULT PO) Take 1 Tab by mouth every day.      Calcium 600 MG Tab Take 1 Tab by mouth every day.       No facility-administered encounter medications on file as of 9/12/2024.     Review of Systems   Constitutional: Negative.  Negative for chills, fever, malaise/fatigue and weight loss.   HENT: Negative.  Negative for hearing loss.    Eyes: Negative.  Negative for blurred vision and double vision.   Respiratory: Negative.  Negative for cough and shortness of breath.   "  Cardiovascular: Negative.  Negative for chest pain, palpitations, claudication and leg swelling.   Gastrointestinal: Negative.  Negative for abdominal pain, nausea and vomiting.   Genitourinary: Negative.  Negative for dysuria and urgency.   Musculoskeletal: Negative.  Negative for joint pain and myalgias.   Skin: Negative.  Negative for itching and rash.   Neurological: Negative.  Negative for dizziness, focal weakness, weakness and headaches.   Endo/Heme/Allergies: Negative.  Does not bruise/bleed easily.   Psychiatric/Behavioral: Negative.  Negative for depression. The patient is not nervous/anxious.               Objective     /78 (BP Location: Left arm, Patient Position: Sitting, BP Cuff Size: Adult)   Pulse 69   Resp 16   Ht 1.727 m (5' 8\")   Wt 94.3 kg (208 lb)   SpO2 96%   BMI 31.63 kg/m²     Physical Exam  Constitutional:       Appearance: Normal appearance. He is well-developed and normal weight.   HENT:      Head: Normocephalic and atraumatic.   Neck:      Vascular: No JVD.   Cardiovascular:      Rate and Rhythm: Normal rate and regular rhythm.      Heart sounds: Murmur heard.   Pulmonary:      Effort: Pulmonary effort is normal.      Breath sounds: Normal breath sounds.   Abdominal:      General: Bowel sounds are normal.      Palpations: Abdomen is soft.      Comments: No hepatosplenomegaly.   Musculoskeletal:         General: Normal range of motion.   Lymphadenopathy:      Cervical: No cervical adenopathy.   Skin:     General: Skin is warm and dry.   Neurological:      Mental Status: He is alert and oriented to person, place, and time.            CARDIAC STUDIES/PROCEDURES:     CARDIAC CATHETERIZATION CONCLUSIONS (12/13/14)  1. Two-vessel coronary artery disease with mid left anterior descending   artery, ostial diagonal branch and proximal circumflex artery stenosis.   2. Normal left ventricular systolic function with ejection fraction of 55%.   3. Mildly elevated left ventricular end " diastolic pressure.   4. Mild aortic stenosis.    CAROTID ULTRASOUND (07/29/24)  Limitations present. Mid to distal segment of the right internal carotid   artery not well visualized due to acoustic shadowing.  1. There is  <50% stenosis in bilateral internal carotid arteries.   2. The bilateral subclavian and vertebral artery waveforms are antegrade  (study result reviewed)      CAROTID ULTRASOUND (11/01/22)  1.  There is a moderate amount of atherosclerotic plaque.  Plaque is located in carotid bulbs and proximal internal carotid arteries.  Plaque characterization:  heterogeneous, partially calcified.  2. There is no hemodynamically significant stenosis. Diameter reduction in the internal carotid arteries: less than 50%. There is no evidence of carotid occlusion.  3.  Vertebral arteries demonstrate antegrade flow.    CAROTID ULTRASOUND (04/20/21)  1.  There is moderate amount of atherosclerotic plaque.  Plaque is located in carotid bulbs and proximal internal carotid arteries.  Plaque characterization:  Heterogeneous, partially calcified  2.  There is no evidence of carotid occlusion   3. Vertebral arteries demonstrate antegrade flow.  4. Diameter reduction in the internal carotid arteries: less than 50% where visualized. However, there is markedly decreased flow in the right ICA, with parvus-tardus waveform, suggestive of high-grade upstream ICA stenosis, most likely in the region of   right common carotid bifurcation. Consider further evaluation with neck CTA.     CAROTID ULTRASOUND (05/17/18)  Probable distal right internal carotid occlusion, chronic. No changes from 2017.  Mild stenosis of the left internal carotid (< 50%).   Flow within both subclavian arteries appears to be within normal limits.   Antegrade flow, bilateral vertebral arteries.   Incidental left-sided thyroid cyst noted.  Follow as indicated clinically.     CAROTID ULTRASOUND (12/13/14)  RIGHT:  Very mild smooth plaque of the common carotids &  bifurcation with minimal   plaque in the proximal internal carotid artery. The ICA waveforms are   resistive with low peak systolic & end diastolic velocities which may   suggest distal obstruction or occlusion.  LEFT:  Very mild smooth plaque of the common carotids & bifurcation extending into   the internal carotid. Velocities are consistent with < 50% stenosis of the   internal carotid artery.   Bilateral subclavian and vertebral artery waveforms are antegrade and   waveforms are normal in character and velocity.      CT OF HEAD (12/18/14)  1. Occlusion or near occlusion of the intracranial right internal carotid artery with apparent tiny branch extending to the middle cerebral artery. The right middle cerebral artery appears to be supplied primarily by the anterior communicating artery.     CTA OF NECK (04/29/21)  1.  CTA neck stable since 12/14/2014  2.  Long segment approximately 80% stenosis of the right internal carotid artery extending from approximately 2 cm distal to the carotid bifurcation to the skull base. The supraclinoid portion has either occlusion with reconstitution or a very high-grade stenosis with reconstitution  3.  Findings are consistent with either a chronic dissection or atherosclerotic narrowing  4.  No other significant finding     CT OF NECK (12/18/14)  1. Diffuse narrowing of the right internal carotid artery beginning approximately 2 cm distal to its origin could be due to diffuse atherosclerotic disease or chronic dissection.  2. Occlusion or near occlusion of the intracranial portion of the right internal carotid artery.    ECHOCARDIOGRAM CONCLUSIONS (06/18/24)  Prior study on 7/6/22, compared to the report of the prior study, there   has been no significant change.   Normal left ventricular systolic function.  The left ventricular ejection fraction is visually estimated to be 55%.  Mild mitral regurgitation.  The aortic valve leaflets are heavily calcified and severely  restricted.  Mild aortic valve stenosis.  Vmax is 2.17 m/s. Transvalvular gradients are - Peak: 19 mmHg, Mean: 11 mmHg.  Mild aortic insufficiency.  Mild tricuspid regurgitation.  Estimated right ventricular systolic pressure is 40 mmHg.  (study result reviewed)   3. No evidence of left internal carotid artery stenosis.     ECHOCARDIOGRAM CONCLUSIONS (07/06/22)  Prior study 04/23/21, compared to the report of the prior study, there   has been no significant change.   Normal left ventricular systolic function.  The left ventricular ejection fraction is visually estimated to be 60%.  Mild aortic valve stenosis.  Vmax is 2.64 m/s. Transvalvular gradients are - Peak: 27  mmHg, Mean: 15 mmHg  Mild tricuspid regurgitation.  Right ventricular systolic pressure is estimated to be 35 mmHg.    ECHOCARDIOGRAM CONCLUSIONS (04/23/21)  Prior echo 05/17/18. Compared to the report of the study done - there   has been no significant change.   Normal left ventricular systolic function.  Left ventricular ejection fraction is visually estimated to be 65%.  Mild mitral regurgitation.  Mild aortic stenosis.  Vmax is 2.3 m/s. Transvalvular gradients are - Peak: 22 mmHg, Mean: 14 mmHg.   Mild tricuspid regurgitation.  Estimated right ventricular systolic pressure is 35 mmHg.     ECHOCARDIOGRAM CONCLUSIONS (05/17/18)  Prior echo 5-18-17. Compared to the report of the study done - there has been no significant change.   Normal left ventricular systolic function.  Left ventricular ejection fraction is visually estimated to be 60%.  Grade II diastolic dysfunction.  Mild mitral regurgitation.  Aortic sclerosis with mild stenosis.  Vmax is 2.01 m/s. Transvalvular gradients are - Peak: 16 mmHg,  Mean: 9 mmHg.  Mild tricuspid regurgitation.  Estimated right ventricular systolic pressure is 45 mmHg.     ECHOCARDIOGRAM CONCLUSIONS (05/17/18)  Prior echo 5-18-17. Compared to the report of the study done - there has been no significant change.    Normal left ventricular systolic function.  Left ventricular ejection fraction is visually estimated to be 60%.  Grade II diastolic dysfunction.  Mild mitral regurgitation.  Aortic sclerosis with mild stenosis.  Vmax is 2.01 m/s. Transvalvular gradients are - Peak: 16 mmHg, Mean: 9 mmHg.  Mild tricuspid regurgitation.  Estimated right ventricular systolic pressure is 45 mmHg.     ECHOCARDIOGRAM CONCLUSIONS (05/18/17)  Compared to the prior echo dated 7/9/15, the aortic valve still appears mildly stenotic.  1. Left ventricular ejection fraction is visually estimated to be 60%.   Grade II diastolic dysfunction.  2. There is mild aortic stenosis.  AV Peak Velocity 1.9 m/s                 AV Peak Gradient 14.6 mmHg               AV Mean Gradient 8 mmHg   3. Estimated right ventricular systolic pressure  is 30 mmHg.     ECHOCARDIOGRAM CONCLUSIONS (07/09/15)  Normal left ventricular systolic function.  Moderate concentric left ventricular hypertrophy.  Normal diastolic function - normal mitral inflow pattern and E/E' is normal.  Mitral annular calcification.  Mild mitral regurgitation.  Mild aortic stenosis.  Mild tricuspid regurgitation.     ECHOCARDIOGRAM CONCLUSIONS (12/12/14)  Technically good study.  Normal left ventricular systolic function.  Left ventricular ejection fraction is 60% to 65%.  Grade I diastolic dysfunction - mitral inflow E/A is <1.0.  Mild mitral regurgitation.  Mild aortic stenosis.  Mild tricuspid regurgitation.     EKG performed on (12/18/14) EKG shows atrial fibrillation. (Post CABG)  EKG performed on (12/13/14) EKG shows normal sinus rhythm.     Laboratory results of (09/09/24) were reviewed. Cholesterol profile of 126/76/51/60 mg/dL noted.  Laboratory results of (05/26/22) Cholesterol profile of 1555/97/50/86 mg/dL noted.  Laboratory results of (07/22/21) Cholesterol profile of 147/119/57/66 mg/dL noted.  Laboratory results of (02/18/21) Cholesterol profile of 158/96/46/93 mg/dL  noted.  Laboratory results of (12/20/19) Cholesterol profile of 141/118/44/73 mg/dl noted.  Laboratory results of (07/20/19) Cholesterol profile of 145/78/53/76 noted.  Laboratory results of (09/11/18) Cholesterol profile of 152/82/48/88 noted.  Laboratory results of (03/13/18) Cholesterol profile of 141/78/51/74 noted.  Laboratory results of (03/08/17) Cholesterol profile of 158/97/58/81 noted.  Laboratory results of (06/03/16) Cholesterol profile of 106/102/45/41 noted.  Laboratory results of (12/13/14) Cholesterol profile of 139/98/46/73 noted.     MYOCARDIAL PERFUSION STUDY CONCLUSIONS (09/19/22)  NUCLEAR IMAGING INTERPRETATION  Normal Lexiscan myocardial perfusion study.  No evidence of ischemia or infarct.  SDS 0.  LVEF 75%.  No ischemic changes with Regadenoson.  Occasional PVC's.  No chest pain.  ECG INTERPRETATION  Negative stress ECG for ischemia.    MYOCARDIAL PERFUSION STUDY CONCLUSIONS (09/17/18)  No evidence of significant jeopardized viable myocardium or prior myocardial infarction.  Normal left ventricular size, ejection fraction, and wall motion.  Nondiagnostic stress test.   ECG INTERPRETATION  Negative stress ECG for ischemia.       Assessment & Plan     1. Coronary artery disease involving native coronary artery of native heart without angina pectoris        2. S/P CABG (coronary artery bypass graft)        3. Essential hypertension        4. Mixed hyperlipidemia        5. Bilateral carotid artery stenosis            Medical Decision Making: Today's Assessment/Status/Plan:        Coronary artery disease with prior coronary bypass graft (x 3 with left internal mammary artery graft to left anterior descending artery, saphenous vein graft to diagonal branch, saphenous vein graft to distal obtuse marginal branch by Dr. Richardson 12/15/14): He is a 79 year old male with coronary artery disease with prior coronary artery bypass graft surgery, aortic stenosis, hypertension and hyperlipidemia. He is  clinically doing well from coronary standpoint. I will continue with current medical care including clopidogrel, metoprolol, losartan, atorvastatin.  Aortic stenosis: He remains asymptomatic with mild aortic stenosis. We will follow him clinically and repeat an echocardiogram in one year.  Hypertension: Blood pressure is well controlled. We will continue with beta blockade therapy and angiotensin receptor blockade.  Hyperlipidemia: He is doing well on statin therapy without myalgia symptoms.  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 No intervention is warranted per Andrea Askew on 12/14/14; Per Vito Barone 08/08/117: Based on the patient's asymptomatic status and the fact that the carotid artery occlusion is in the distal internal carotid artery which is surgically inaccessible I have recommended continued conservative management. The fact that he has no symptoms of TIA or stroke would led itself to a more conservative approach. I don't think that the would benefit from any type of neuro-interventional consultation based on the fact that the patient is asymptomatic): Stable on medical therapy.    We will follow up the patient in one year.    CC ligia Garvey

## 2024-09-13 LAB — METHYLMALONATE SERPL-SCNC: <0.1 UMOL/L (ref 0–0.4)

## 2024-10-15 ENCOUNTER — APPOINTMENT (OUTPATIENT)
Dept: MEDICAL GROUP | Facility: IMAGING CENTER | Age: 79
End: 2024-10-15
Payer: MEDICARE

## 2024-11-21 DIAGNOSIS — I10 ESSENTIAL HYPERTENSION: ICD-10-CM

## 2024-11-21 RX ORDER — METOPROLOL TARTRATE 25 MG/1
25 TABLET, FILM COATED ORAL 2 TIMES DAILY
Qty: 200 TABLET | Refills: 3 | Status: SHIPPED | OUTPATIENT
Start: 2024-11-21

## 2024-11-21 NOTE — TELEPHONE ENCOUNTER
Received request via: Patient    Was the patient seen in the last year in this department? Yes    Does the patient have an active prescription (recently filled or refills available) for medication(s) requested? No    Pharmacy Name: [id number]    Does the patient have [facility] Plus and need 100-day supply? (This applies to ALL medications) Yes, quantity updated to 100 days

## 2024-11-22 DIAGNOSIS — I65.23 BILATERAL CAROTID ARTERY STENOSIS: ICD-10-CM

## 2024-11-25 RX ORDER — CLOPIDOGREL BISULFATE 75 MG/1
75 TABLET ORAL
Qty: 100 TABLET | Refills: 3 | Status: SHIPPED | OUTPATIENT
Start: 2024-11-25

## 2024-11-25 NOTE — TELEPHONE ENCOUNTER
Is the patient due for a refill? Yes    Was the patient seen the past year? Yes    Date of last office visit: 09.12.2024    Does the patient have an upcoming appointment?  No       Provider to refill:JI    Does the patient have [facility] and need 100-day supply? (This applies to ALL medications) Yes, quantity updated to 100 days

## 2024-12-10 ENCOUNTER — APPOINTMENT (OUTPATIENT)
Dept: MEDICAL GROUP | Facility: IMAGING CENTER | Age: 79
End: 2024-12-10
Payer: MEDICARE

## 2024-12-10 DIAGNOSIS — I10 ESSENTIAL HYPERTENSION: ICD-10-CM

## 2024-12-10 RX ORDER — METOPROLOL TARTRATE 25 MG/1
25 TABLET, FILM COATED ORAL 2 TIMES DAILY
Qty: 200 TABLET | Refills: 3 | Status: SHIPPED | OUTPATIENT
Start: 2024-12-10

## 2024-12-10 NOTE — TELEPHONE ENCOUNTER
Received request via: Patient    Was the patient seen in the last year in this department? Yes    Does the patient have an active prescription (recently filled or refills available) for medication(s) requested? No    Pharmacy Name: [facility] [id number]    Does the patient have [facility] and need 100-day supply? (This applies to ALL medications) Yes, quantity updated to 100 days

## 2024-12-11 DIAGNOSIS — E89.0 POSTOPERATIVE HYPOTHYROIDISM: ICD-10-CM

## 2024-12-11 RX ORDER — LEVOTHYROXINE SODIUM 125 UG/1
125 TABLET ORAL
Qty: 100 TABLET | Refills: 3 | Status: SHIPPED | OUTPATIENT
Start: 2024-12-11

## 2025-02-15 DIAGNOSIS — I10 ESSENTIAL HYPERTENSION: ICD-10-CM

## 2025-02-15 DIAGNOSIS — I65.23 BILATERAL CAROTID ARTERY STENOSIS: ICD-10-CM

## 2025-02-18 ENCOUNTER — PATIENT MESSAGE (OUTPATIENT)
Dept: CARDIOLOGY | Facility: MEDICAL CENTER | Age: 80
End: 2025-02-18
Payer: MEDICARE

## 2025-02-18 RX ORDER — CLOPIDOGREL BISULFATE 75 MG/1
75 TABLET ORAL
Qty: 100 TABLET | Refills: 2 | Status: SHIPPED | OUTPATIENT
Start: 2025-02-18

## 2025-02-18 RX ORDER — METOPROLOL TARTRATE 25 MG/1
25 TABLET, FILM COATED ORAL 2 TIMES DAILY
Qty: 200 TABLET | Refills: 0 | Status: SHIPPED | OUTPATIENT
Start: 2025-02-18

## 2025-02-18 NOTE — TELEPHONE ENCOUNTER
Received request via: Pharmacy    Was the patient seen in the last year in this department? Yes    Does the patient have an active prescription (recently filled or refills available) for medication(s) requested? No    Pharmacy Name: [id number]    Does the patient have [facility] Plus and need 100-day supply? (This applies to ALL medications) Yes, quantity updated to 100 days

## 2025-02-21 DIAGNOSIS — E78.2 MIXED HYPERLIPIDEMIA: ICD-10-CM

## 2025-02-21 RX ORDER — ATORVASTATIN CALCIUM 40 MG/1
40 TABLET, FILM COATED ORAL NIGHTLY
Qty: 100 TABLET | Refills: 3 | Status: SHIPPED | OUTPATIENT
Start: 2025-02-21

## 2025-02-21 NOTE — PROGRESS NOTES
[facility]    Pt has seen PCP in past 12 months and in need of prescription refills per protocol.  A 100 day supply is provided whenever possible to improve adherence rates.     Lab Results   Component Value Date/Time    HBA1C 5.4 01/23/2020 09:19 AM      Lab Results   Component Value Date/Time    MALBCRT see below 06/23/2016 07:33 AM    MICROALBUR <0.7 06/23/2016 07:33 AM      Lab Results   Component Value Date/Time    ALKPHOSPHAT 56 09/09/2024 08:40 AM    ASTSGOT 17 09/09/2024 08:40 AM    ALTSGPT 20 09/09/2024 08:40 AM    TBILIRUBIN 0.5 09/09/2024 08:40 AM        Meds refilled:  Atorvastatin    Ish Hamilton, RhysD

## 2025-05-30 ENCOUNTER — HOSPITAL ENCOUNTER (OUTPATIENT)
Dept: LAB | Facility: MEDICAL CENTER | Age: 80
End: 2025-05-30
Attending: INTERNAL MEDICINE
Payer: MEDICARE

## 2025-05-30 LAB
ALBUMIN SERPL BCP-MCNC: 4.2 G/DL (ref 3.2–4.9)
ALBUMIN/GLOB SERPL: 1.5 G/DL
ALP SERPL-CCNC: 73 U/L (ref 30–99)
ALT SERPL-CCNC: 27 U/L (ref 2–50)
ANION GAP SERPL CALC-SCNC: 12 MMOL/L (ref 7–16)
AST SERPL-CCNC: 24 U/L (ref 12–45)
BILIRUB SERPL-MCNC: 0.8 MG/DL (ref 0.1–1.5)
BUN SERPL-MCNC: 15 MG/DL (ref 8–22)
CALCIUM ALBUM COR SERPL-MCNC: 7.9 MG/DL (ref 8.5–10.5)
CALCIUM SERPL-MCNC: 8.1 MG/DL (ref 8.5–10.5)
CHLORIDE SERPL-SCNC: 106 MMOL/L (ref 96–112)
CHOLEST SERPL-MCNC: 119 MG/DL (ref 100–199)
CO2 SERPL-SCNC: 26 MMOL/L (ref 20–33)
CREAT SERPL-MCNC: 0.94 MG/DL (ref 0.5–1.4)
FASTING STATUS PATIENT QL REPORTED: NORMAL
GFR SERPLBLD CREATININE-BSD FMLA CKD-EPI: 83 ML/MIN/1.73 M 2
GLOBULIN SER CALC-MCNC: 2.8 G/DL (ref 1.9–3.5)
GLUCOSE SERPL-MCNC: 82 MG/DL (ref 65–99)
HDLC SERPL-MCNC: 56 MG/DL
LDLC SERPL CALC-MCNC: 53 MG/DL
POTASSIUM SERPL-SCNC: 3.6 MMOL/L (ref 3.6–5.5)
PROT SERPL-MCNC: 7 G/DL (ref 6–8.2)
SODIUM SERPL-SCNC: 144 MMOL/L (ref 135–145)
TRIGL SERPL-MCNC: 51 MG/DL (ref 0–149)

## 2025-05-30 PROCEDURE — 36415 COLL VENOUS BLD VENIPUNCTURE: CPT

## 2025-05-30 PROCEDURE — 80053 COMPREHEN METABOLIC PANEL: CPT

## 2025-05-30 PROCEDURE — 80061 LIPID PANEL: CPT

## 2025-06-02 NOTE — Clinical Note
[facility]  [address] [facility] [place] [address]    [id number]    Clint Becerra  [address] [place] [address]    June 2, 2025    Member Name: Joshua Becerra   Member Number: [id number]   Reference Number: [id number]   Approved Services: Echos and EKG   Approved Service Dates: 06/02/2025 - 09/30/2025   Requesting Provider: Nahun Garcia   Requested Provider: [facility]     Dear Joshua Becerra:    The following medical service(s) requested by Nahun Garcia have been approved:    Procedure Code Procedure Code Name Requested Quantity Approved Quantity Status   17698 (CPT®) SC ECHO HEART XTHORACIC,COMPLETE W DOPPLER 1 1 Authorized       Approved Quantity means the number of visits approved for medication treatments and/or medical services.    The services should be provided by [facility] no later than 09/30/2025. Please contact the provider listed below with any questions.     Provider Information:  [facility]  [phone]    Your plan benefit may require a deductible, co-payment or coinsurance for these services. This authorization does not guarantee [facility] will pay the claim for services that you receive. Payment by [facility] for these services is subject to the terms of your Evidence of Coverage, your eligibility at the time of service, and confirmation of benefit coverage.    For any questions or additional information, please contact Customer Service:    [employer] Toll Free: [phone]  TTY users dial: [phone]   Call Center Hours:  Oct 1 - Mar 31, Mon - Fri 7 AM to 8 PM PST  Oct 1 - Mar 31, Sat - Sun 8 AM to 8 PM PST  Apr 1 - Sep 30, Mon - Fri 7 AM to 8 PM PST   Office Hours: Mon - Fri 8 AM to 5 PM PST   E-mail: [email or web address]   Website:  [email or web address]      This information is available for free in other languages. Please contact Customer  Service at the phone number above for more information. [facility] complies with applicable Federal civil rights laws and does not discriminate on the basis of race, color, national origin, age, disability or sex.    Sincerely,     Healthcare Utilization Management Department     Cc: [facility]   Nahun Garcia    Multi-Language Insert  Multi- Services  English: We have free  services to answer any questions you may have about our health or drug plan.  To get an , just call us at [phone].  Someone who speaks English/Language can help you.  This is a free service.  Slovak: Tenemos servicios de intérprete sin costo alguno  para responder cualquier pregunta que pueda tener sobre nuestro plan de nithin o medicamentos. Para hablar con un intérprete, por favor llame al [phone]. Alguien que hable español le podrá ayudar. Carlota es un servicio gratuito.  Chinese Mandarin: ?????????????????????????????? ???????????????? [phone]????????????????? ?????????  Chinese Cantonese: ?????????????????????????????? ????????????? [phone]???????????????????? ????????  Tagalog:  Lion easton serbisyo sa enamoradosabradley haa apryl portillonggileigh murillo o panggamot.  bharat Borrero  [phone]. See loweta ng Tagalog.  Henrique nicolas.  Cape Verdean:  Nous proposons jocelyn services gratuits d'interprétation pour répondre à toutes missy questions relatives à notre régime de santé ou d'assurance-médicaments. Pour accéder au service d'interprétation, il vous suffit de nous appeler au [phone]. Un interlocuteur parAscension Saint Clare's Hospitalkwaku [facility] pourra vous aider. Ce service est gratuit.  Central African:  Papi de santiagoi có d?ch v? thông d?ch mi?n phí ð? tr? l?i các câu h?i v? chýõng s?c kh?e và chýõng trình thu?c men. N?u quí v? c?n  thông d?ch viên kevin g?i [phone] s? có nhân viên nói ti?ng Vi?t giúp ð? quí v?. Ðây là d?ch v? mi?n phí .  Vincentian:  Unser kostenloser Dolmetscherservice beantwortet Ihren Fragen zu unserem Gesundheits- und Arzneimittelplan. Unsere Dolmetscher erreichen Sie [phone]. Man wird Ihnen otto auf [facility]. Dieser Service ist [facility] Medical Center.  Kinyarwanda:  ??? ?? ?? ?? ?? ??? ?? ??? ?? ???? ?? ?? ???? ???? ????. ?? ???? ????? ?? [phone] ??? ??? ????.  ???? ?? ???? ?? ?? ????. ? ???? ??? ?????.   [place]ican: Åñëè ó âàñ âîçíèêíóò âîïðîñû îòíîñèòåëüíî ñòðàõîâîãî èëè ìåäèêàìåíòíîãî ïëàíà, âû ìîæåòå âîñïîëüçîâàòüñÿ íàøèìè áåñïëàòíûìè óñëóãàìè ïåðåâîä÷èêîâ. ×òîáû âîñïîëüçîâàòüñÿ óñëóãàìè ïåðåâîä÷èêà, ïîçâîíèòå íàì ïî òåëåôîíó [phone]. Âàì îêàæåò ïîìîùü ñîòðóäíèê, êîòîðûé ãîâîðèò ïî-póññêè. Äàííàÿ óñëóãà áåñïëàòíàÿ.  Spanish: ÅääÇ äÞÏã ÎÏãÇÊ ÇáãÊÑÌã ÇáÝæÑí ÇáãÌÇäíÉ ááÅÌÇÈÉ Úä Ãí ÃÓÆáÉ ÊÊÚáÞ ÈÇáÕÍÉ Ãæ ÌÏæá ÇáÃÏæíÉ áÏíäÇ. ááÍÕæá Úáì ãÊÑÌã ÝæÑí¡ áíÓ Úáíß Óæì ÇáÇÊÕÇá ÈäÇ Úáì [phone] . ÓíÞæã ÔÎÕ ãÇ íÊÍÏË ÇáÚÑÈíÉ ÈãÓÇÚÏÊß. åÐå ÎÏãÉ ãÌÇäíÉ.  Chavez: ????? ????????? ?? ??? ?? ????? ?? ???? ??? ???? ???? ?? ?????? ?? ???? ???? ?? ??? ????? ??? ????? ???????? ?????? ?????? ???. ?? ???????? ??????? ???? ?? ???, ?? ???? [phone] ?? ??? ????. ??? ??????? ?? ?????? ????? ?? ???? ??? ?? ???? ??. ?? ?? ????? ???? ??.   Vietnamese:  È disponibile un servizio di interpretariato gratuito per rispondere a eventuali domande sul nostro piano sanitario e farmaceutico. Per un interprete, contattare il sumeet [phone]. Un nostro incaricato dot parla Italianovi fornirà l'assistenza necessaria. È un servizio gratuito.  Portugués:  Dispomos de serviços de interpretação gratuitos para responder a qualquer questão que tenha acerca do nosso plano de saúde ou de medicação. Para obter um intérprete, contacte-nos através do número [phone]. Irá encontrar alguém que fale o idioma  Português para o ajudar. Carlota serviço é  gratuito.  Albanian Creole:  Nou genyen sèvis entèprèt gratis kellie reponn tout kesyon ou ta genyen konsènan plan medikal oswa dwòg nou an.  Kellie jwenn yon entèprèt, jis rele nou nan [phone]. Yon moun ki pale [place] kapab melyssa w.  Sa a se yon sèvis ki gratis.  Polish:  Umo¿liwiamy bezp³atne skorzystanie z us³ug t³umacza ustnego, który pomo¿e w uzyskaniu odpowiedzi na temat planu zdrowotnego lub dawkowania jonahw. Ashley skorzystaæ z pomocy t³umacza znaj¹cego tuyet guajardo¿y zadzwoniæ pod numer [phone]. Ta us³uga jest bezp³atna.  Guamanian: ????? ??????? ????????????????????? ??????????????????????????????????[phone] ???????????????? ? ????????????????? ?????

## 2025-06-13 ENCOUNTER — HOSPITAL ENCOUNTER (OUTPATIENT)
Dept: CARDIOLOGY | Facility: MEDICAL CENTER | Age: 80
End: 2025-06-13
Attending: INTERNAL MEDICINE
Payer: MEDICARE

## 2025-06-13 DIAGNOSIS — I35.0 NONRHEUMATIC AORTIC VALVE STENOSIS: ICD-10-CM

## 2025-06-13 PROCEDURE — 93306 TTE W/DOPPLER COMPLETE: CPT

## 2025-06-14 ENCOUNTER — RESULTS FOLLOW-UP (OUTPATIENT)
Dept: CARDIOLOGY | Facility: MEDICAL CENTER | Age: 80
End: 2025-06-14

## 2025-06-14 LAB
LV EJECT FRACT  99904: 60
LV EJECT FRACT MOD 2C 99903: 55.64
LV EJECT FRACT MOD 4C 99902: 55.22
LV EJECT FRACT MOD BP 99901: 56.05

## 2025-06-14 PROCEDURE — 93306 TTE W/DOPPLER COMPLETE: CPT | Mod: 26 | Performed by: INTERNAL MEDICINE

## 2025-06-17 ENCOUNTER — TELEPHONE (OUTPATIENT)
Dept: CARDIOLOGY | Facility: MEDICAL CENTER | Age: 80
End: 2025-06-17
Payer: MEDICARE

## 2025-06-17 NOTE — TELEPHONE ENCOUNTER
LVM for pt re: scheduling appt for his wife and him to both see JI same day. Asked pt to call back to set up appts. /cg 06/17/25

## 2025-06-17 NOTE — TELEPHONE ENCOUNTER
Schedulers: Please contact pt to schedule back to back appt with his wife with CHERY. Pt is scheduled 9/8 but there is not another appt available that day.    Thank you!

## 2025-08-13 DIAGNOSIS — Z11.59 ENCOUNTER FOR SCREENING FOR OTHER VIRAL DISEASES: Primary | ICD-10-CM

## 2025-08-13 DIAGNOSIS — Z11.59 NEED FOR HEPATITIS B SCREENING TEST: ICD-10-CM

## 2025-11-06 ENCOUNTER — APPOINTMENT (OUTPATIENT)
Dept: MEDICAL GROUP | Facility: IMAGING CENTER | Age: 80
End: 2025-11-06
Payer: MEDICARE

## (undated) DEVICE — RESERVOIR SUCTION 100 CC - SILICONE (20EA/CA)

## (undated) DEVICE — DRAPE LARGE 3 QUARTER - (20/CA)

## (undated) DEVICE — SUTURE 3-0 SILK 12 X 18 IN - (36/BX)

## (undated) DEVICE — SUTURE 2-0 SILK 12 X 18" (36PK/BX)"

## (undated) DEVICE — GLOVE SZ 7.5 BIOGEL PI MICRO - PF LF (50PR/BX)

## (undated) DEVICE — SUTURE 4-0 PROLENE PS-2 18 (36PK/BX)"

## (undated) DEVICE — CORDS BIPOLAR COAGULATION - 12FT STERILE DISP. (10EA/BX)

## (undated) DEVICE — GLOVE BIOGEL PI INDICATOR SZ 6.5 SURGICAL PF LF - (50/BX 4BX/CA)

## (undated) DEVICE — SET LEADWIRE 5 LEAD BEDSIDE DISPOSABLE ECG (1SET OF 5/EA)

## (undated) DEVICE — SUTURE 3-0 VICRYL PLUS SH - 8X 18 INCH (12/BX)

## (undated) DEVICE — PROTECTOR ULNA NERVE - (36PR/CA)

## (undated) DEVICE — SUCTION INSTRUMENT YANKAUER BULBOUS TIP W/O VENT (50EA/CA)

## (undated) DEVICE — DRAPE MAGNETIC (INSTRA-MAG) - (30/CA)

## (undated) DEVICE — CHLORAPREP 26 ML APPLICATOR - ORANGE TINT(25/CA)

## (undated) DEVICE — BLADE SURGICAL #15 - (50/BX 3BX/CA)

## (undated) DEVICE — PACK MINOR BASIN - (2EA/CA)

## (undated) DEVICE — GLOVE BIOGEL PI INDICATOR SZ 7.0 SURGICAL PF LF - (50/BX 4BX/CA)

## (undated) DEVICE — SUTURE 2-0 SILK SH C/R ETHICON (12PK/BX)

## (undated) DEVICE — SPONGE XRAY 8X4 STERL. 12PL - (10EA/TY 80TY/CA)

## (undated) DEVICE — SLEEVE, VASO, THIGH, MED

## (undated) DEVICE — TOWELS CLOTH SURGICAL - (4/PK 20PK/CA)

## (undated) DEVICE — STERI STRIP COMPOUND BENZOIN - TINCTURE 0.6ML WITH APPLICATOR (40EA/BX)

## (undated) DEVICE — MASK ANESTHESIA ADULT  - (100/CA)

## (undated) DEVICE — DRAPE LAPAROTOMY T SHEET - (12EA/CA)

## (undated) DEVICE — KIT EVACUATER 3 SPRING PVC LF 1/8 DRAIN SIZE (10EA/CA)"

## (undated) DEVICE — GOWN SURGEONS X-LARGE - DISP. (30/CA)

## (undated) DEVICE — CLIP MED INTNL HRZN TI ESCP - (25/BX)

## (undated) DEVICE — BOVIE BLADE COATED - (50/PK)

## (undated) DEVICE — VESSELOOP MINI BLUE STERILE - SURG-I-LOOP (10EA/BX)

## (undated) DEVICE — GLOVE BIOGEL ECLIPSE PF LATEX SIZE 7.5

## (undated) DEVICE — DRAPE SURGICAL U 77X120 - (10/CA)

## (undated) DEVICE — DRESSING TRANSPARENT FILM TEGADERM 4 X 4.75" (50EA/BX)"

## (undated) DEVICE — SPONGE GAUZESTER 4 X 4 4PLY - (128PK/CA)

## (undated) DEVICE — NEEDLE NON SAFETY 25 GA X 1 1/2 IN HYPO (100EA/BX)

## (undated) DEVICE — TUBE EMG NIM TRIVANTAGE 8MM (3EA/PK)

## (undated) DEVICE — NEPTUNE 4 PORT MANIFOLD - (20/PK)

## (undated) DEVICE — SUTURE 3-0 SILK SH C/R 18 IN - (12/BX)

## (undated) DEVICE — SHEAR HS FOCUS 9CM CVD - (6/BX)

## (undated) DEVICE — GLOVE BIOGEL SZ 6.5 SURGICAL PF LTX (50PR/BX 4BX/CA)

## (undated) DEVICE — TRAY SKIN SCRUB PVP WET (20EA/CA) PART #DYND70356 DISCONTINUED

## (undated) DEVICE — ELECTRODE 850 FOAM ADHESIVE - HYDROGEL RADIOTRNSPRNT (50/PK)

## (undated) DEVICE — KIT SURGIFLO W/OUT THROMBIN - (6EA/CA)

## (undated) DEVICE — DRAIN J-VAC 7MM FLAT - (10EA/CA)

## (undated) DEVICE — HEAD HOLDER JUNIOR/ADULT

## (undated) DEVICE — SUTURE GENERAL

## (undated) DEVICE — SUTURE 5-0 PROLENE P-3 - (12/BX)

## (undated) DEVICE — GOWN WARMING STANDARD FLEX - (30/CA)

## (undated) DEVICE — STAPLER SKIN DISP - (6/BX 10BX/CA) VISISTAT

## (undated) DEVICE — LACTATED RINGERS INJ 1000 ML - (14EA/CA 60CA/PF)

## (undated) DEVICE — GLOVE BIOGEL INDICATOR SZ 7SURGICAL PF LTX - (50/BX 4BX/CA)

## (undated) DEVICE — SPONGE PEANUT - (5/PK 50PK/CA)

## (undated) DEVICE — SHEET THYROID - (10EA/CA)

## (undated) DEVICE — BOVIE NEEDLE TIP INSULATD NON-SAFETY 2CM (50/PK)

## (undated) DEVICE — CLIP SM INTNL HRZN TI ESCP LGT - (24EA/PK 25PK/BX)

## (undated) DEVICE — SET EXTENSION WITH 2 PORTS (48EA/CA) ***PART #2C8610 IS A SUBSTITUTE*****

## (undated) DEVICE — GLOVE BIOGEL ECLIPSE PF LATEX SIZE 9.0

## (undated) DEVICE — TUBING CLEARLINK DUO-VENT - C-FLO (48EA/CA)

## (undated) DEVICE — DRAPE STRLE REG TOWEL 18X24 - (10/BX 4BX/CA)"

## (undated) DEVICE — GLOVE BIOGEL SZ 6 PF LATEX - (50EA/BX 4BX/CA)

## (undated) DEVICE — SODIUM CHL IRRIGATION 0.9% 1000ML (12EA/CA)

## (undated) DEVICE — CLOSURE SKIN STRIP 1/2 X 4 IN - (STERI STRIP) (50/BX 4BX/CA)

## (undated) DEVICE — PROBE PRASS STAND STIMULATING (5EA/PK)

## (undated) DEVICE — DETERGENT RENUZYME PLUS 10 OZ PACKET (50/BX)

## (undated) DEVICE — GLOVE BIOGEL SZ 7.5 SURGICAL PF LTX - (50PR/BX 4BX/CA)

## (undated) DEVICE — SYRINGE 10 ML CONTROL LL (25EA/BX 4BX/CA)

## (undated) DEVICE — CANISTER SUCTION 3000ML MECHANICAL FILTER AUTO SHUTOFF MEDI-VAC NONSTERILE LF DISP  (40EA/CA)

## (undated) DEVICE — KIT ANESTHESIA W/CIRCUIT & 3/LT BAG W/FILTER (20EA/CA)

## (undated) DEVICE — FIBRILLAR SURGICEL 4X4 - 10/CA

## (undated) DEVICE — VESSELOOP MAXI BLUE STERILE- SURG-I-LOOP (10EA/BX)

## (undated) DEVICE — GLOVE BIOGEL ECLIPSE  PF LATEX SIZE 6.5 (50PR/BX)

## (undated) DEVICE — SENSOR SPO2 NEO LNCS ADHESIVE (20/BX) SEE USER NOTES

## (undated) DEVICE — ELECTRODE DUAL RETURN W/ CORD - (50/PK)

## (undated) DEVICE — KIT ROOM DECONTAMINATION